# Patient Record
Sex: FEMALE | Race: WHITE
[De-identification: names, ages, dates, MRNs, and addresses within clinical notes are randomized per-mention and may not be internally consistent; named-entity substitution may affect disease eponyms.]

---

## 2017-06-26 ENCOUNTER — HOSPITAL ENCOUNTER (EMERGENCY)
Dept: HOSPITAL 10 - FTE | Age: 72
Discharge: HOME | End: 2017-06-26
Payer: MEDICARE

## 2017-06-26 VITALS
WEIGHT: 206.13 LBS | HEIGHT: 66 IN | HEIGHT: 66 IN | BODY MASS INDEX: 33.13 KG/M2 | WEIGHT: 206.13 LBS | BODY MASS INDEX: 33.13 KG/M2

## 2017-06-26 DIAGNOSIS — I10: ICD-10-CM

## 2017-06-26 DIAGNOSIS — R10.31: Primary | ICD-10-CM

## 2017-06-26 DIAGNOSIS — Z87.891: ICD-10-CM

## 2017-06-26 DIAGNOSIS — Z79.82: ICD-10-CM

## 2017-06-26 LAB
ADD SCAN DIFF: NO
ADD UMIC: NO
ALBUMIN SERPL-MCNC: 4.7 G/DL (ref 3.3–4.9)
ALBUMIN/GLOB SERPL: 1.62 {RATIO}
ALP SERPL-CCNC: 75 IU/L (ref 42–121)
ALT SERPL-CCNC: 41 IU/L (ref 13–69)
ANION GAP SERPL CALC-SCNC: 13 MMOL/L (ref 8–16)
AST SERPL-CCNC: 30 IU/L (ref 15–46)
BASOPHILS # BLD AUTO: 0 10^3/UL (ref 0–0.1)
BASOPHILS NFR BLD: 0.6 % (ref 0–2)
BILIRUB DIRECT SERPL-MCNC: 0 MG/DL (ref 0–0.2)
BILIRUB SERPL-MCNC: 0 MG/DL (ref 0.2–1.3)
BUN SERPL-MCNC: 15 MG/DL (ref 7–20)
CALCIUM SERPL-MCNC: 9.4 MG/DL (ref 8.4–10.2)
CHLORIDE SERPL-SCNC: 100 MMOL/L (ref 97–110)
CO2 SERPL-SCNC: 31 MMOL/L (ref 21–31)
COLOR UR: YELLOW
CREAT SERPL-MCNC: 0.99 MG/DL (ref 0.44–1)
EOSINOPHIL # BLD: 0.2 10^3/UL (ref 0–0.5)
EOSINOPHIL NFR BLD: 2.2 % (ref 0–7)
ERYTHROCYTE [DISTWIDTH] IN BLOOD BY AUTOMATED COUNT: 14.4 % (ref 11.5–14.5)
GLOBULIN SER-MCNC: 2.9 G/DL (ref 1.3–3.2)
GLUCOSE SERPL-MCNC: 90 MG/DL (ref 70–220)
GLUCOSE UR STRIP-MCNC: NEGATIVE MG/DL
HCT VFR BLD CALC: 41.9 % (ref 37–47)
HGB BLD-MCNC: 12.8 G/DL (ref 12–16)
KETONES UR STRIP.AUTO-MCNC: NEGATIVE MG/DL
LYMPHOCYTES # BLD AUTO: 1.9 10^3/UL (ref 0.8–2.9)
LYMPHOCYTES NFR BLD AUTO: 27.4 % (ref 15–51)
MCH RBC QN AUTO: 28 PG (ref 29–33)
MCHC RBC AUTO-ENTMCNC: 30.5 G/DL (ref 32–37)
MCV RBC AUTO: 91.7 FL (ref 82–101)
MONOCYTES # BLD: 0.6 10^3/UL (ref 0.3–0.9)
MONOCYTES NFR BLD: 9 % (ref 0–11)
NEUTROPHILS # BLD: 4.1 10^3/UL (ref 1.6–7.5)
NEUTROPHILS NFR BLD AUTO: 60.7 % (ref 39–77)
NITRITE UR QL STRIP.AUTO: NEGATIVE MG/DL
NRBC # BLD MANUAL: 0 10^3/UL (ref 0–0)
NRBC BLD QL: 0 /100WBC (ref 0–0)
PLATELET # BLD: 297 10^3/UL (ref 140–415)
PMV BLD AUTO: 9.3 FL (ref 7.4–10.4)
POTASSIUM SERPL-SCNC: 4.6 MMOL/L (ref 3.5–5.1)
PROT SERPL-MCNC: 7.6 G/DL (ref 6.1–8.1)
RBC # BLD AUTO: 4.57 10^6/UL (ref 4.2–5.4)
RBC # UR AUTO: NEGATIVE MG/DL
SODIUM SERPL-SCNC: 139 MMOL/L (ref 135–144)
SQUAMOUS #/AREA URNS HPF: (no result) /HPF
UR ASCORBIC ACID: NEGATIVE MG/DL
UR BILIRUBIN (DIP): NEGATIVE MG/DL
UR CLARITY: (no result)
UR PH (DIP): 6 (ref 5–9)
UR RBC: 2 /HPF (ref 0–5)
UR SPECIFIC GRAVITY (DIP): 1.02 (ref 1–1.03)
UR TOTAL PROTEIN (DIP): NEGATIVE MG/DL
UROBILINOGEN UR STRIP-ACNC: NEGATIVE MG/DL
WBC # BLD AUTO: 6.8 10^3/UL (ref 4.8–10.8)
WBC # UR STRIP: NEGATIVE LEU/UL

## 2017-06-26 PROCEDURE — 96375 TX/PRO/DX INJ NEW DRUG ADDON: CPT

## 2017-06-26 PROCEDURE — 96374 THER/PROPH/DIAG INJ IV PUSH: CPT

## 2017-06-26 PROCEDURE — 80053 COMPREHEN METABOLIC PANEL: CPT

## 2017-06-26 PROCEDURE — 83690 ASSAY OF LIPASE: CPT

## 2017-06-26 PROCEDURE — 81003 URINALYSIS AUTO W/O SCOPE: CPT

## 2017-06-26 PROCEDURE — 85025 COMPLETE CBC W/AUTO DIFF WBC: CPT

## 2017-06-26 PROCEDURE — 74176 CT ABD & PELVIS W/O CONTRAST: CPT

## 2017-06-26 PROCEDURE — 36415 COLL VENOUS BLD VENIPUNCTURE: CPT

## 2017-06-26 PROCEDURE — 81001 URINALYSIS AUTO W/SCOPE: CPT

## 2017-06-26 NOTE — ERD
ER Documentation


Chief Complaint


Date/Time


DATE: 6/26/17 


TIME: 15:53


Chief Complaint


Complains of constipation since Thursday





HPI


This is a 71-year-old female complains of constipation for the past 4 days.  

The patient states she has been having some right lower quadrant pain daily for 

2 months.  She states the pain is sharp and crampy at times and she feels like 

she might have a kidney stone.  The patient has had her appendix removed 

already.  Patient states the pain is not worse with eating or urinating.  No 

hematuria no vomiting diarrhea.  Denies any fever.  Has no anorexia or weight 

loss.





ROS


All systems reviewed and are negative except as per history of present illness.





Medications


Home Meds


Active Scripts


Tramadol HCl (Tramadol HCl) 50 Mg Tablet, 50 MG PO Q8H Y for PAIN, #20 TAB


   Prov:BERTRAM BOWSER DO         6/26/17


Polyethylene Glycol* (Miralax*) 17 Gm Powd.pack, 17 GM PO DAILY, #7


   Prov:BERTRAM BOWSER DO         6/26/17


Reported Medications


Lorazepam* (Lorazepam*) 1 Mg Tablet, 1 MG PO BID Y for ANXIETY, #30 TAB


   9/30/16


Buprenorphine Hcl (BUPRENORPHINE HCL) 8 Mg Tab.subl, 8 MG SL TID Y for ANXIETY, 

TAB.SL


   9/30/16


Trazodone Hcl* (Trazodone Hcl*) 300 Mg Tablet, 300 MG PO QHS, #30 TAB


   9/30/16


Aspirin* (Aspirin*) 325 Mg Tablet, 81 MG PO DAILY, TAB


   9/30/16


Lisinopril* (Lisinopril*) 5 Mg Tablet, 5 MG PO DAILY, #30 TAB


   9/30/16


Venlafaxine Hcl* (Venlafaxine Hcl ER*) 225 Mg Tab.er.24, 375 MG PO DAILY Y for 

ANXIETY, TAB.SA


   9/30/16


Oxybutynin Chloride* (Oxybutynin Chloride*) 5 Mg/5 Ml Syrup, 5 MG PO QHS Y for 

ANXIETY, ML


   9/30/16





Allergies


Allergies:  


Coded Allergies:  


     No Known Allergy (Unverified , 9/30/16)





PMhx/Soc


History of Surgery:  Yes (decompression laminectomy L4-5, decompression L4-5 

nerve root)


Anesthesia Reaction:  No


Hx Neurological Disorder:  No


Hx Respiratory Disorders:  Yes


Hx Cardiac Disorders:  No


Hx Psychiatric Problems:  Yes (ON ANTI-DEPRESSANT)


Hx Miscellaneous Medical Probl:  Yes (radiculopathy, fibromyalgia, HTN, 

hyperlipidemia)


Hx Alcohol Use:  No


Hx Substance Use:  No


Hx Tobacco Use:  Yes


Smoking Status:  Never smoker





FmHx


Family History:  No coronary disease





Physical Exam


Vitals





Vital Signs








  Date Time  Temp Pulse Resp B/P Pulse Ox O2 Delivery O2 Flow Rate FiO2


 


6/26/17 12:48 99.0 76 20 134/63 93   








Physical Exam


Const:      Well-developed, well-nourished


 Head:        Atraumatic, normocephalic


 Eyes:       Normal Conjunctiva, PERRLA, EOMI, normal sclera, no nystagmus


 ENT:         Normal External Ears, Nose and Mouth, moist mucus membranes.


 Neck:        Full range of motion.  No meningismus, no lymphadenopathy.


 Resp:         Clear to auscultation bilaterally, no wheezing, rhonchi, rales


 Cardio:       Regular rate and rhythm, no murmurs, S1 S2 present


 Abd:         Soft, mild to moderate right lower quadrant tenderness, non 

distended. Normal bowel sounds, no guarding or rebound, no pulsitile abdominal 

masses or bruits


 Skin:         No petechiae or rashes, no ecchymosis , no maculopapular rash


 Back:        No midline or flank tenderness


 Ext:          No cyanosis, or edema, FROM x 4, normal inspection, 

neurovascularly intact x 4


 Neur:        Awake and alert, STR 5/5 x 4, sensation intact x 4, no focal 

findings, cerebellum intact


 Psych:        Normal Mood and Affect


Result Diagram:  


6/26/17 1302                                                                   

             6/26/17 1302





Results 24 hrs





 Laboratory Tests








Test


  6/26/17


13:02


 


White Blood Count 6.810^3/ul 


 


Red Blood Count 4.5710^6/ul 


 


Hemoglobin 12.8g/dl 


 


Hematocrit 41.9% 


 


Mean Corpuscular Volume 91.7fl 


 


Mean Corpuscular Hemoglobin 28.0pg 


 


Mean Corpuscular Hemoglobin


Concent 30.5g/dl 


 


 


Red Cell Distribution Width 14.4% 


 


Platelet Count 64131^3/UL 


 


Mean Platelet Volume 9.3fl 


 


Neutrophils % 60.7% 


 


Lymphocytes % 27.4% 


 


Monocytes % 9.0% 


 


Eosinophils % 2.2% 


 


Basophils % 0.6% 


 


Nucleated Red Blood Cells % 0.0/100WBC 


 


Neutrophils # 4.110^3/ul 


 


Lymphocytes # 1.910^3/ul 


 


Monocytes # 0.610^3/ul 


 


Eosinophils # 0.210^3/ul 


 


Basophils # 0.010^3/ul 


 


Nucleated Red Blood Cells # 0.010^3/ul 


 


Urine Color YELLOW 


 


Urine Clarity


  SLIGHTLY


CLOUDY


 


Urine pH 6.0 


 


Urine Specific Gravity 1.019 


 


Urine Ketones NEGATIVEmg/dL 


 


Urine Nitrite NEGATIVEmg/dL 


 


Urine Bilirubin NEGATIVEmg/dL 


 


Urine Urobilinogen NEGATIVEmg/dL 


 


Urine Leukocyte Esterase NEGATIVELeu/ul 


 


Urine Microscopic RBC 2/HPF 


 


Urine Microscopic WBC 2/HPF 


 


Urine Squamous Epithelial


Cells FEW/HPF 


 


 


Urine Hemoglobin NEGATIVEmg/dL 


 


Urine Glucose NEGATIVEmg/dL 


 


Urine Total Protein NEGATIVEmg/dl 


 


Sodium Level 139mmol/L 


 


Potassium Level 4.6mmol/L 


 


Chloride Level 100mmol/L 


 


Carbon Dioxide Level 31mmol/L 


 


Anion Gap 13 


 


Blood Urea Nitrogen 15mg/dl 


 


Creatinine 0.99mg/dl 


 


Glucose Level 90mg/dl 


 


Calcium Level 9.4mg/dl 


 


Total Bilirubin 0.0mg/dl 


 


Direct Bilirubin 0.00mg/dl 


 


Indirect Bilirubin 0.0mg/dl 


 


Aspartate Amino Transf


(AST/SGOT) 30IU/L 


 


 


Alanine Aminotransferase


(ALT/SGPT) 41IU/L 


 


 


Alkaline Phosphatase 75IU/L 


 


Total Protein 7.6g/dl 


 


Albumin 4.7g/dl 


 


Globulin 2.90g/dl 


 


Albumin/Globulin Ratio 1.62 


 


Lipase 38U/L 








 Current Medications








 Medications


  (Trade)  Dose


 Ordered  Sig/Mary


 Route


 PRN Reason  Start Time


 Stop Time Status Last Admin


Dose Admin


 


 Hydromorphone HCl


 1 mg  1 mg  ONCE  STAT


 IV


   6/26/17 13:38


 6/26/17 13:40 DC 6/26/17 13:47


 


 


 Ondansetron HCl/


 Dextrose


  (Zofran Inj/D5W)  54 ml @ 


 200 mls/hr  ONCE  STAT


 IV


   6/26/17 13:38


 6/26/17 13:54 DC  


 


 


 Ondansetron HCl


  (Zofran Inj)  4 mg  STK-MED ONCE


 .ROUTE


   6/26/17 13:46


 6/26/17 13:47 DC  


 


 


 Ondansetron HCl


  (Zofran Inj)  4 mg  ONCE  STAT


 IV


   6/26/17 13:50


 6/26/17 13:57 DC 6/26/17 13:55


 











Procedures/MDM


PROCEDURE:   CT Abdomen and Pelvis without contrast. 


 


CLINICAL INDICATION:   Right flank pain. 


 


TECHNIQUE:   Multiple contiguous axial CT images of the abdomen and pelvis were 

obtained without the administration of intravenous contrast.  Coronal and 

sagittal reconstructions were also performed. CTDIvol (mGy):  22.19;  Total 

Exam DLP (mGy-cm):  1271.92.


 


One or more of the following dose reduction techniques were utilized:


 


-  Automated exposure control.


-  Adjustment of the mA and/or kV according to patient size.


-  Use of iterative reconstruction technique.


 


COMPARISON:   None. 


 


FINDINGS:


 


Limited imaging of the lower thorax is unremarkable.


 


The liver and spleen are homogeneous in density. The liver is diffusely low in 

attenuation compatible with fatty infiltration. The liver measures 

approximately 15 HU in density. The gallbladder, pancreas and adrenal glands 

are unremarkable.


 


The kidneys are symmetric in size. There are no nephroureteral stones.  There 

is no hydronephrosis or abnormal perinephric inflammation.  There is an 11 mm 

homogeneous partially exophytic hyperdense structure of the interpolar region 

of the left kidney which is favorable for a hyperdense cyst.


 


The abdominal aorta is normal in caliber.  Atherosclerotic calcification is 

present. There is no periaortic / retroperitoneal lymphadenopathy.


 


The stomach and small and large intestines are unremarkable.  The appendix is 

not visualized.  There are no focal inflammatory changes of the mesentery.  

There is no mesenteric lymphadenopathy.  There is no ascites. There is a tiny 

fat containing umbilical hernia.


 


The bladder is collapsed.  The uterus is absent.  The adnexa are unremarkable. 

There is no free pelvic fluid.  There is no pelvic sidewall or inguinal 

lymphadenopathy.


 


Degenerative changes of the lumbar spine are present.  Surgical changes 

compatible with L4 laminectomy are identified.  Body wall soft tissues are 

unremarkable.


 


 


IMPRESSION:


 


No evidence of abdominopelvic mass, lymphadenopathy or acute inflammatory 

pathology.


 


No evidence of nephroureterolithiasis, urinary tract obstruction or urinary 

tract inflammation.


 


Fatty infiltration of the liver.


 


Tiny hyperdense structure of the left kidney, favorable for a hyperdense cyst.  

Recommend short interval follow up with dedicated renal ultrasound to confirm 

cystic nature.


 


  


RPTAT:  HLST


_____________________________________________ 


.Liliana Munoz MD, MD           Date    Time 


Electronically viewed and signed by .Liliana Munoz MD, MD on 06/26/2017 15:13 


 


D:  06/26/2017 15:13  T:  06/26/2017 15:13


.T/





CC: BERTRAM BOWSER DO




















No pathology seen on CT scan of any significance requiring hospitalization or 

surgical intervention at this time.  Will treat for constipation and see if it 

alleviates her symptoms.  The patient was advised to get a colonoscopy as well 

as soon as possible.





Departure


Diagnosis:  


 Primary Impression:  


 Right lower quadrant pain


 Additional Impression:  


 Constipation


 Constipation type:  unspecified constipation type  Qualified Code:  K59.00 - 

Constipation, unspecified constipation type


Condition:  Stable


Patient Instructions:  Constipation (Adult), Abdominal Pain, Unkown Cause, (Male

)











BERTRAM BOWSER DO Jun 26, 2017 15:56

## 2017-06-26 NOTE — RADRPT
PROCEDURE:   CT Abdomen and Pelvis without contrast. 

 

CLINICAL INDICATION:   Right flank pain. 

 

TECHNIQUE:   Multiple contiguous axial CT images of the abdomen and pelvis were obtained without the
 administration of intravenous contrast.  Coronal and sagittal reconstructions were also performed. 
CTDIvol (mGy):  22.19;  Total Exam DLP (mGy-cm):  1271.92.

 

One or more of the following dose reduction techniques were utilized:

 

-  Automated exposure control.

-  Adjustment of the mA and/or kV according to patient size.

-  Use of iterative reconstruction technique.

 

COMPARISON:   None. 

 

FINDINGS:

 

Limited imaging of the lower thorax is unremarkable.

 

The liver and spleen are homogeneous in density. The liver is diffusely low in attenuation compatibl
e with fatty infiltration. The liver measures approximately 15 HU in density. The gallbladder, pancr
eas and adrenal glands are unremarkable.

 

The kidneys are symmetric in size. There are no nephroureteral stones.  There is no hydronephrosis o
r abnormal perinephric inflammation.  There is an 11 mm homogeneous partially exophytic hyperdense s
tructure of the interpolar region of the left kidney which is favorable for a hyperdense cyst.

 

The abdominal aorta is normal in caliber.  Atherosclerotic calcification is present. There is no per
iaortic / retroperitoneal lymphadenopathy.

 

The stomach and small and large intestines are unremarkable.  The appendix is not visualized.  There
 are no focal inflammatory changes of the mesentery.  There is no mesenteric lymphadenopathy.  There
 is no ascites. There is a tiny fat containing umbilical hernia.

 

The bladder is collapsed.  The uterus is absent.  The adnexa are unremarkable. There is no free pelv
ic fluid.  There is no pelvic sidewall or inguinal lymphadenopathy.

 

Degenerative changes of the lumbar spine are present.  Surgical changes compatible with L4 laminecto
my are identified.  Body wall soft tissues are unremarkable.

 

 

IMPRESSION:

 

No evidence of abdominopelvic mass, lymphadenopathy or acute inflammatory pathology.

 

No evidence of nephroureterolithiasis, urinary tract obstruction or urinary tract inflammation.

 

Fatty infiltration of the liver.

 

Tiny hyperdense structure of the left kidney, favorable for a hyperdense cyst.  Recommend short inte
rval follow up with dedicated renal ultrasound to confirm cystic nature.

 

  

RPTAT:  HLST

_____________________________________________ 

.Liliana Munoz MD, MD           Date    Time 

Electronically viewed and signed by .Liliana Munoz MD, MD on 06/26/2017 15:13 

 

D:  06/26/2017 15:13  T:  06/26/2017 15:13

.T/

## 2018-05-07 ENCOUNTER — HOSPITAL ENCOUNTER (EMERGENCY)
Dept: HOSPITAL 54 - ER | Age: 73
LOS: 1 days | Discharge: HOME | End: 2018-05-08
Payer: MEDICARE

## 2018-05-07 VITALS — SYSTOLIC BLOOD PRESSURE: 134 MMHG | DIASTOLIC BLOOD PRESSURE: 78 MMHG

## 2018-05-07 VITALS — BODY MASS INDEX: 36.7 KG/M2 | HEIGHT: 64 IN | WEIGHT: 215 LBS

## 2018-05-07 DIAGNOSIS — Z79.82: ICD-10-CM
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
## 2018-05-07 DIAGNOSIS — Z90.89: ICD-10-CM

## 2018-05-07 DIAGNOSIS — F32.9: ICD-10-CM
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
## 2018-05-07 DIAGNOSIS — F41.9: ICD-10-CM
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
## 2018-05-07 DIAGNOSIS — I10: ICD-10-CM

## 2018-05-07 DIAGNOSIS — Z90.710: ICD-10-CM

## 2018-05-07 DIAGNOSIS — J44.9: Primary | ICD-10-CM

## 2018-05-07 DIAGNOSIS — F17.290: ICD-10-CM

## 2018-05-07 DIAGNOSIS — F03.90: ICD-10-CM

## 2018-05-07 LAB
ALBUMIN SERPL BCP-MCNC: 2.5 G/DL (ref 3.4–5)
ALP SERPL-CCNC: 69 U/L (ref 46–116)
ALT SERPL W P-5'-P-CCNC: 25 U/L (ref 12–78)
APTT PPP: 24 SEC (ref 23–34)
AST SERPL W P-5'-P-CCNC: 20 U/L (ref 15–37)
BASOPHILS # BLD AUTO: 0 /CMM (ref 0–0.2)
BASOPHILS NFR BLD AUTO: 0.6 % (ref 0–2)
BILIRUB DIRECT SERPL-MCNC: 0 MG/DL (ref 0–0.2)
BILIRUB SERPL-MCNC: 0.1 MG/DL (ref 0.2–1)
BUN SERPL-MCNC: 23 MG/DL (ref 7–18)
CALCIUM SERPL-MCNC: 8.6 MG/DL (ref 8.5–10.1)
CHLORIDE SERPL-SCNC: 105 MMOL/L (ref 98–107)
CO2 SERPL-SCNC: 30 MMOL/L (ref 21–32)
CREAT SERPL-MCNC: 1.3 MG/DL (ref 0.6–1.3)
EOSINOPHIL NFR BLD AUTO: 4.1 % (ref 0–6)
GLUCOSE SERPL-MCNC: 135 MG/DL (ref 74–106)
HCT VFR BLD AUTO: 35 % (ref 33–45)
HGB BLD-MCNC: 11.4 G/DL (ref 11.5–14.8)
INR PPP: 0.9 (ref 0.87–1.13)
LYMPHOCYTES NFR BLD AUTO: 2.5 /CMM (ref 0.8–4.8)
LYMPHOCYTES NFR BLD AUTO: 33.9 % (ref 20–44)
MCHC RBC AUTO-ENTMCNC: 33 G/DL (ref 31–36)
MCV RBC AUTO: 86 FL (ref 82–100)
MONOCYTES NFR BLD AUTO: 0.7 /CMM (ref 0.1–1.3)
MONOCYTES NFR BLD AUTO: 9.6 % (ref 2–12)
NEUTROPHILS # BLD AUTO: 3.8 /CMM (ref 1.8–8.9)
NEUTROPHILS NFR BLD AUTO: 51.8 % (ref 43–81)
NT-PROBNP SERPL-MCNC: 399 PG/ML (ref 0–125)
PLATELET # BLD AUTO: 266 /CMM (ref 150–450)
POTASSIUM SERPL-SCNC: 4 MMOL/L (ref 3.5–5.1)
PROT SERPL-MCNC: 6.6 G/DL (ref 6.4–8.2)
RBC # BLD AUTO: 4.09 MIL/UL (ref 4–5.2)
RDW COEFFICIENT OF VARIATION: 15.3 (ref 11.5–15)
SODIUM SERPL-SCNC: 140 MMOL/L (ref 136–145)
TROPONIN I SERPL-MCNC: < 0.017 NG/ML (ref 0–0.06)
WBC NRBC COR # BLD AUTO: 7.2 K/UL (ref 4.3–11)

## 2018-05-07 PROCEDURE — A4606 OXYGEN PROBE USED W OXIMETER: HCPCS

## 2018-05-07 PROCEDURE — Z7610: HCPCS

## 2018-05-07 NOTE — NUR
IV removed. Catheter intact and site benign. Pressure and 4x4 applied to site. 
No bleeding noted.  Patient discharged to home in stable condition. Written and 
verbal after care instructions given. Patient verbalizes understanding of 
instruction. ambulatory with a steady gait. pt with daughter.

## 2018-05-07 NOTE — NUR
"TRIPPED AND FELL WHILE IN THE GARDEN AND EVERYTHING HURTS"; DENIES HEAD 
INJURY. NAD NOTED, VSS, RESP EVEN AND UNLABORED, PT WAS PUT ON MONITOR, WAITING 
FOR MD CARSON.

## 2018-05-08 ENCOUNTER — HOSPITAL ENCOUNTER (INPATIENT)
Dept: HOSPITAL 54 - ER | Age: 73
LOS: 2 days | Discharge: HOME | DRG: 191 | End: 2018-05-10
Attending: NURSE PRACTITIONER | Admitting: NURSE PRACTITIONER
Payer: MEDICARE

## 2018-05-08 VITALS — SYSTOLIC BLOOD PRESSURE: 139 MMHG | DIASTOLIC BLOOD PRESSURE: 65 MMHG

## 2018-05-08 VITALS — BODY MASS INDEX: 28.34 KG/M2 | HEIGHT: 64 IN | WEIGHT: 166 LBS

## 2018-05-08 VITALS — SYSTOLIC BLOOD PRESSURE: 148 MMHG | DIASTOLIC BLOOD PRESSURE: 61 MMHG

## 2018-05-08 VITALS — DIASTOLIC BLOOD PRESSURE: 60 MMHG | SYSTOLIC BLOOD PRESSURE: 148 MMHG

## 2018-05-08 DIAGNOSIS — F32.9: ICD-10-CM

## 2018-05-08 DIAGNOSIS — I50.32: ICD-10-CM

## 2018-05-08 DIAGNOSIS — F17.210: ICD-10-CM

## 2018-05-08 DIAGNOSIS — M79.7: ICD-10-CM

## 2018-05-08 DIAGNOSIS — I34.0: ICD-10-CM

## 2018-05-08 DIAGNOSIS — E88.09: ICD-10-CM

## 2018-05-08 DIAGNOSIS — F03.90: ICD-10-CM

## 2018-05-08 DIAGNOSIS — E11.9: ICD-10-CM

## 2018-05-08 DIAGNOSIS — N28.1: ICD-10-CM

## 2018-05-08 DIAGNOSIS — M94.0: ICD-10-CM

## 2018-05-08 DIAGNOSIS — E78.5: ICD-10-CM

## 2018-05-08 DIAGNOSIS — E66.01: ICD-10-CM

## 2018-05-08 DIAGNOSIS — J44.1: Primary | ICD-10-CM

## 2018-05-08 DIAGNOSIS — K59.00: ICD-10-CM

## 2018-05-08 DIAGNOSIS — I11.0: ICD-10-CM

## 2018-05-08 DIAGNOSIS — Z71.6: ICD-10-CM

## 2018-05-08 DIAGNOSIS — F41.9: ICD-10-CM

## 2018-05-08 DIAGNOSIS — E78.1: ICD-10-CM

## 2018-05-08 DIAGNOSIS — F41.1: ICD-10-CM

## 2018-05-08 DIAGNOSIS — G47.00: ICD-10-CM

## 2018-05-08 LAB
ALBUMIN SERPL BCP-MCNC: 2.6 G/DL (ref 3.4–5)
ALP SERPL-CCNC: 70 U/L (ref 46–116)
ALT SERPL W P-5'-P-CCNC: 32 U/L (ref 12–78)
APTT PPP: 22 SEC (ref 23–34)
AST SERPL W P-5'-P-CCNC: 25 U/L (ref 15–37)
BASOPHILS # BLD AUTO: 0.1 /CMM (ref 0–0.2)
BASOPHILS NFR BLD AUTO: 0.8 % (ref 0–2)
BILIRUB DIRECT SERPL-MCNC: 0 MG/DL (ref 0–0.2)
BILIRUB SERPL-MCNC: 0.2 MG/DL (ref 0.2–1)
BUN SERPL-MCNC: 27 MG/DL (ref 7–18)
CALCIUM SERPL-MCNC: 8.9 MG/DL (ref 8.5–10.1)
CHLORIDE SERPL-SCNC: 103 MMOL/L (ref 98–107)
CO2 SERPL-SCNC: 28 MMOL/L (ref 21–32)
CREAT SERPL-MCNC: 1.3 MG/DL (ref 0.6–1.3)
EOSINOPHIL NFR BLD AUTO: 0.1 % (ref 0–6)
GLUCOSE SERPL-MCNC: 157 MG/DL (ref 74–106)
HCT VFR BLD AUTO: 39 % (ref 33–45)
HGB BLD-MCNC: 12.8 G/DL (ref 11.5–14.8)
INR PPP: 0.85 (ref 0.85–1.15)
LIPASE SERPL-CCNC: 148 U/L (ref 73–393)
LYMPHOCYTES NFR BLD AUTO: 1.2 /CMM (ref 0.8–4.8)
LYMPHOCYTES NFR BLD AUTO: 12 % (ref 20–44)
MCHC RBC AUTO-ENTMCNC: 33 G/DL (ref 31–36)
MCV RBC AUTO: 85 FL (ref 82–100)
MONOCYTES NFR BLD AUTO: 0.1 /CMM (ref 0.1–1.3)
MONOCYTES NFR BLD AUTO: 1.5 % (ref 2–12)
NEUTROPHILS # BLD AUTO: 8.4 /CMM (ref 1.8–8.9)
NEUTROPHILS NFR BLD AUTO: 85.6 % (ref 43–81)
NT-PROBNP SERPL-MCNC: 472 PG/ML (ref 0–125)
PLATELET # BLD AUTO: 319 /CMM (ref 150–450)
POTASSIUM SERPL-SCNC: 4.7 MMOL/L (ref 3.5–5.1)
PROT SERPL-MCNC: 7.5 G/DL (ref 6.4–8.2)
RBC # BLD AUTO: 4.52 MIL/UL (ref 4–5.2)
RDW COEFFICIENT OF VARIATION: 14.9 (ref 11.5–15)
SODIUM SERPL-SCNC: 138 MMOL/L (ref 136–145)
TROPONIN I SERPL-MCNC: < 0.017 NG/ML (ref 0–0.06)
WBC NRBC COR # BLD AUTO: 9.8 K/UL (ref 4.3–11)

## 2018-05-08 PROCEDURE — A4606 OXYGEN PROBE USED W OXIMETER: HCPCS

## 2018-05-08 PROCEDURE — A6253 ABSORPT DRG > 48 SQ IN W/O B: HCPCS

## 2018-05-08 PROCEDURE — Z7610: HCPCS

## 2018-05-08 PROCEDURE — A9502 TC99M TETROFOSMIN: HCPCS

## 2018-05-08 RX ADMIN — PANTOPRAZOLE SODIUM SCH MG: 40 TABLET, DELAYED RELEASE ORAL at 13:50

## 2018-05-08 RX ADMIN — TRAZODONE HYDROCHLORIDE SCH MG: 50 TABLET ORAL at 21:16

## 2018-05-08 RX ADMIN — ENOXAPARIN SODIUM SCH MG: 40 INJECTION SUBCUTANEOUS at 13:54

## 2018-05-08 RX ADMIN — GABAPENTIN SCH MG: 300 CAPSULE ORAL at 21:16

## 2018-05-09 VITALS — SYSTOLIC BLOOD PRESSURE: 151 MMHG | DIASTOLIC BLOOD PRESSURE: 69 MMHG

## 2018-05-09 VITALS — DIASTOLIC BLOOD PRESSURE: 77 MMHG | SYSTOLIC BLOOD PRESSURE: 164 MMHG

## 2018-05-09 VITALS — DIASTOLIC BLOOD PRESSURE: 84 MMHG | SYSTOLIC BLOOD PRESSURE: 164 MMHG

## 2018-05-09 VITALS — SYSTOLIC BLOOD PRESSURE: 171 MMHG | DIASTOLIC BLOOD PRESSURE: 90 MMHG

## 2018-05-09 VITALS — SYSTOLIC BLOOD PRESSURE: 126 MMHG | DIASTOLIC BLOOD PRESSURE: 53 MMHG

## 2018-05-09 VITALS — SYSTOLIC BLOOD PRESSURE: 164 MMHG | DIASTOLIC BLOOD PRESSURE: 84 MMHG

## 2018-05-09 VITALS — SYSTOLIC BLOOD PRESSURE: 143 MMHG | DIASTOLIC BLOOD PRESSURE: 92 MMHG

## 2018-05-09 LAB
APPEARANCE UR: CLEAR
BASOPHILS # BLD AUTO: 0 /CMM (ref 0–0.2)
BASOPHILS NFR BLD AUTO: 0.3 % (ref 0–2)
BILIRUB UR QL STRIP: NEGATIVE
BUN SERPL-MCNC: 28 MG/DL (ref 7–18)
CALCIUM SERPL-MCNC: 8.4 MG/DL (ref 8.5–10.1)
CHLORIDE SERPL-SCNC: 106 MMOL/L (ref 98–107)
CHOLEST SERPL-MCNC: 236 MG/DL (ref ?–200)
CO2 SERPL-SCNC: 32 MMOL/L (ref 21–32)
CREAT SERPL-MCNC: 1.2 MG/DL (ref 0.6–1.3)
EOSINOPHIL NFR BLD AUTO: 1.1 % (ref 0–6)
GLUCOSE SERPL-MCNC: 121 MG/DL (ref 74–106)
GLUCOSE UR STRIP-MCNC: NEGATIVE MG/DL
HCT VFR BLD AUTO: 35 % (ref 33–45)
HDLC SERPL-MCNC: 69 MG/DL (ref 40–60)
HGB BLD-MCNC: 11.1 G/DL (ref 11.5–14.8)
HGB UR QL STRIP: (no result) ERY/UL
KETONES UR STRIP-MCNC: NEGATIVE MG/DL
LDLC SERPL DIRECT ASSAY-MCNC: 137 MG/DL (ref 0–99)
LEUKOCYTE ESTERASE UR QL STRIP: NEGATIVE
LYMPHOCYTES NFR BLD AUTO: 2.8 /CMM (ref 0.8–4.8)
LYMPHOCYTES NFR BLD AUTO: 24.6 % (ref 20–44)
MAGNESIUM SERPL-MCNC: 2.2 MG/DL (ref 1.8–2.4)
MCHC RBC AUTO-ENTMCNC: 32 G/DL (ref 31–36)
MCV RBC AUTO: 87 FL (ref 82–100)
MONOCYTES NFR BLD AUTO: 0.9 /CMM (ref 0.1–1.3)
MONOCYTES NFR BLD AUTO: 8.3 % (ref 2–12)
NEUTROPHILS # BLD AUTO: 7.4 /CMM (ref 1.8–8.9)
NEUTROPHILS NFR BLD AUTO: 65.7 % (ref 43–81)
NITRITE UR QL STRIP: NEGATIVE
NT-PROBNP SERPL-MCNC: 405 PG/ML (ref 0–125)
PH UR STRIP: 6.5 [PH] (ref 5–8)
PHOSPHATE SERPL-MCNC: 3.8 MG/DL (ref 2.5–4.9)
PLATELET # BLD AUTO: 270 /CMM (ref 150–450)
POTASSIUM SERPL-SCNC: 4.2 MMOL/L (ref 3.5–5.1)
PROT UR QL STRIP: (no result) MG/DL
RBC # BLD AUTO: 3.97 MIL/UL (ref 4–5.2)
RBC #/AREA URNS HPF: (no result) /HPF (ref 0–2)
RDW COEFFICIENT OF VARIATION: 15.9 (ref 11.5–15)
SODIUM SERPL-SCNC: 142 MMOL/L (ref 136–145)
TRIGL SERPL-MCNC: 235 MG/DL (ref 30–150)
UROBILINOGEN UR STRIP-MCNC: 0.2 EU/DL
WBC #/AREA URNS HPF: (no result) /HPF (ref 0–3)
WBC NRBC COR # BLD AUTO: 11.3 K/UL (ref 4.3–11)

## 2018-05-09 RX ADMIN — VENLAFAXINE HYDROCHLORIDE SCH MG: 150 CAPSULE, EXTENDED RELEASE ORAL at 09:18

## 2018-05-09 RX ADMIN — ENOXAPARIN SODIUM SCH MG: 40 INJECTION SUBCUTANEOUS at 09:21

## 2018-05-09 RX ADMIN — GABAPENTIN SCH MG: 300 CAPSULE ORAL at 21:06

## 2018-05-09 RX ADMIN — MAGNESIUM HYDROXIDE PRN ML: 400 SUSPENSION ORAL at 16:36

## 2018-05-09 RX ADMIN — Medication SCH MG: at 09:17

## 2018-05-09 RX ADMIN — LORAZEPAM PRN MG: 2 INJECTION INTRAMUSCULAR; INTRAVENOUS at 18:05

## 2018-05-09 RX ADMIN — GABAPENTIN SCH MG: 100 CAPSULE ORAL at 09:16

## 2018-05-09 RX ADMIN — PANTOPRAZOLE SODIUM SCH MG: 40 TABLET, DELAYED RELEASE ORAL at 09:17

## 2018-05-09 RX ADMIN — TRAZODONE HYDROCHLORIDE SCH MG: 50 TABLET ORAL at 21:06

## 2018-05-09 RX ADMIN — GABAPENTIN SCH MG: 100 CAPSULE ORAL at 12:22

## 2018-05-09 RX ADMIN — LISINOPRIL SCH MG: 5 TABLET ORAL at 09:17

## 2018-05-10 VITALS — DIASTOLIC BLOOD PRESSURE: 51 MMHG | SYSTOLIC BLOOD PRESSURE: 139 MMHG

## 2018-05-10 VITALS — SYSTOLIC BLOOD PRESSURE: 145 MMHG | DIASTOLIC BLOOD PRESSURE: 70 MMHG

## 2018-05-10 VITALS — DIASTOLIC BLOOD PRESSURE: 70 MMHG | SYSTOLIC BLOOD PRESSURE: 158 MMHG

## 2018-05-10 VITALS — DIASTOLIC BLOOD PRESSURE: 80 MMHG | SYSTOLIC BLOOD PRESSURE: 151 MMHG

## 2018-05-10 RX ADMIN — GABAPENTIN SCH MG: 100 CAPSULE ORAL at 12:29

## 2018-05-10 RX ADMIN — LORAZEPAM PRN MG: 2 INJECTION INTRAMUSCULAR; INTRAVENOUS at 17:09

## 2018-05-10 RX ADMIN — PANTOPRAZOLE SODIUM SCH MG: 40 TABLET, DELAYED RELEASE ORAL at 08:33

## 2018-05-10 RX ADMIN — ENOXAPARIN SODIUM SCH MG: 40 INJECTION SUBCUTANEOUS at 08:35

## 2018-05-10 RX ADMIN — MAGNESIUM HYDROXIDE PRN ML: 400 SUSPENSION ORAL at 06:12

## 2018-05-10 RX ADMIN — LISINOPRIL SCH MG: 5 TABLET ORAL at 08:36

## 2018-05-10 RX ADMIN — LORAZEPAM PRN MG: 2 INJECTION INTRAMUSCULAR; INTRAVENOUS at 11:11

## 2018-05-10 RX ADMIN — VENLAFAXINE HYDROCHLORIDE SCH MG: 150 CAPSULE, EXTENDED RELEASE ORAL at 08:33

## 2018-05-10 RX ADMIN — GABAPENTIN SCH MG: 100 CAPSULE ORAL at 08:33

## 2018-05-10 RX ADMIN — Medication SCH MG: at 08:33

## 2018-12-20 ENCOUNTER — HOSPITAL ENCOUNTER (INPATIENT)
Dept: HOSPITAL 54 - ER | Age: 73
LOS: 2 days | Discharge: HOME | DRG: 372 | End: 2018-12-22
Attending: NURSE PRACTITIONER | Admitting: NURSE PRACTITIONER
Payer: MEDICARE

## 2018-12-20 VITALS — DIASTOLIC BLOOD PRESSURE: 37 MMHG | SYSTOLIC BLOOD PRESSURE: 99 MMHG

## 2018-12-20 VITALS — BODY MASS INDEX: 38.93 KG/M2 | HEIGHT: 64 IN | WEIGHT: 228 LBS

## 2018-12-20 DIAGNOSIS — I25.10: ICD-10-CM

## 2018-12-20 DIAGNOSIS — Z87.442: ICD-10-CM

## 2018-12-20 DIAGNOSIS — I10: ICD-10-CM

## 2018-12-20 DIAGNOSIS — E78.5: ICD-10-CM

## 2018-12-20 DIAGNOSIS — K76.0: ICD-10-CM

## 2018-12-20 DIAGNOSIS — R59.0: ICD-10-CM

## 2018-12-20 DIAGNOSIS — A04.9: Primary | ICD-10-CM

## 2018-12-20 DIAGNOSIS — Z90.710: ICD-10-CM

## 2018-12-20 DIAGNOSIS — J44.9: ICD-10-CM

## 2018-12-20 DIAGNOSIS — M19.90: ICD-10-CM

## 2018-12-20 DIAGNOSIS — F03.90: ICD-10-CM

## 2018-12-20 DIAGNOSIS — K59.00: ICD-10-CM

## 2018-12-20 DIAGNOSIS — E66.2: ICD-10-CM

## 2018-12-20 DIAGNOSIS — F17.210: ICD-10-CM

## 2018-12-20 DIAGNOSIS — M79.7: ICD-10-CM

## 2018-12-20 DIAGNOSIS — F41.9: ICD-10-CM

## 2018-12-20 DIAGNOSIS — Z90.49: ICD-10-CM

## 2018-12-20 DIAGNOSIS — F11.20: ICD-10-CM

## 2018-12-20 DIAGNOSIS — E11.51: ICD-10-CM

## 2018-12-20 DIAGNOSIS — E87.3: ICD-10-CM

## 2018-12-20 DIAGNOSIS — F32.9: ICD-10-CM

## 2018-12-20 DIAGNOSIS — Z79.84: ICD-10-CM

## 2018-12-20 LAB
ALBUMIN SERPL BCP-MCNC: 3.4 G/DL (ref 3.4–5)
ALP SERPL-CCNC: 48 U/L (ref 46–116)
ALT SERPL W P-5'-P-CCNC: 41 U/L (ref 12–78)
AST SERPL W P-5'-P-CCNC: 24 U/L (ref 15–37)
BASOPHILS # BLD AUTO: 0.1 /CMM (ref 0–0.2)
BASOPHILS NFR BLD AUTO: 0.5 % (ref 0–2)
BILIRUB DIRECT SERPL-MCNC: 0.1 MG/DL (ref 0–0.2)
BILIRUB SERPL-MCNC: 0.3 MG/DL (ref 0.2–1)
BUN SERPL-MCNC: 15 MG/DL (ref 7–18)
CALCIUM SERPL-MCNC: 8.8 MG/DL (ref 8.5–10.1)
CHLORIDE SERPL-SCNC: 100 MMOL/L (ref 98–107)
CO2 SERPL-SCNC: 34 MMOL/L (ref 21–32)
CREAT SERPL-MCNC: 1 MG/DL (ref 0.6–1.3)
EOSINOPHIL NFR BLD AUTO: 1.1 % (ref 0–6)
GLUCOSE SERPL-MCNC: 113 MG/DL (ref 74–106)
HCT VFR BLD AUTO: 39 % (ref 33–45)
HGB BLD-MCNC: 12.4 G/DL (ref 11.5–14.8)
LIPASE SERPL-CCNC: 84 U/L (ref 73–393)
LYMPHOCYTES NFR BLD AUTO: 19.2 % (ref 20–44)
LYMPHOCYTES NFR BLD AUTO: 2.3 /CMM (ref 0.8–4.8)
MCHC RBC AUTO-ENTMCNC: 32 G/DL (ref 31–36)
MCV RBC AUTO: 96 FL (ref 82–100)
MONOCYTES NFR BLD AUTO: 1 /CMM (ref 0.1–1.3)
MONOCYTES NFR BLD AUTO: 8.7 % (ref 2–12)
NEUTROPHILS # BLD AUTO: 8.5 /CMM (ref 1.8–8.9)
NEUTROPHILS NFR BLD AUTO: 70.5 % (ref 43–81)
PH UR STRIP: 6.5 [PH] (ref 5–8)
PLATELET # BLD AUTO: 251 /CMM (ref 150–450)
POTASSIUM SERPL-SCNC: 4 MMOL/L (ref 3.5–5.1)
PROT SERPL-MCNC: 6.9 G/DL (ref 6.4–8.2)
RBC # BLD AUTO: 4.03 MIL/UL (ref 4–5.2)
SODIUM SERPL-SCNC: 137 MMOL/L (ref 136–145)
UROBILINOGEN UR STRIP-MCNC: 0.2 EU/DL
WBC NRBC COR # BLD AUTO: 12.1 K/UL (ref 4.3–11)

## 2018-12-20 PROCEDURE — G0378 HOSPITAL OBSERVATION PER HR: HCPCS

## 2018-12-20 PROCEDURE — Z7610: HCPCS

## 2018-12-20 PROCEDURE — A4606 OXYGEN PROBE USED W OXIMETER: HCPCS

## 2018-12-20 PROCEDURE — A6402 STERILE GAUZE <= 16 SQ IN: HCPCS

## 2018-12-20 RX ADMIN — Medication ONE MG: at 15:24

## 2018-12-20 RX ADMIN — HYDROMORPHONE HYDROCHLORIDE PRN MG: 1 INJECTION, SOLUTION INTRAMUSCULAR; INTRAVENOUS; SUBCUTANEOUS at 17:08

## 2018-12-20 RX ADMIN — HYDROMORPHONE HYDROCHLORIDE PRN MG: 1 INJECTION, SOLUTION INTRAMUSCULAR; INTRAVENOUS; SUBCUTANEOUS at 22:28

## 2018-12-20 RX ADMIN — LEVOFLOXACIN SCH MG: 500 TABLET, FILM COATED ORAL at 21:19

## 2018-12-20 RX ADMIN — ZOLPIDEM TARTRATE SCH MG: 5 TABLET, FILM COATED ORAL at 22:27

## 2018-12-20 RX ADMIN — Medication ONE MG: at 15:12

## 2018-12-20 RX ADMIN — METRONIDAZOLE SCH MG: 250 TABLET ORAL at 23:39

## 2018-12-20 RX ADMIN — ENOXAPARIN SODIUM SCH MG: 40 INJECTION SUBCUTANEOUS at 21:21

## 2018-12-20 RX ADMIN — GABAPENTIN SCH MG: 300 CAPSULE ORAL at 21:20

## 2018-12-20 RX ADMIN — SODIUM CHLORIDE PRN MLS/HR: 9 INJECTION, SOLUTION INTRAVENOUS at 20:25

## 2018-12-20 NOTE — NUR
BIB SELF, W C/O ABDOMINAL PAIN SINCE YESTERDAY EVENING, TO ER BED 9, HOOKED TO 
MONITOR, AWAITING MD CARSON

## 2018-12-20 NOTE — NUR
MS RN ADMISSION



73 years old female admitted to med surg. brought in via wheel chair from ER. Patient is A/O 
x4, ambulates independently. Skin intact, reported pain in her right lower abdomen, patient 
was given pain medication prior transferred to the unit. Orientation to room, unit, staff. 
Maintained safety, will cont to monitor.

## 2018-12-20 NOTE — NUR
AWAKE AND ALERT ORIENTATED X4 VERBALIZING HER NEEDS. TALKATIVE REGARDING HER HEALTH  CALL 
LIGHT REVIEVED WITH PATIENT

## 2018-12-20 NOTE — NUR
RECIEVED ALERT AND ORIENTATED.  MANY QUESTIONS AND DEMANDS.  SPEECH CLEAR. REVIEVED TONIGHTS 
PROGRAM.

## 2018-12-20 NOTE — NUR
MS RN CLOSING NOTES



Patient is awake, up sitting in the bed, having dinner with good appetite. No complaint of 
abdominal pain, awaiting medication to be verified by pharmacy. Maintained safety, endorsed 
to oncoming RN.

## 2018-12-21 VITALS — SYSTOLIC BLOOD PRESSURE: 102 MMHG | DIASTOLIC BLOOD PRESSURE: 52 MMHG

## 2018-12-21 VITALS — SYSTOLIC BLOOD PRESSURE: 110 MMHG | DIASTOLIC BLOOD PRESSURE: 51 MMHG

## 2018-12-21 VITALS — DIASTOLIC BLOOD PRESSURE: 63 MMHG | SYSTOLIC BLOOD PRESSURE: 108 MMHG

## 2018-12-21 VITALS — DIASTOLIC BLOOD PRESSURE: 63 MMHG | SYSTOLIC BLOOD PRESSURE: 129 MMHG

## 2018-12-21 LAB
ALBUMIN SERPL BCP-MCNC: 2.5 G/DL (ref 3.4–5)
ALP SERPL-CCNC: 43 U/L (ref 46–116)
ALT SERPL W P-5'-P-CCNC: 30 U/L (ref 12–78)
AST SERPL W P-5'-P-CCNC: 21 U/L (ref 15–37)
BASOPHILS # BLD AUTO: 0 /CMM (ref 0–0.2)
BASOPHILS NFR BLD AUTO: 0.4 % (ref 0–2)
BILIRUB SERPL-MCNC: 0.2 MG/DL (ref 0.2–1)
BUN SERPL-MCNC: 15 MG/DL (ref 7–18)
CALCIUM SERPL-MCNC: 8.3 MG/DL (ref 8.5–10.1)
CHLORIDE SERPL-SCNC: 106 MMOL/L (ref 98–107)
CHOLEST SERPL-MCNC: 157 MG/DL (ref ?–200)
CO2 SERPL-SCNC: 28 MMOL/L (ref 21–32)
CREAT SERPL-MCNC: 1.1 MG/DL (ref 0.6–1.3)
EOSINOPHIL NFR BLD AUTO: 2.3 % (ref 0–6)
GLUCOSE SERPL-MCNC: 139 MG/DL (ref 74–106)
HCT VFR BLD AUTO: 33 % (ref 33–45)
HDLC SERPL-MCNC: 51 MG/DL (ref 40–60)
HGB BLD-MCNC: 10.6 G/DL (ref 11.5–14.8)
LDLC SERPL DIRECT ASSAY-MCNC: 87 MG/DL (ref 0–99)
LYMPHOCYTES NFR BLD AUTO: 2.2 /CMM (ref 0.8–4.8)
LYMPHOCYTES NFR BLD AUTO: 31.1 % (ref 20–44)
MAGNESIUM SERPL-MCNC: 2.1 MG/DL (ref 1.8–2.4)
MCHC RBC AUTO-ENTMCNC: 32 G/DL (ref 31–36)
MCV RBC AUTO: 96 FL (ref 82–100)
MONOCYTES NFR BLD AUTO: 0.8 /CMM (ref 0.1–1.3)
MONOCYTES NFR BLD AUTO: 11.8 % (ref 2–12)
NEUTROPHILS # BLD AUTO: 3.9 /CMM (ref 1.8–8.9)
NEUTROPHILS NFR BLD AUTO: 54.4 % (ref 43–81)
PHOSPHATE SERPL-MCNC: 3.3 MG/DL (ref 2.5–4.9)
PLATELET # BLD AUTO: 178 /CMM (ref 150–450)
POTASSIUM SERPL-SCNC: 3.9 MMOL/L (ref 3.5–5.1)
PROT SERPL-MCNC: 5.6 G/DL (ref 6.4–8.2)
RBC # BLD AUTO: 3.4 MIL/UL (ref 4–5.2)
SODIUM SERPL-SCNC: 142 MMOL/L (ref 136–145)
TRIGL SERPL-MCNC: 183 MG/DL (ref 30–150)
TSH SERPL DL<=0.005 MIU/L-ACNC: 2.67 UIU/ML (ref 0.36–3.74)
WBC NRBC COR # BLD AUTO: 7.1 K/UL (ref 4.3–11)

## 2018-12-21 RX ADMIN — ZOLPIDEM TARTRATE SCH MG: 5 TABLET, FILM COATED ORAL at 22:21

## 2018-12-21 RX ADMIN — GABAPENTIN SCH MG: 300 CAPSULE ORAL at 22:21

## 2018-12-21 RX ADMIN — ENOXAPARIN SODIUM SCH MG: 40 INJECTION SUBCUTANEOUS at 20:23

## 2018-12-21 RX ADMIN — HYDROMORPHONE HYDROCHLORIDE PRN MG: 1 INJECTION, SOLUTION INTRAMUSCULAR; INTRAVENOUS; SUBCUTANEOUS at 00:02

## 2018-12-21 RX ADMIN — GABAPENTIN SCH MG: 300 CAPSULE ORAL at 09:24

## 2018-12-21 RX ADMIN — LORAZEPAM PRN MG: 1 TABLET ORAL at 22:49

## 2018-12-21 RX ADMIN — MAGNESIUM OXIDE TAB 400 MG (241.3 MG ELEMENTAL MG) SCH MG: 400 (241.3 MG) TAB at 17:03

## 2018-12-21 RX ADMIN — METRONIDAZOLE SCH MG: 250 TABLET ORAL at 18:09

## 2018-12-21 RX ADMIN — DULOXETINE HYDROCHLORIDE SCH MG: 30 CAPSULE, DELAYED RELEASE ORAL at 09:24

## 2018-12-21 RX ADMIN — LEVOFLOXACIN SCH MG: 500 TABLET, FILM COATED ORAL at 20:22

## 2018-12-21 RX ADMIN — GABAPENTIN SCH MG: 100 CAPSULE ORAL at 12:49

## 2018-12-21 RX ADMIN — EZETIMIBE SCH MG: 10 TABLET ORAL at 09:25

## 2018-12-21 RX ADMIN — MAGNESIUM OXIDE TAB 400 MG (241.3 MG ELEMENTAL MG) SCH MG: 400 (241.3 MG) TAB at 09:24

## 2018-12-21 RX ADMIN — HYDROMORPHONE HYDROCHLORIDE PRN MG: 1 INJECTION, SOLUTION INTRAMUSCULAR; INTRAVENOUS; SUBCUTANEOUS at 15:11

## 2018-12-21 RX ADMIN — POLYETHYLENE GLYCOL 3350, SODIUM SULFATE ANHYDROUS, SODIUM BICARBONATE, SODIUM CHLORIDE, POTASSIUM CHLORIDE ONE ML: 236; 22.74; 6.74; 5.86; 2.97 POWDER, FOR SOLUTION ORAL at 17:00

## 2018-12-21 RX ADMIN — SODIUM CHLORIDE PRN MLS/HR: 9 INJECTION, SOLUTION INTRAVENOUS at 15:19

## 2018-12-21 RX ADMIN — MORPHINE SULFATE SCH MG: 15 TABLET, EXTENDED RELEASE ORAL at 07:30

## 2018-12-21 RX ADMIN — POLYETHYLENE GLYCOL 3350, SODIUM SULFATE ANHYDROUS, SODIUM BICARBONATE, SODIUM CHLORIDE, POTASSIUM CHLORIDE ONE ML: 236; 22.74; 6.74; 5.86; 2.97 POWDER, FOR SOLUTION ORAL at 20:25

## 2018-12-21 RX ADMIN — METRONIDAZOLE SCH MG: 250 TABLET ORAL at 12:10

## 2018-12-21 RX ADMIN — METRONIDAZOLE SCH MG: 250 TABLET ORAL at 05:22

## 2018-12-21 RX ADMIN — HYDROMORPHONE HYDROCHLORIDE PRN MG: 1 INJECTION, SOLUTION INTRAMUSCULAR; INTRAVENOUS; SUBCUTANEOUS at 03:18

## 2018-12-21 NOTE — NUR
RN OPENING NOTES



RECEIVED PT. IN BED A&OX4. BREATHING UNLABORED ON ROOM AIR. NO S/S OF ACUTE DISTRESS. IV 
FLUIDS RUNNING AT 75 ML/HR. BED IS IN LOWEST, AND LOCKED POSITION, 2 SIDE RAILS UP, AND 
INSTRUCTED PT. TO USE CALL LIGHT FOR ASSISTANCE.

## 2018-12-21 NOTE — NUR
RN CLOSING NOTES



PT. IS IN BED A&OX4. BREATHING UNLABORED ON OXYGEN AT 2.5L/MIN VIA NASAL CANNULA. NO S/S OF 
ACUTE DISTRESS. IV FLUIDS RUNNING AT 75 ML/HR. BED IS IN LOWEST, AND LOCKED POSITION, 2 SIDE 
RAILS UP, AND INSTRUCTED PT. TO USE CALL LIGHT FOR ASSISTANCE. WILL ENDORSE REPORT TO NURSE.

## 2018-12-21 NOTE — NUR
PER CNA PT.'S SPO2 IS BETWEEN 83-85 %. REASSESSED PT.'S OXYGEN. PT. C/O THAT SHE HAD BEEN 
FEELING SOB SINCE 6 AM THIS MORNING. PT. WAS PLACED ON OXYGEN AT 3L/MIN VIA NASAL CANNULA. 
PT. IS TOLERATING OXYGEN WELL, AND SPO2 IS AT 92%.

## 2018-12-21 NOTE — NUR
ENDING NOTES: MS. TAYLOR  MEDICATED X2 WITH DILAUDID FOR RIGHT LOWER ABD PAIN, EFFECTIVE. 
AMBULATED TO THE BATHROOM STEADY ON HER LEGS, NO BM VOIDED ONLY.  MG CITRATE AND SENOKOT 
GIVEN EARLIER AS ORDERED.   SLEPT BETWEEN CARE NO N/V COMLAINTS.

## 2018-12-21 NOTE — NUR
MS RN NOTE:



RECEIVED PT SITTING AT EDGE OF BED ALERT AND ORIENTED X3. ABLE TO MAKE NEEDS KNOWN. NO 
APPARENT DISTRESS NOTED. DENIES PAIN AND DISCOMFORT AT THIS TIME. ON ROOM AIR, SATURATING 
WELL. IV ON RIGHT HAND #22 INTACT AND PATENT, IVF INFUSING WELL. PT IS AMBULATORY. CALL 
LIGHT PLACED WITHIN REACH. KEPT CLEAN, DRY AND COMFORTABLE. SAFETY AND FALL PRECAUTIONS 
OBSERVED AND MAINTAINED. WILL CONTINUE TO MONITOR PT.

## 2018-12-21 NOTE — NUR
MS RN NOTE:



PATIENT REFUSED TO FINISH THE GOLYTELY, STATED "I DONT WANT TO FINISH IT ANYMORE". EXPLAINED 
RISKS AND BENEFITS PT STILL REFUSED.

## 2018-12-22 VITALS — SYSTOLIC BLOOD PRESSURE: 131 MMHG | DIASTOLIC BLOOD PRESSURE: 58 MMHG

## 2018-12-22 VITALS — DIASTOLIC BLOOD PRESSURE: 74 MMHG | SYSTOLIC BLOOD PRESSURE: 148 MMHG

## 2018-12-22 LAB
BASOPHILS # BLD AUTO: 0 /CMM (ref 0–0.2)
BASOPHILS NFR BLD AUTO: 0.5 % (ref 0–2)
BUN SERPL-MCNC: 11 MG/DL (ref 7–18)
CALCIUM SERPL-MCNC: 8.9 MG/DL (ref 8.5–10.1)
CHLORIDE SERPL-SCNC: 105 MMOL/L (ref 98–107)
CO2 SERPL-SCNC: 28 MMOL/L (ref 21–32)
CREAT SERPL-MCNC: 1 MG/DL (ref 0.6–1.3)
EOSINOPHIL NFR BLD AUTO: 1.3 % (ref 0–6)
GLUCOSE SERPL-MCNC: 208 MG/DL (ref 74–106)
HCT VFR BLD AUTO: 36 % (ref 33–45)
HGB BLD-MCNC: 11.5 G/DL (ref 11.5–14.8)
LYMPHOCYTES NFR BLD AUTO: 1.3 /CMM (ref 0.8–4.8)
LYMPHOCYTES NFR BLD AUTO: 17.6 % (ref 20–44)
MAGNESIUM SERPL-MCNC: 1.9 MG/DL (ref 1.8–2.4)
MCHC RBC AUTO-ENTMCNC: 32 G/DL (ref 31–36)
MCV RBC AUTO: 95 FL (ref 82–100)
MONOCYTES NFR BLD AUTO: 0.5 /CMM (ref 0.1–1.3)
MONOCYTES NFR BLD AUTO: 6.7 % (ref 2–12)
NEUTROPHILS # BLD AUTO: 5.5 /CMM (ref 1.8–8.9)
NEUTROPHILS NFR BLD AUTO: 73.9 % (ref 43–81)
PHOSPHATE SERPL-MCNC: 2.9 MG/DL (ref 2.5–4.9)
PLATELET # BLD AUTO: 228 /CMM (ref 150–450)
POTASSIUM SERPL-SCNC: 4.1 MMOL/L (ref 3.5–5.1)
RBC # BLD AUTO: 3.8 MIL/UL (ref 4–5.2)
SODIUM SERPL-SCNC: 142 MMOL/L (ref 136–145)
WBC NRBC COR # BLD AUTO: 7.5 K/UL (ref 4.3–11)

## 2018-12-22 RX ADMIN — EZETIMIBE SCH MG: 10 TABLET ORAL at 08:15

## 2018-12-22 RX ADMIN — MAGNESIUM OXIDE TAB 400 MG (241.3 MG ELEMENTAL MG) SCH MG: 400 (241.3 MG) TAB at 08:15

## 2018-12-22 RX ADMIN — GABAPENTIN SCH MG: 100 CAPSULE ORAL at 13:01

## 2018-12-22 RX ADMIN — METRONIDAZOLE SCH MG: 250 TABLET ORAL at 05:24

## 2018-12-22 RX ADMIN — LORAZEPAM PRN MG: 1 TABLET ORAL at 04:53

## 2018-12-22 RX ADMIN — MORPHINE SULFATE SCH MG: 15 TABLET, EXTENDED RELEASE ORAL at 10:23

## 2018-12-22 RX ADMIN — LORAZEPAM PRN MG: 1 TABLET ORAL at 10:54

## 2018-12-22 RX ADMIN — METRONIDAZOLE SCH MG: 250 TABLET ORAL at 13:02

## 2018-12-22 RX ADMIN — METRONIDAZOLE SCH MG: 250 TABLET ORAL at 00:20

## 2018-12-22 RX ADMIN — HYDROMORPHONE HYDROCHLORIDE PRN MG: 1 INJECTION, SOLUTION INTRAMUSCULAR; INTRAVENOUS; SUBCUTANEOUS at 13:31

## 2018-12-22 RX ADMIN — DULOXETINE HYDROCHLORIDE SCH MG: 30 CAPSULE, DELAYED RELEASE ORAL at 08:15

## 2018-12-22 RX ADMIN — HYDROMORPHONE HYDROCHLORIDE PRN MG: 1 INJECTION, SOLUTION INTRAMUSCULAR; INTRAVENOUS; SUBCUTANEOUS at 06:40

## 2018-12-22 RX ADMIN — GABAPENTIN SCH MG: 300 CAPSULE ORAL at 08:19

## 2018-12-22 NOTE — NUR
MS RN OPENING NOTES

RECEIVED PT ON BED.ALERT/ORIENTED X4.WITH ROOM AIR,TOLERATING WELL.NO SOB AND ACUTE DISTRESS 
NOTED.CAM AMBULATE WELL WITH OUT ASSISTANCE.IV LINE IS ON RIGHT HAND G22,SITE IS CLEAN,DRY 
AND INTACT.SAFETY IS MAINTAINED AT ALL TIMES.BED IS IN LOW POSITION AND LOCKED.CALL LIGHT IS 
WITHIN REACH.WILL CONTINUE TO MONITOR THE PT CLOSELY.

## 2018-12-22 NOTE — NUR
MS RN DISCHARGE NOTES

PT IS DISCHARGED TO HOME WITH THE DAUGHTER VIA PRIVATE CAR.ALERT/ORIENTED X4.ON ROOM AIR 
WITH VITAL SIGNS ARE WNL.NO SOB AND ACUTE DISTRESS NOTED.ALL THE DISCHARGE MEDICATIONS AND 
PROCESS DISCUSSED TO THE PT AND DAUGHTER.THE MEDICATION PRESCRIPTION IS GIVEN TO THE 
PT.INVENTORY LIST SIGNED BY THE DAUGHTER.SKIN ASSESSMENT IS DONE AND PICTURE HAS TAKEN.IV 
LINE FROM RIGHT FA IS REMOVED.DRESSING IS APPLIED.NO BLEEDING NOTED.PT AMBULATED TO HOME 
WITH THE DAUGHTER.NO COMPLICATIONS NOTED.

## 2019-02-20 ENCOUNTER — HOSPITAL ENCOUNTER (OUTPATIENT)
Dept: HOSPITAL 91 - LAB | Age: 74
Discharge: HOME | End: 2019-02-20
Payer: MEDICARE

## 2019-02-20 ENCOUNTER — HOSPITAL ENCOUNTER (OUTPATIENT)
Dept: HOSPITAL 10 - LAB | Age: 74
Discharge: HOME | End: 2019-02-20
Attending: INTERNAL MEDICINE
Payer: MEDICARE

## 2019-02-20 DIAGNOSIS — K74.60: ICD-10-CM

## 2019-02-20 DIAGNOSIS — Z01.818: Primary | ICD-10-CM

## 2019-02-20 DIAGNOSIS — D01.5: ICD-10-CM

## 2019-02-20 DIAGNOSIS — K75.81: ICD-10-CM

## 2019-02-20 PROCEDURE — 71046 X-RAY EXAM CHEST 2 VIEWS: CPT

## 2019-02-26 ENCOUNTER — HOSPITAL ENCOUNTER (EMERGENCY)
Dept: HOSPITAL 54 - ER | Age: 74
Discharge: HOME | End: 2019-02-26
Payer: MEDICARE

## 2019-02-26 VITALS — DIASTOLIC BLOOD PRESSURE: 66 MMHG | SYSTOLIC BLOOD PRESSURE: 112 MMHG

## 2019-02-26 VITALS — BODY MASS INDEX: 35.82 KG/M2 | WEIGHT: 215 LBS | HEIGHT: 65 IN

## 2019-02-26 DIAGNOSIS — Z90.89: ICD-10-CM

## 2019-02-26 DIAGNOSIS — E11.9: ICD-10-CM

## 2019-02-26 DIAGNOSIS — Z90.710: ICD-10-CM

## 2019-02-26 DIAGNOSIS — M79.661: Primary | ICD-10-CM

## 2019-02-26 DIAGNOSIS — Z79.82: ICD-10-CM

## 2019-02-26 DIAGNOSIS — F32.9: ICD-10-CM

## 2019-02-26 DIAGNOSIS — Z79.899: ICD-10-CM

## 2019-02-26 DIAGNOSIS — I10: ICD-10-CM

## 2019-02-26 DIAGNOSIS — F17.200: ICD-10-CM

## 2019-02-26 DIAGNOSIS — F41.9: ICD-10-CM

## 2019-02-26 DIAGNOSIS — M79.7: ICD-10-CM

## 2019-02-26 DIAGNOSIS — Z79.84: ICD-10-CM

## 2019-02-26 DIAGNOSIS — M19.90: ICD-10-CM

## 2019-02-26 DIAGNOSIS — Z98.890: ICD-10-CM

## 2019-02-26 DIAGNOSIS — F03.90: ICD-10-CM

## 2019-02-26 DIAGNOSIS — I73.89: ICD-10-CM

## 2019-02-26 PROCEDURE — 93971 EXTREMITY STUDY: CPT

## 2019-02-26 PROCEDURE — 99284 EMERGENCY DEPT VISIT MOD MDM: CPT

## 2019-02-26 RX ADMIN — OXYCODONE HYDROCHLORIDE AND ACETAMINOPHEN ONE UDTAB: 5; 325 TABLET ORAL at 17:05

## 2019-02-26 NOTE — NUR
BIB DAUGHTER 73 YEAR OLD FEMALE C/O RLE PAIN SINCE 1330. WORST WHEN AMBULATING. 
ALERT AND ORIENTED X4, BREATHING EVEN AND UNLABORED WITH NO DISTRESS NOTED. 
SKIN INTACT AND WARM TO TOUCH. AWAITING TO BE SEEN BY MD

## 2019-03-29 ENCOUNTER — HOSPITAL ENCOUNTER (EMERGENCY)
Dept: HOSPITAL 54 - ER | Age: 74
Discharge: HOME | End: 2019-03-29
Payer: MEDICARE

## 2019-03-29 VITALS — WEIGHT: 224 LBS | HEIGHT: 64 IN | BODY MASS INDEX: 38.24 KG/M2

## 2019-03-29 VITALS — DIASTOLIC BLOOD PRESSURE: 58 MMHG | SYSTOLIC BLOOD PRESSURE: 122 MMHG

## 2019-03-29 DIAGNOSIS — G89.29: ICD-10-CM

## 2019-03-29 DIAGNOSIS — F32.9: ICD-10-CM

## 2019-03-29 DIAGNOSIS — Z79.82: ICD-10-CM

## 2019-03-29 DIAGNOSIS — E78.00: ICD-10-CM

## 2019-03-29 DIAGNOSIS — Z90.710: ICD-10-CM

## 2019-03-29 DIAGNOSIS — N17.9: ICD-10-CM

## 2019-03-29 DIAGNOSIS — I10: ICD-10-CM

## 2019-03-29 DIAGNOSIS — K62.5: Primary | ICD-10-CM

## 2019-03-29 DIAGNOSIS — E11.9: ICD-10-CM

## 2019-03-29 DIAGNOSIS — F03.90: ICD-10-CM

## 2019-03-29 DIAGNOSIS — F41.9: ICD-10-CM

## 2019-03-29 DIAGNOSIS — Z90.89: ICD-10-CM

## 2019-03-29 DIAGNOSIS — F17.200: ICD-10-CM

## 2019-03-29 DIAGNOSIS — M79.7: ICD-10-CM

## 2019-03-29 LAB
ALBUMIN SERPL BCP-MCNC: 3.9 G/DL (ref 3.4–5)
ALP SERPL-CCNC: 54 U/L (ref 46–116)
ALT SERPL W P-5'-P-CCNC: 27 U/L (ref 12–78)
AST SERPL W P-5'-P-CCNC: 19 U/L (ref 15–37)
BASOPHILS # BLD AUTO: 0.1 /CMM (ref 0–0.2)
BASOPHILS NFR BLD AUTO: 0.8 % (ref 0–2)
BILIRUB DIRECT SERPL-MCNC: 0.1 MG/DL (ref 0–0.2)
BILIRUB SERPL-MCNC: 0.5 MG/DL (ref 0.2–1)
BUN SERPL-MCNC: 34 MG/DL (ref 7–18)
CALCIUM SERPL-MCNC: 9.3 MG/DL (ref 8.5–10.1)
CHLORIDE SERPL-SCNC: 102 MMOL/L (ref 98–107)
CO2 SERPL-SCNC: 34 MMOL/L (ref 21–32)
CREAT SERPL-MCNC: 1.7 MG/DL (ref 0.6–1.3)
EOSINOPHIL NFR BLD AUTO: 4.8 % (ref 0–6)
GLUCOSE SERPL-MCNC: 113 MG/DL (ref 74–106)
HCT VFR BLD AUTO: 35 % (ref 33–45)
HGB BLD-MCNC: 11.6 G/DL (ref 11.5–14.8)
LYMPHOCYTES NFR BLD AUTO: 2.4 /CMM (ref 0.8–4.8)
LYMPHOCYTES NFR BLD AUTO: 36.2 % (ref 20–44)
MCHC RBC AUTO-ENTMCNC: 33 G/DL (ref 31–36)
MCV RBC AUTO: 93 FL (ref 82–100)
MONOCYTES NFR BLD AUTO: 0.6 /CMM (ref 0.1–1.3)
MONOCYTES NFR BLD AUTO: 9.7 % (ref 2–12)
NEUTROPHILS # BLD AUTO: 3.2 /CMM (ref 1.8–8.9)
NEUTROPHILS NFR BLD AUTO: 48.5 % (ref 43–81)
PH UR STRIP: 5.5 [PH] (ref 5–8)
PLATELET # BLD AUTO: 243 /CMM (ref 150–450)
POTASSIUM SERPL-SCNC: 5.2 MMOL/L (ref 3.5–5.1)
PROT SERPL-MCNC: 6.7 G/DL (ref 6.4–8.2)
RBC # BLD AUTO: 3.81 MIL/UL (ref 4–5.2)
SODIUM SERPL-SCNC: 140 MMOL/L (ref 136–145)
UROBILINOGEN UR STRIP-MCNC: 0.2 EU/DL
WBC NRBC COR # BLD AUTO: 6.7 K/UL (ref 4.3–11)

## 2019-03-29 PROCEDURE — 80076 HEPATIC FUNCTION PANEL: CPT

## 2019-03-29 PROCEDURE — 80048 BASIC METABOLIC PNL TOTAL CA: CPT

## 2019-03-29 PROCEDURE — 86850 RBC ANTIBODY SCREEN: CPT

## 2019-03-29 PROCEDURE — 85025 COMPLETE CBC W/AUTO DIFF WBC: CPT

## 2019-03-29 PROCEDURE — 99284 EMERGENCY DEPT VISIT MOD MDM: CPT

## 2019-03-29 PROCEDURE — 93005 ELECTROCARDIOGRAM TRACING: CPT

## 2019-03-29 PROCEDURE — 85730 THROMBOPLASTIN TIME PARTIAL: CPT

## 2019-03-29 PROCEDURE — 36415 COLL VENOUS BLD VENIPUNCTURE: CPT

## 2019-03-29 PROCEDURE — 81001 URINALYSIS AUTO W/SCOPE: CPT

## 2019-03-29 NOTE — NUR
-------------------------------------------------------------------------------

          *** Note undone in [facility] - 03/29/19 at 1246 by ANUM ***           

-------------------------------------------------------------------------------

URINE COLLECTED AND SENT TO LAB.

## 2019-03-29 NOTE — NUR
PT C/O "When I wipe this morning, I see blood but I don't know whether it is 
coming from my vagina or rectum"  PT IS AAOX2, NOT IN RESPIRATORY DISTRESS, V/S 
STABLE, KEPT RESTED AND COMFORTABLE, WILL CONTINUE TO MONITOR.

## 2019-04-06 ENCOUNTER — HOSPITAL ENCOUNTER (EMERGENCY)
Dept: HOSPITAL 54 - ER | Age: 74
Discharge: HOME | End: 2019-04-06
Payer: MEDICARE

## 2019-04-06 VITALS — HEIGHT: 65 IN | BODY MASS INDEX: 36.65 KG/M2 | WEIGHT: 220 LBS

## 2019-04-06 VITALS — SYSTOLIC BLOOD PRESSURE: 137 MMHG | DIASTOLIC BLOOD PRESSURE: 73 MMHG

## 2019-04-06 DIAGNOSIS — M19.90: ICD-10-CM

## 2019-04-06 DIAGNOSIS — Z79.84: ICD-10-CM

## 2019-04-06 DIAGNOSIS — R00.2: ICD-10-CM

## 2019-04-06 DIAGNOSIS — F41.9: ICD-10-CM

## 2019-04-06 DIAGNOSIS — I10: ICD-10-CM

## 2019-04-06 DIAGNOSIS — Z79.82: ICD-10-CM

## 2019-04-06 DIAGNOSIS — Z98.890: ICD-10-CM

## 2019-04-06 DIAGNOSIS — M79.7: ICD-10-CM

## 2019-04-06 DIAGNOSIS — E11.40: ICD-10-CM

## 2019-04-06 DIAGNOSIS — F32.9: ICD-10-CM

## 2019-04-06 DIAGNOSIS — Z90.710: ICD-10-CM

## 2019-04-06 DIAGNOSIS — Z79.899: ICD-10-CM

## 2019-04-06 DIAGNOSIS — F03.90: ICD-10-CM

## 2019-04-06 DIAGNOSIS — R19.7: Primary | ICD-10-CM

## 2019-04-06 DIAGNOSIS — Z90.89: ICD-10-CM

## 2019-04-06 DIAGNOSIS — F17.200: ICD-10-CM

## 2019-04-06 LAB
ALBUMIN SERPL BCP-MCNC: 3.9 G/DL (ref 3.4–5)
ALP SERPL-CCNC: 59 U/L (ref 46–116)
ALT SERPL W P-5'-P-CCNC: 30 U/L (ref 12–78)
AST SERPL W P-5'-P-CCNC: 21 U/L (ref 15–37)
BASOPHILS # BLD AUTO: 0 /CMM (ref 0–0.2)
BASOPHILS NFR BLD AUTO: 0.5 % (ref 0–2)
BILIRUB DIRECT SERPL-MCNC: 0.1 MG/DL (ref 0–0.2)
BILIRUB SERPL-MCNC: 0.3 MG/DL (ref 0.2–1)
BUN SERPL-MCNC: 21 MG/DL (ref 7–18)
CALCIUM SERPL-MCNC: 8.9 MG/DL (ref 8.5–10.1)
CHLORIDE SERPL-SCNC: 104 MMOL/L (ref 98–107)
CO2 SERPL-SCNC: 32 MMOL/L (ref 21–32)
CREAT SERPL-MCNC: 1.3 MG/DL (ref 0.6–1.3)
EOSINOPHIL NFR BLD AUTO: 2.1 % (ref 0–6)
GLUCOSE SERPL-MCNC: 111 MG/DL (ref 74–106)
HCT VFR BLD AUTO: 38 % (ref 33–45)
HGB BLD-MCNC: 12.4 G/DL (ref 11.5–14.8)
LIPASE SERPL-CCNC: 126 U/L (ref 73–393)
LYMPHOCYTES NFR BLD AUTO: 2.1 /CMM (ref 0.8–4.8)
LYMPHOCYTES NFR BLD AUTO: 30.1 % (ref 20–44)
MCHC RBC AUTO-ENTMCNC: 33 G/DL (ref 31–36)
MCV RBC AUTO: 94 FL (ref 82–100)
MONOCYTES NFR BLD AUTO: 0.6 /CMM (ref 0.1–1.3)
MONOCYTES NFR BLD AUTO: 8.8 % (ref 2–12)
NEUTROPHILS # BLD AUTO: 4 /CMM (ref 1.8–8.9)
NEUTROPHILS NFR BLD AUTO: 58.5 % (ref 43–81)
PH UR STRIP: 7.5 [PH] (ref 5–8)
PLATELET # BLD AUTO: 293 /CMM (ref 150–450)
POTASSIUM SERPL-SCNC: 4.9 MMOL/L (ref 3.5–5.1)
PROT SERPL-MCNC: 7.4 G/DL (ref 6.4–8.2)
PROT UR QL STRIP: 100 MG/DL
RBC # BLD AUTO: 4.06 MIL/UL (ref 4–5.2)
RBC #/AREA URNS HPF: (no result) /HPF (ref 0–2)
SODIUM SERPL-SCNC: 140 MMOL/L (ref 136–145)
UROBILINOGEN UR STRIP-MCNC: 0.2 EU/DL
WBC #/AREA URNS HPF: (no result) /HPF (ref 0–3)
WBC NRBC COR # BLD AUTO: 6.9 K/UL (ref 4.3–11)

## 2019-04-06 PROCEDURE — 85025 COMPLETE CBC W/AUTO DIFF WBC: CPT

## 2019-04-06 PROCEDURE — 99284 EMERGENCY DEPT VISIT MOD MDM: CPT

## 2019-04-06 PROCEDURE — 80076 HEPATIC FUNCTION PANEL: CPT

## 2019-04-06 PROCEDURE — 80048 BASIC METABOLIC PNL TOTAL CA: CPT

## 2019-04-06 PROCEDURE — 85730 THROMBOPLASTIN TIME PARTIAL: CPT

## 2019-04-06 PROCEDURE — 87040 BLOOD CULTURE FOR BACTERIA: CPT

## 2019-04-06 PROCEDURE — 84484 ASSAY OF TROPONIN QUANT: CPT

## 2019-04-06 PROCEDURE — 71045 X-RAY EXAM CHEST 1 VIEW: CPT

## 2019-04-06 PROCEDURE — 81001 URINALYSIS AUTO W/SCOPE: CPT

## 2019-04-06 PROCEDURE — 83690 ASSAY OF LIPASE: CPT

## 2019-04-06 PROCEDURE — 93005 ELECTROCARDIOGRAM TRACING: CPT

## 2019-04-06 PROCEDURE — 83605 ASSAY OF LACTIC ACID: CPT

## 2019-04-06 PROCEDURE — 74176 CT ABD & PELVIS W/O CONTRAST: CPT

## 2019-04-06 PROCEDURE — 36415 COLL VENOUS BLD VENIPUNCTURE: CPT

## 2019-04-06 PROCEDURE — 96360 HYDRATION IV INFUSION INIT: CPT

## 2019-07-30 ENCOUNTER — HOSPITAL ENCOUNTER (OUTPATIENT)
Dept: HOSPITAL 10 - RAD | Age: 74
Discharge: HOME | End: 2019-07-30
Attending: INTERNAL MEDICINE
Payer: MEDICARE

## 2019-07-30 ENCOUNTER — HOSPITAL ENCOUNTER (OUTPATIENT)
Dept: HOSPITAL 91 - RAD | Age: 74
Discharge: HOME | End: 2019-07-30
Payer: MEDICARE

## 2019-07-30 DIAGNOSIS — Z01.818: Primary | ICD-10-CM

## 2019-07-30 PROCEDURE — 71046 X-RAY EXAM CHEST 2 VIEWS: CPT

## 2019-09-27 ENCOUNTER — HOSPITAL ENCOUNTER (EMERGENCY)
Dept: HOSPITAL 54 - ER | Age: 74
Discharge: HOME | End: 2019-09-27
Payer: MEDICARE

## 2019-09-27 VITALS — WEIGHT: 200 LBS | HEIGHT: 66 IN | BODY MASS INDEX: 32.14 KG/M2

## 2019-09-27 VITALS — SYSTOLIC BLOOD PRESSURE: 108 MMHG | DIASTOLIC BLOOD PRESSURE: 60 MMHG

## 2019-09-27 DIAGNOSIS — F03.90: ICD-10-CM

## 2019-09-27 DIAGNOSIS — R09.02: ICD-10-CM

## 2019-09-27 DIAGNOSIS — F41.9: ICD-10-CM

## 2019-09-27 DIAGNOSIS — E11.22: ICD-10-CM

## 2019-09-27 DIAGNOSIS — E78.00: ICD-10-CM

## 2019-09-27 DIAGNOSIS — E11.40: ICD-10-CM

## 2019-09-27 DIAGNOSIS — Z90.89: ICD-10-CM

## 2019-09-27 DIAGNOSIS — Z90.710: ICD-10-CM

## 2019-09-27 DIAGNOSIS — I10: ICD-10-CM

## 2019-09-27 DIAGNOSIS — F32.9: ICD-10-CM

## 2019-09-27 DIAGNOSIS — E86.0: ICD-10-CM

## 2019-09-27 DIAGNOSIS — Z79.82: ICD-10-CM

## 2019-09-27 DIAGNOSIS — J44.9: ICD-10-CM

## 2019-09-27 DIAGNOSIS — M79.7: ICD-10-CM

## 2019-09-27 DIAGNOSIS — E87.5: ICD-10-CM

## 2019-09-27 DIAGNOSIS — L03.115: ICD-10-CM

## 2019-09-27 DIAGNOSIS — F17.200: ICD-10-CM

## 2019-09-27 DIAGNOSIS — L03.116: Primary | ICD-10-CM

## 2019-09-27 LAB
ALBUMIN SERPL BCP-MCNC: 3.7 G/DL (ref 3.4–5)
ALP SERPL-CCNC: 53 U/L (ref 46–116)
ALT SERPL W P-5'-P-CCNC: 26 U/L (ref 12–78)
AST SERPL W P-5'-P-CCNC: 23 U/L (ref 15–37)
BASOPHILS # BLD AUTO: 0.1 /CMM (ref 0–0.2)
BASOPHILS NFR BLD AUTO: 1 % (ref 0–2)
BILIRUB DIRECT SERPL-MCNC: 0.1 MG/DL (ref 0–0.2)
BILIRUB SERPL-MCNC: 0.2 MG/DL (ref 0.2–1)
BUN SERPL-MCNC: 26 MG/DL (ref 7–18)
CALCIUM SERPL-MCNC: 9.6 MG/DL (ref 8.5–10.1)
CHLORIDE SERPL-SCNC: 102 MMOL/L (ref 98–107)
CO2 SERPL-SCNC: 31 MMOL/L (ref 21–32)
CREAT SERPL-MCNC: 1.3 MG/DL (ref 0.6–1.3)
EOSINOPHIL NFR BLD AUTO: 4 % (ref 0–6)
GLUCOSE SERPL-MCNC: 123 MG/DL (ref 74–106)
HCT VFR BLD AUTO: 35 % (ref 33–45)
HGB BLD-MCNC: 11.3 G/DL (ref 11.5–14.8)
LYMPHOCYTES NFR BLD AUTO: 2.3 /CMM (ref 0.8–4.8)
LYMPHOCYTES NFR BLD AUTO: 33.6 % (ref 20–44)
MCHC RBC AUTO-ENTMCNC: 32 G/DL (ref 31–36)
MCV RBC AUTO: 96 FL (ref 82–100)
MONOCYTES NFR BLD AUTO: 0.8 /CMM (ref 0.1–1.3)
MONOCYTES NFR BLD AUTO: 11.3 % (ref 2–12)
NEUTROPHILS # BLD AUTO: 3.5 /CMM (ref 1.8–8.9)
NEUTROPHILS NFR BLD AUTO: 50.1 % (ref 43–81)
NT-PROBNP SERPL-MCNC: 141 PG/ML (ref 0–125)
PLATELET # BLD AUTO: 297 /CMM (ref 150–450)
POTASSIUM SERPL-SCNC: 4.4 MMOL/L (ref 3.5–5.1)
PROT SERPL-MCNC: 7.6 G/DL (ref 6.4–8.2)
RBC # BLD AUTO: 3.63 MIL/UL (ref 4–5.2)
SODIUM SERPL-SCNC: 141 MMOL/L (ref 136–145)
WBC NRBC COR # BLD AUTO: 7 K/UL (ref 4.3–11)

## 2019-09-27 PROCEDURE — 71045 X-RAY EXAM CHEST 1 VIEW: CPT

## 2019-09-27 PROCEDURE — 83880 ASSAY OF NATRIURETIC PEPTIDE: CPT

## 2019-09-27 PROCEDURE — 99284 EMERGENCY DEPT VISIT MOD MDM: CPT

## 2019-09-27 PROCEDURE — 85730 THROMBOPLASTIN TIME PARTIAL: CPT

## 2019-09-27 PROCEDURE — 93005 ELECTROCARDIOGRAM TRACING: CPT

## 2019-09-27 PROCEDURE — 96365 THER/PROPH/DIAG IV INF INIT: CPT

## 2019-09-27 PROCEDURE — 80076 HEPATIC FUNCTION PANEL: CPT

## 2019-09-27 PROCEDURE — 84484 ASSAY OF TROPONIN QUANT: CPT

## 2019-09-27 PROCEDURE — 85025 COMPLETE CBC W/AUTO DIFF WBC: CPT

## 2019-09-27 PROCEDURE — 93970 EXTREMITY STUDY: CPT

## 2019-09-27 PROCEDURE — 83605 ASSAY OF LACTIC ACID: CPT

## 2019-09-27 PROCEDURE — 87040 BLOOD CULTURE FOR BACTERIA: CPT

## 2019-09-27 PROCEDURE — 80048 BASIC METABOLIC PNL TOTAL CA: CPT

## 2019-09-27 PROCEDURE — 36415 COLL VENOUS BLD VENIPUNCTURE: CPT

## 2019-10-11 ENCOUNTER — HOSPITAL ENCOUNTER (EMERGENCY)
Dept: HOSPITAL 54 - ER | Age: 74
Discharge: HOME | End: 2019-10-11
Payer: MEDICARE

## 2019-10-11 VITALS — DIASTOLIC BLOOD PRESSURE: 70 MMHG | SYSTOLIC BLOOD PRESSURE: 121 MMHG

## 2019-10-11 VITALS — HEIGHT: 65 IN | BODY MASS INDEX: 38.65 KG/M2 | WEIGHT: 232 LBS

## 2019-10-11 DIAGNOSIS — R21: ICD-10-CM

## 2019-10-11 DIAGNOSIS — Z90.89: ICD-10-CM

## 2019-10-11 DIAGNOSIS — I10: ICD-10-CM

## 2019-10-11 DIAGNOSIS — F41.9: ICD-10-CM

## 2019-10-11 DIAGNOSIS — M79.7: ICD-10-CM

## 2019-10-11 DIAGNOSIS — Z90.710: ICD-10-CM

## 2019-10-11 DIAGNOSIS — Z79.82: ICD-10-CM

## 2019-10-11 DIAGNOSIS — F03.90: ICD-10-CM

## 2019-10-11 DIAGNOSIS — M19.90: ICD-10-CM

## 2019-10-11 DIAGNOSIS — F32.9: ICD-10-CM

## 2019-10-11 DIAGNOSIS — Z79.899: ICD-10-CM

## 2019-10-11 DIAGNOSIS — R60.0: ICD-10-CM

## 2019-10-11 DIAGNOSIS — F17.200: ICD-10-CM

## 2019-10-11 DIAGNOSIS — R07.89: Primary | ICD-10-CM

## 2019-10-11 DIAGNOSIS — E11.40: ICD-10-CM

## 2019-10-11 DIAGNOSIS — Z98.890: ICD-10-CM

## 2019-10-11 LAB
ALBUMIN SERPL BCP-MCNC: 3.9 G/DL (ref 3.4–5)
ALP SERPL-CCNC: 58 U/L (ref 46–116)
ALT SERPL W P-5'-P-CCNC: 24 U/L (ref 12–78)
AST SERPL W P-5'-P-CCNC: 21 U/L (ref 15–37)
BASOPHILS # BLD AUTO: 0 /CMM (ref 0–0.2)
BASOPHILS NFR BLD AUTO: 0.6 % (ref 0–2)
BILIRUB DIRECT SERPL-MCNC: 0 MG/DL (ref 0–0.2)
BILIRUB SERPL-MCNC: 0.2 MG/DL (ref 0.2–1)
BUN SERPL-MCNC: 27 MG/DL (ref 7–18)
CALCIUM SERPL-MCNC: 9.7 MG/DL (ref 8.5–10.1)
CHLORIDE SERPL-SCNC: 101 MMOL/L (ref 98–107)
CO2 SERPL-SCNC: 34 MMOL/L (ref 21–32)
CREAT SERPL-MCNC: 1.2 MG/DL (ref 0.6–1.3)
EOSINOPHIL NFR BLD AUTO: 2.6 % (ref 0–6)
GLUCOSE SERPL-MCNC: 110 MG/DL (ref 74–106)
HCT VFR BLD AUTO: 34 % (ref 33–45)
HGB BLD-MCNC: 11.2 G/DL (ref 11.5–14.8)
LYMPHOCYTES NFR BLD AUTO: 2.8 /CMM (ref 0.8–4.8)
LYMPHOCYTES NFR BLD AUTO: 34.8 % (ref 20–44)
MCHC RBC AUTO-ENTMCNC: 33 G/DL (ref 31–36)
MCV RBC AUTO: 95 FL (ref 82–100)
MONOCYTES NFR BLD AUTO: 0.8 /CMM (ref 0.1–1.3)
MONOCYTES NFR BLD AUTO: 10.5 % (ref 2–12)
NEUTROPHILS # BLD AUTO: 4.1 /CMM (ref 1.8–8.9)
NEUTROPHILS NFR BLD AUTO: 51.5 % (ref 43–81)
NT-PROBNP SERPL-MCNC: 237 PG/ML (ref 0–125)
PLATELET # BLD AUTO: 373 /CMM (ref 150–450)
POTASSIUM SERPL-SCNC: 3.9 MMOL/L (ref 3.5–5.1)
PROT SERPL-MCNC: 7.9 G/DL (ref 6.4–8.2)
RBC # BLD AUTO: 3.59 MIL/UL (ref 4–5.2)
SODIUM SERPL-SCNC: 140 MMOL/L (ref 136–145)
WBC NRBC COR # BLD AUTO: 8 K/UL (ref 4.3–11)

## 2019-10-11 PROCEDURE — 84484 ASSAY OF TROPONIN QUANT: CPT

## 2019-10-11 PROCEDURE — 85730 THROMBOPLASTIN TIME PARTIAL: CPT

## 2019-10-11 PROCEDURE — 36415 COLL VENOUS BLD VENIPUNCTURE: CPT

## 2019-10-11 PROCEDURE — 83880 ASSAY OF NATRIURETIC PEPTIDE: CPT

## 2019-10-11 PROCEDURE — 80076 HEPATIC FUNCTION PANEL: CPT

## 2019-10-11 PROCEDURE — 93005 ELECTROCARDIOGRAM TRACING: CPT

## 2019-10-11 PROCEDURE — 99284 EMERGENCY DEPT VISIT MOD MDM: CPT

## 2019-10-11 PROCEDURE — 71275 CT ANGIOGRAPHY CHEST: CPT

## 2019-10-11 PROCEDURE — 80048 BASIC METABOLIC PNL TOTAL CA: CPT

## 2019-10-11 PROCEDURE — 85025 COMPLETE CBC W/AUTO DIFF WBC: CPT

## 2019-12-01 ENCOUNTER — HOSPITAL ENCOUNTER (EMERGENCY)
Dept: HOSPITAL 54 - ER | Age: 74
Discharge: HOME | End: 2019-12-01
Payer: MEDICARE

## 2019-12-01 VITALS — HEIGHT: 65 IN | WEIGHT: 217 LBS | BODY MASS INDEX: 36.15 KG/M2

## 2019-12-01 VITALS — SYSTOLIC BLOOD PRESSURE: 127 MMHG | DIASTOLIC BLOOD PRESSURE: 89 MMHG

## 2019-12-01 DIAGNOSIS — M19.90: ICD-10-CM

## 2019-12-01 DIAGNOSIS — F41.9: ICD-10-CM

## 2019-12-01 DIAGNOSIS — R10.30: ICD-10-CM

## 2019-12-01 DIAGNOSIS — Z90.710: ICD-10-CM

## 2019-12-01 DIAGNOSIS — Z79.899: ICD-10-CM

## 2019-12-01 DIAGNOSIS — F32.9: ICD-10-CM

## 2019-12-01 DIAGNOSIS — I10: ICD-10-CM

## 2019-12-01 DIAGNOSIS — Z90.89: ICD-10-CM

## 2019-12-01 DIAGNOSIS — M79.7: ICD-10-CM

## 2019-12-01 DIAGNOSIS — N28.1: ICD-10-CM

## 2019-12-01 DIAGNOSIS — F17.200: ICD-10-CM

## 2019-12-01 DIAGNOSIS — I48.91: ICD-10-CM

## 2019-12-01 DIAGNOSIS — M79.661: ICD-10-CM

## 2019-12-01 DIAGNOSIS — E11.40: ICD-10-CM

## 2019-12-01 DIAGNOSIS — G89.29: ICD-10-CM

## 2019-12-01 DIAGNOSIS — M79.662: Primary | ICD-10-CM

## 2019-12-01 DIAGNOSIS — Z98.890: ICD-10-CM

## 2019-12-01 DIAGNOSIS — M54.5: ICD-10-CM

## 2019-12-01 DIAGNOSIS — Z79.82: ICD-10-CM

## 2019-12-01 DIAGNOSIS — Z79.84: ICD-10-CM

## 2019-12-01 DIAGNOSIS — F03.90: ICD-10-CM

## 2019-12-01 LAB
ALBUMIN SERPL BCP-MCNC: 3.9 G/DL (ref 3.4–5)
ALP SERPL-CCNC: 62 U/L (ref 46–116)
ALT SERPL W P-5'-P-CCNC: 28 U/L (ref 12–78)
AST SERPL W P-5'-P-CCNC: 17 U/L (ref 15–37)
BASOPHILS # BLD AUTO: 0.1 /CMM (ref 0–0.2)
BASOPHILS NFR BLD AUTO: 0.8 % (ref 0–2)
BILIRUB DIRECT SERPL-MCNC: 0 MG/DL (ref 0–0.2)
BILIRUB SERPL-MCNC: 0.1 MG/DL (ref 0.2–1)
BUN SERPL-MCNC: 33 MG/DL (ref 7–18)
CALCIUM SERPL-MCNC: 9.6 MG/DL (ref 8.5–10.1)
CHLORIDE SERPL-SCNC: 102 MMOL/L (ref 98–107)
CO2 SERPL-SCNC: 30 MMOL/L (ref 21–32)
CREAT SERPL-MCNC: 1.2 MG/DL (ref 0.6–1.3)
EOSINOPHIL NFR BLD AUTO: 2.2 % (ref 0–6)
GLUCOSE SERPL-MCNC: 142 MG/DL (ref 74–106)
HCT VFR BLD AUTO: 42 % (ref 33–45)
HGB BLD-MCNC: 13.2 G/DL (ref 11.5–14.8)
LIPASE SERPL-CCNC: 163 U/L (ref 73–393)
LYMPHOCYTES NFR BLD AUTO: 2.6 /CMM (ref 0.8–4.8)
LYMPHOCYTES NFR BLD AUTO: 27.3 % (ref 20–44)
MCHC RBC AUTO-ENTMCNC: 31 G/DL (ref 31–36)
MCV RBC AUTO: 96 FL (ref 82–100)
MONOCYTES NFR BLD AUTO: 0.8 /CMM (ref 0.1–1.3)
MONOCYTES NFR BLD AUTO: 8.1 % (ref 2–12)
NEUTROPHILS # BLD AUTO: 5.9 /CMM (ref 1.8–8.9)
NEUTROPHILS NFR BLD AUTO: 61.6 % (ref 43–81)
PH UR STRIP: 6.5 [PH] (ref 5–8)
PLATELET # BLD AUTO: 352 /CMM (ref 150–450)
POTASSIUM SERPL-SCNC: 4.4 MMOL/L (ref 3.5–5.1)
PROT SERPL-MCNC: 7.9 G/DL (ref 6.4–8.2)
RBC # BLD AUTO: 4.38 MIL/UL (ref 4–5.2)
RBC #/AREA URNS HPF: (no result) /HPF (ref 0–2)
SODIUM SERPL-SCNC: 141 MMOL/L (ref 136–145)
UROBILINOGEN UR STRIP-MCNC: 0.2 EU/DL
WBC #/AREA URNS HPF: (no result) /HPF (ref 0–3)
WBC NRBC COR # BLD AUTO: 9.5 K/UL (ref 4.3–11)

## 2019-12-01 NOTE — NUR
BIB SELF C/O BILATERAL LOWER LEG PAIN FOR 2 MONTHS MUCH WORSE TODAY, DENIES 
INJURY. +HIP PAIN, TO ER BED 12, HOOKED TO MONITOR, CHANGED TO HOSP GOWN, 
AWAITING MD CARSON.

## 2020-01-01 ENCOUNTER — HOSPITAL ENCOUNTER (INPATIENT)
Dept: HOSPITAL 54 - ER | Age: 75
LOS: 5 days | Discharge: HOME | DRG: 291 | End: 2020-01-06
Attending: FAMILY MEDICINE | Admitting: INTERNAL MEDICINE
Payer: MEDICARE

## 2020-01-01 VITALS — DIASTOLIC BLOOD PRESSURE: 71 MMHG | SYSTOLIC BLOOD PRESSURE: 118 MMHG

## 2020-01-01 VITALS — HEIGHT: 65 IN | BODY MASS INDEX: 38.32 KG/M2 | WEIGHT: 230 LBS

## 2020-01-01 DIAGNOSIS — G47.33: ICD-10-CM

## 2020-01-01 DIAGNOSIS — Z79.01: ICD-10-CM

## 2020-01-01 DIAGNOSIS — E11.65: ICD-10-CM

## 2020-01-01 DIAGNOSIS — I11.0: Primary | ICD-10-CM

## 2020-01-01 DIAGNOSIS — F41.9: ICD-10-CM

## 2020-01-01 DIAGNOSIS — E78.5: ICD-10-CM

## 2020-01-01 DIAGNOSIS — F17.210: ICD-10-CM

## 2020-01-01 DIAGNOSIS — Z91.14: ICD-10-CM

## 2020-01-01 DIAGNOSIS — I50.33: ICD-10-CM

## 2020-01-01 DIAGNOSIS — J96.01: ICD-10-CM

## 2020-01-01 DIAGNOSIS — F03.90: ICD-10-CM

## 2020-01-01 DIAGNOSIS — I48.91: ICD-10-CM

## 2020-01-01 DIAGNOSIS — R07.81: ICD-10-CM

## 2020-01-01 DIAGNOSIS — J44.1: ICD-10-CM

## 2020-01-01 DIAGNOSIS — F32.9: ICD-10-CM

## 2020-01-01 DIAGNOSIS — E11.51: ICD-10-CM

## 2020-01-01 LAB
ALBUMIN SERPL BCP-MCNC: 3 G/DL (ref 3.4–5)
ALP SERPL-CCNC: 67 U/L (ref 46–116)
ALT SERPL W P-5'-P-CCNC: 29 U/L (ref 12–78)
AST SERPL W P-5'-P-CCNC: 16 U/L (ref 15–37)
BASOPHILS # BLD AUTO: 0 /CMM (ref 0–0.2)
BASOPHILS NFR BLD AUTO: 0.5 % (ref 0–2)
BILIRUB DIRECT SERPL-MCNC: 0 MG/DL (ref 0–0.2)
BILIRUB SERPL-MCNC: 0.1 MG/DL (ref 0.2–1)
BUN SERPL-MCNC: 32 MG/DL (ref 7–18)
CALCIUM SERPL-MCNC: 9.1 MG/DL (ref 8.5–10.1)
CHLORIDE SERPL-SCNC: 104 MMOL/L (ref 98–107)
CO2 SERPL-SCNC: 35 MMOL/L (ref 21–32)
CREAT SERPL-MCNC: 1.1 MG/DL (ref 0.6–1.3)
EOSINOPHIL NFR BLD AUTO: 3.5 % (ref 0–6)
GLUCOSE SERPL-MCNC: 129 MG/DL (ref 74–106)
HCT VFR BLD AUTO: 37 % (ref 33–45)
HGB BLD-MCNC: 11.5 G/DL (ref 11.5–14.8)
LYMPHOCYTES NFR BLD AUTO: 1 /CMM (ref 0.8–4.8)
LYMPHOCYTES NFR BLD AUTO: 13.5 % (ref 20–44)
MCHC RBC AUTO-ENTMCNC: 32 G/DL (ref 31–36)
MCV RBC AUTO: 96 FL (ref 82–100)
MONOCYTES NFR BLD AUTO: 0.5 /CMM (ref 0.1–1.3)
MONOCYTES NFR BLD AUTO: 7.1 % (ref 2–12)
NEUTROPHILS # BLD AUTO: 5.6 /CMM (ref 1.8–8.9)
NEUTROPHILS NFR BLD AUTO: 75.4 % (ref 43–81)
NT-PROBNP SERPL-MCNC: 1389 PG/ML (ref 0–125)
PLATELET # BLD AUTO: 281 /CMM (ref 150–450)
POTASSIUM SERPL-SCNC: 4.7 MMOL/L (ref 3.5–5.1)
PROT SERPL-MCNC: 7.1 G/DL (ref 6.4–8.2)
RBC # BLD AUTO: 3.82 MIL/UL (ref 4–5.2)
SODIUM SERPL-SCNC: 142 MMOL/L (ref 136–145)
WBC NRBC COR # BLD AUTO: 7.4 K/UL (ref 4.3–11)

## 2020-01-01 PROCEDURE — G0378 HOSPITAL OBSERVATION PER HR: HCPCS

## 2020-01-01 RX ADMIN — Medication SCH EACH: at 23:47

## 2020-01-01 RX ADMIN — HUMAN INSULIN PRN UNIT: 100 INJECTION, SOLUTION SUBCUTANEOUS at 23:50

## 2020-01-01 NOTE — NUR
PT AAOX4. AMBULATORY. C/O SOB X4 DAYS, SAT 90 ON RA. UPON ASSESMSMENT RR EVEN 
AND UNLABORED. SAT 92 ON ROOM AIR. PLACED ON 2L NC. ON MONITOR AND PULSE OX. MD 
AT BEDSIDE.

## 2020-01-01 NOTE — NUR
RN ADMISSION NOTES



AFTER REPORT RECEIVED FROM DINESH PAUL RN. PATIENT ARRIVED ON THE UNIT AT 2100 VIA GURNEY. 
PATIENT ON TELE MONITORING. PATIENT PLACED ON O2 2 LPM WITH CURRENT READING AT 90% O2 SAT, 
TOLERATING WELL, NO COMPLAINTS. PATIENT WEIGHED VIA BED SCALE. VITAL SIGNS TAKEN. ALL 
QUESTIONS AND CONCERNS ADDRESSED. BELONGINGS CHECKED AND INSPECTED BY MORIS BARRIENTOS. SAFETY 
PRECAUTIONS IMPLEMENTED; CALL LIGHT WITHIN REACH, BED LOW, BED LOCKED, BILATERAL UPPER SIDE 
RAILS UP. WILL CONTINUE TO MONITOR.

## 2020-01-02 VITALS — DIASTOLIC BLOOD PRESSURE: 76 MMHG | SYSTOLIC BLOOD PRESSURE: 116 MMHG

## 2020-01-02 VITALS — DIASTOLIC BLOOD PRESSURE: 74 MMHG | SYSTOLIC BLOOD PRESSURE: 114 MMHG

## 2020-01-02 VITALS — DIASTOLIC BLOOD PRESSURE: 78 MMHG | SYSTOLIC BLOOD PRESSURE: 118 MMHG

## 2020-01-02 VITALS — SYSTOLIC BLOOD PRESSURE: 122 MMHG | DIASTOLIC BLOOD PRESSURE: 82 MMHG

## 2020-01-02 VITALS — DIASTOLIC BLOOD PRESSURE: 80 MMHG | SYSTOLIC BLOOD PRESSURE: 126 MMHG

## 2020-01-02 VITALS — SYSTOLIC BLOOD PRESSURE: 126 MMHG | DIASTOLIC BLOOD PRESSURE: 80 MMHG

## 2020-01-02 VITALS — SYSTOLIC BLOOD PRESSURE: 124 MMHG | DIASTOLIC BLOOD PRESSURE: 74 MMHG

## 2020-01-02 VITALS — DIASTOLIC BLOOD PRESSURE: 80 MMHG | SYSTOLIC BLOOD PRESSURE: 120 MMHG

## 2020-01-02 VITALS — DIASTOLIC BLOOD PRESSURE: 66 MMHG | SYSTOLIC BLOOD PRESSURE: 114 MMHG

## 2020-01-02 VITALS — DIASTOLIC BLOOD PRESSURE: 57 MMHG | SYSTOLIC BLOOD PRESSURE: 93 MMHG

## 2020-01-02 VITALS — SYSTOLIC BLOOD PRESSURE: 120 MMHG | DIASTOLIC BLOOD PRESSURE: 76 MMHG

## 2020-01-02 LAB
ALBUMIN SERPL BCP-MCNC: 2.9 G/DL (ref 3.4–5)
ALP SERPL-CCNC: 58 U/L (ref 46–116)
ALT SERPL W P-5'-P-CCNC: 29 U/L (ref 12–78)
AST SERPL W P-5'-P-CCNC: 12 U/L (ref 15–37)
BASOPHILS # BLD AUTO: 0 /CMM (ref 0–0.2)
BASOPHILS NFR BLD AUTO: 0.2 % (ref 0–2)
BILIRUB SERPL-MCNC: 0.1 MG/DL (ref 0.2–1)
BUN SERPL-MCNC: 28 MG/DL (ref 7–18)
CALCIUM SERPL-MCNC: 9.1 MG/DL (ref 8.5–10.1)
CHLORIDE SERPL-SCNC: 102 MMOL/L (ref 98–107)
CHOLEST SERPL-MCNC: 166 MG/DL (ref ?–200)
CO2 SERPL-SCNC: 29 MMOL/L (ref 21–32)
CREAT SERPL-MCNC: 1 MG/DL (ref 0.6–1.3)
EOSINOPHIL NFR BLD AUTO: 0.1 % (ref 0–6)
GLUCOSE SERPL-MCNC: 137 MG/DL (ref 74–106)
HCT VFR BLD AUTO: 36 % (ref 33–45)
HDLC SERPL-MCNC: 60 MG/DL (ref 40–60)
HGB BLD-MCNC: 11.2 G/DL (ref 11.5–14.8)
LDLC SERPL DIRECT ASSAY-MCNC: 100 MG/DL (ref 0–99)
LYMPHOCYTES NFR BLD AUTO: 0.7 /CMM (ref 0.8–4.8)
LYMPHOCYTES NFR BLD AUTO: 8.2 % (ref 20–44)
MAGNESIUM SERPL-MCNC: 2.2 MG/DL (ref 1.8–2.4)
MCHC RBC AUTO-ENTMCNC: 32 G/DL (ref 31–36)
MCV RBC AUTO: 95 FL (ref 82–100)
MONOCYTES NFR BLD AUTO: 0.2 /CMM (ref 0.1–1.3)
MONOCYTES NFR BLD AUTO: 2.7 % (ref 2–12)
NEUTROPHILS # BLD AUTO: 7.1 /CMM (ref 1.8–8.9)
NEUTROPHILS NFR BLD AUTO: 88.8 % (ref 43–81)
NT-PROBNP SERPL-MCNC: 1873 PG/ML (ref 0–125)
PHOSPHATE SERPL-MCNC: 3.5 MG/DL (ref 2.5–4.9)
PLATELET # BLD AUTO: 272 /CMM (ref 150–450)
POTASSIUM SERPL-SCNC: 4.8 MMOL/L (ref 3.5–5.1)
PROT SERPL-MCNC: 7.2 G/DL (ref 6.4–8.2)
RBC # BLD AUTO: 3.76 MIL/UL (ref 4–5.2)
SODIUM SERPL-SCNC: 141 MMOL/L (ref 136–145)
TRIGL SERPL-MCNC: 114 MG/DL (ref 30–150)
WBC NRBC COR # BLD AUTO: 8 K/UL (ref 4.3–11)

## 2020-01-02 RX ADMIN — Medication SCH MG: at 03:02

## 2020-01-02 RX ADMIN — LORAZEPAM PRN MG: 2 INJECTION INTRAMUSCULAR; INTRAVENOUS at 14:40

## 2020-01-02 RX ADMIN — INSULIN HUMAN PRN UNIT: 100 INJECTION, SOLUTION PARENTERAL at 06:38

## 2020-01-02 RX ADMIN — Medication SCH MG: at 11:30

## 2020-01-02 RX ADMIN — ALBUTEROL SULFATE SCH MG: 2.5 SOLUTION RESPIRATORY (INHALATION) at 11:30

## 2020-01-02 RX ADMIN — HUMAN INSULIN PRN UNIT: 100 INJECTION, SOLUTION SUBCUTANEOUS at 13:40

## 2020-01-02 RX ADMIN — Medication SCH EACH: at 21:38

## 2020-01-02 RX ADMIN — Medication PRN EACH: at 21:27

## 2020-01-02 RX ADMIN — Medication SCH MG: at 19:28

## 2020-01-02 RX ADMIN — INSULIN HUMAN PRN UNIT: 100 INJECTION, SOLUTION PARENTERAL at 06:30

## 2020-01-02 RX ADMIN — ALBUTEROL SULFATE SCH MG: 2.5 SOLUTION RESPIRATORY (INHALATION) at 03:03

## 2020-01-02 RX ADMIN — ALBUTEROL SULFATE SCH MG: 2.5 SOLUTION RESPIRATORY (INHALATION) at 14:57

## 2020-01-02 RX ADMIN — ALBUTEROL SULFATE SCH MG: 2.5 SOLUTION RESPIRATORY (INHALATION) at 23:30

## 2020-01-02 RX ADMIN — Medication SCH EACH: at 17:01

## 2020-01-02 RX ADMIN — APIXABAN SCH MG: 5 TABLET, FILM COATED ORAL at 16:55

## 2020-01-02 RX ADMIN — GABAPENTIN SCH MG: 300 CAPSULE ORAL at 13:09

## 2020-01-02 RX ADMIN — GABAPENTIN SCH MG: 400 CAPSULE ORAL at 21:29

## 2020-01-02 RX ADMIN — GABAPENTIN SCH MG: 300 CAPSULE ORAL at 16:53

## 2020-01-02 RX ADMIN — ALBUTEROL SULFATE SCH MG: 2.5 SOLUTION RESPIRATORY (INHALATION) at 19:28

## 2020-01-02 RX ADMIN — Medication SCH MG: at 23:30

## 2020-01-02 RX ADMIN — Medication SCH MG: at 00:13

## 2020-01-02 RX ADMIN — Medication PRN EACH: at 12:15

## 2020-01-02 RX ADMIN — Medication SCH EACH: at 12:00

## 2020-01-02 RX ADMIN — Medication SCH MG: at 08:23

## 2020-01-02 RX ADMIN — ALBUTEROL SULFATE SCH MG: 2.5 SOLUTION RESPIRATORY (INHALATION) at 00:13

## 2020-01-02 RX ADMIN — Medication SCH EACH: at 07:57

## 2020-01-02 RX ADMIN — ALBUTEROL SULFATE SCH MG: 2.5 SOLUTION RESPIRATORY (INHALATION) at 08:23

## 2020-01-02 RX ADMIN — Medication SCH MG: at 14:58

## 2020-01-02 RX ADMIN — INSULIN HUMAN PRN UNIT: 100 INJECTION, SOLUTION PARENTERAL at 17:01

## 2020-01-02 RX ADMIN — HUMAN INSULIN PRN UNIT: 100 INJECTION, SOLUTION SUBCUTANEOUS at 21:45

## 2020-01-02 NOTE — NUR
RN NOTE



PATIENT AGITATED, WANTS THE ATIVAN NOW. RN TO ADMINISTER . 



RHYTHM REMAINS THE SAME AFIB 90'S

## 2020-01-02 NOTE — NUR
RN OPENING NOTE



RECEIVED PATIENT IN BED RESTING WITH HOB ELEVATED. PATIENT IS WATCHING TV. A&O X3. BREATHING 
IS EVEN AND NON LABORED, NO SOB NOTED. ON O2 3 LITERS VIA NC AND TOLERATED WELL. IN NO 
APPARENT DISTRESS NOTED. BED IS LOWERED AND LOCKED FOR SAFETY. CALL LIGHT IS WITHIN EASY 
REACH. INSTRUCTED PATIENT TO USE CALL LIGHT WHEN IN NEED OF ASSISTANCE. WILL CONTINUE TO 
MONITOR.

## 2020-01-02 NOTE — NUR
RN NOTE



RECEIVED PATIENT ON AMIODARONE 0.5 MG/MIN VIA IV. PER DAY SHIFT RN, AMIODARONE 0.5 MG/MIN IV 
BEGAN AT 1612.

## 2020-01-02 NOTE — NUR
RN ADMIT NOTE



PATIENT TRANSFERED FROM [address] [place] [address] SURGICAL UNIT. ALERT ORIENTEDX4 , NEW ONSET ATRIAL 
FIBRILATION. CARDIOLOGY ORDERED AMIODARONE DRIP. RN TO START BOLUS AND CONTINUE IV, MONITOR 
VITALS AND RHYTHM EVERY HOUR. 



PATIENT AMBULATORY WITH ASIST, SKIN IS INTACT, IV PATENT RIGHT AC AND RN PLACED ANOTHER IV L 
HAND.

RN COUNSELED PATIENT ON REFUSAL OF INSULIN INJECTION PER MD ORDER. PATIENT ORIGIANLLY WAS 
REFUSING ADMINISTRATION " i DONT WANT TO START TAKING INJECTION, ONLY WANT ORAL MEDS". RN 
INFORMED NEED FOR INSULIN DUE TO STEROIDS. 



SIDE RAILS UP CALL LIGHT WITHIN REACH,  ALL NEEDS MET AT THIS TIME. RECOMEDATION TO REST 
WITH OXYGEN AT THIS TIME.

## 2020-01-02 NOTE — NUR
transfer to [facility]



PT TRANSFERRED TO [facility] TO NEW ONSET OF AFIB. REPORT GIVEN TO KAYLI ALLEN. ENDORSED TO  
RN RE: AMIODARONE INFUSION.

## 2020-01-02 NOTE — NUR
RN NOTE



RN CONTACTED PRIMARY MD TO CONTINUE GABAPENTIN KLONOPIN AND ATIVAN ORDERS. MD OK TO 
CONTINUE. 



PATIENT IS AGIATED, ALL VITALS WNL, O2 AT 3 LITERS SATURTION 93% AT REST.

## 2020-01-02 NOTE — NUR
Patient is alert,lives locally with family. States she ambulates without assistive device 
inside household and uses a walker outside mobility. She is independent with adl's and has 
good family support. Pcp is Dr. Aliya Matthews [phone]. She plan to return home once 
discharge.  






-------------------------------------------------------------------------------

Addendum: 01/02/20 at 2141 by PRABHU KELLY RN

-------------------------------------------------------------------------------

Amended: Links added.

## 2020-01-02 NOTE — NUR
TELE RN OPENING NOTES



RECEIVED PT IN BED. AWAKE, ALERT AND ORIENTED X4. NO CARDIAC OR [id number] DISTRESS NOTED. 
BREATH SOUNDS CLEAR. BREATHING EVEN AND UNLABORED. O2 SAT 97% ON ROOM AIR. PT IS AMBULATORY. 
SKIN INTACT. IV SITE INTACT. ON R AC G20. PT REFUSED INSULIN THIS AM. NO COMPLAINTS OF PAIN 
OR DISCOMFORT. ON CARDIAC MONITOR NSR.

## 2020-01-03 VITALS — DIASTOLIC BLOOD PRESSURE: 70 MMHG | SYSTOLIC BLOOD PRESSURE: 139 MMHG

## 2020-01-03 VITALS — DIASTOLIC BLOOD PRESSURE: 71 MMHG | SYSTOLIC BLOOD PRESSURE: 125 MMHG

## 2020-01-03 VITALS — DIASTOLIC BLOOD PRESSURE: 71 MMHG | SYSTOLIC BLOOD PRESSURE: 116 MMHG

## 2020-01-03 VITALS — SYSTOLIC BLOOD PRESSURE: 136 MMHG | DIASTOLIC BLOOD PRESSURE: 71 MMHG

## 2020-01-03 VITALS — DIASTOLIC BLOOD PRESSURE: 88 MMHG | SYSTOLIC BLOOD PRESSURE: 114 MMHG

## 2020-01-03 VITALS — SYSTOLIC BLOOD PRESSURE: 105 MMHG | DIASTOLIC BLOOD PRESSURE: 60 MMHG

## 2020-01-03 LAB
BASOPHILS # BLD AUTO: 0 /CMM (ref 0–0.2)
BASOPHILS NFR BLD AUTO: 0.4 % (ref 0–2)
BUN SERPL-MCNC: 27 MG/DL (ref 7–18)
CALCIUM SERPL-MCNC: 8.9 MG/DL (ref 8.5–10.1)
CHLORIDE SERPL-SCNC: 102 MMOL/L (ref 98–107)
CO2 SERPL-SCNC: 34 MMOL/L (ref 21–32)
CREAT SERPL-MCNC: 1 MG/DL (ref 0.6–1.3)
EOSINOPHIL NFR BLD AUTO: 0.3 % (ref 0–6)
GLUCOSE SERPL-MCNC: 194 MG/DL (ref 74–106)
HCT VFR BLD AUTO: 35 % (ref 33–45)
HGB BLD-MCNC: 11 G/DL (ref 11.5–14.8)
LYMPHOCYTES NFR BLD AUTO: 1.1 /CMM (ref 0.8–4.8)
LYMPHOCYTES NFR BLD AUTO: 9.3 % (ref 20–44)
MAGNESIUM SERPL-MCNC: 2.2 MG/DL (ref 1.8–2.4)
MCHC RBC AUTO-ENTMCNC: 31 G/DL (ref 31–36)
MCV RBC AUTO: 94 FL (ref 82–100)
MONOCYTES NFR BLD AUTO: 0.6 /CMM (ref 0.1–1.3)
MONOCYTES NFR BLD AUTO: 4.7 % (ref 2–12)
NEUTROPHILS # BLD AUTO: 10.6 /CMM (ref 1.8–8.9)
NEUTROPHILS NFR BLD AUTO: 85.3 % (ref 43–81)
PHOSPHATE SERPL-MCNC: 3.4 MG/DL (ref 2.5–4.9)
PLATELET # BLD AUTO: 318 /CMM (ref 150–450)
POTASSIUM SERPL-SCNC: 3.9 MMOL/L (ref 3.5–5.1)
RBC # BLD AUTO: 3.72 MIL/UL (ref 4–5.2)
SODIUM SERPL-SCNC: 142 MMOL/L (ref 136–145)
WBC NRBC COR # BLD AUTO: 12.4 K/UL (ref 4.3–11)

## 2020-01-03 RX ADMIN — GABAPENTIN SCH MG: 300 CAPSULE ORAL at 18:03

## 2020-01-03 RX ADMIN — Medication SCH MG: at 07:50

## 2020-01-03 RX ADMIN — ALBUTEROL SULFATE SCH MG: 2.5 SOLUTION RESPIRATORY (INHALATION) at 07:50

## 2020-01-03 RX ADMIN — ALBUTEROL SULFATE SCH MG: 2.5 SOLUTION RESPIRATORY (INHALATION) at 03:41

## 2020-01-03 RX ADMIN — Medication SCH MG: at 20:27

## 2020-01-03 RX ADMIN — APIXABAN SCH MG: 5 TABLET, FILM COATED ORAL at 08:33

## 2020-01-03 RX ADMIN — Medication SCH EA: at 11:55

## 2020-01-03 RX ADMIN — ALBUTEROL SULFATE SCH MG: 2.5 SOLUTION RESPIRATORY (INHALATION) at 10:35

## 2020-01-03 RX ADMIN — Medication PRN EACH: at 11:42

## 2020-01-03 RX ADMIN — Medication SCH EACH: at 11:57

## 2020-01-03 RX ADMIN — GABAPENTIN SCH MG: 300 CAPSULE ORAL at 08:31

## 2020-01-03 RX ADMIN — Medication SCH MG: at 03:41

## 2020-01-03 RX ADMIN — HUMAN INSULIN PRN UNIT: 100 INJECTION, SOLUTION SUBCUTANEOUS at 22:39

## 2020-01-03 RX ADMIN — DILTIAZEM HYDROCHLORIDE SCH MG: 240 CAPSULE, EXTENDED RELEASE ORAL at 10:28

## 2020-01-03 RX ADMIN — Medication SCH EACH: at 18:03

## 2020-01-03 RX ADMIN — Medication PRN EACH: at 20:50

## 2020-01-03 RX ADMIN — ALBUTEROL SULFATE SCH MG: 2.5 SOLUTION RESPIRATORY (INHALATION) at 20:27

## 2020-01-03 RX ADMIN — Medication SCH MG: at 10:35

## 2020-01-03 RX ADMIN — Medication SCH EACH: at 22:00

## 2020-01-03 RX ADMIN — GABAPENTIN SCH MG: 400 CAPSULE ORAL at 23:32

## 2020-01-03 RX ADMIN — Medication SCH EA: at 18:32

## 2020-01-03 RX ADMIN — Medication SCH EACH: at 07:41

## 2020-01-03 RX ADMIN — ALBUTEROL SULFATE SCH MG: 2.5 SOLUTION RESPIRATORY (INHALATION) at 15:48

## 2020-01-03 RX ADMIN — Medication SCH MG: at 15:48

## 2020-01-03 RX ADMIN — APIXABAN SCH MG: 5 TABLET, FILM COATED ORAL at 18:07

## 2020-01-03 NOTE — NUR
RN OPENING NOTES

 RECEIVED PATIENT RESTING IN BED, A/O X4. NO SOB NOTED. ON O2 AT 3L/MIN NASAL CANNULA. NO 
WHEEZING NOTED. NO COMPLAIN OF PAIN. CALL LIGHT WITHIN REACH. BED ALARM ON. BED IN LOWEST 
AND LOCKED POSITION. WITH MEPILEX DRESSING ON THE RIGHT UPPER OUTER THIGH INTACT, WITH VERY 
SMALL WOUND NOTED. AS PER REPORT, WOUND CARE CONSULTED. ACCORDING TO PATIENT, SHE SCRATCHED 
IT.

## 2020-01-03 NOTE — NUR
RN CLOSING NOTE



PATIENT IS IN BED RESTING WITH HOB ELEVATED, WATCHING TV. A&O X3. BREATHING IS EVEN AND NON 
LABORED. ON O2 3 LPM VIA NC AND TOLERATED WELL. IN NO APPARENT DISTRESS NOTED AT THIS TIME. 
ALL DUE MEDS GIVEN AND TOLERATED WELL. BED IS LOWERED TO LOWEST POSITION AND LOCKED FOR 
SAFETY. CALL LIGHT IS WITHIN EASY REACH. WILL ENDORSE TO AM SHIFT RN FOR PIPPA.

## 2020-01-03 NOTE — NUR
PATIENT SAT IN THE CHAIR EARLIER. WALKED BACK TO THE BED, STEADY GAIT. HOLDING ON TO THE BED 
SIDERAILS. CALL LIGHT WITHIN REACH.

## 2020-01-04 VITALS — SYSTOLIC BLOOD PRESSURE: 144 MMHG | DIASTOLIC BLOOD PRESSURE: 82 MMHG

## 2020-01-04 VITALS — SYSTOLIC BLOOD PRESSURE: 131 MMHG | DIASTOLIC BLOOD PRESSURE: 64 MMHG

## 2020-01-04 VITALS — SYSTOLIC BLOOD PRESSURE: 131 MMHG | DIASTOLIC BLOOD PRESSURE: 78 MMHG

## 2020-01-04 VITALS — SYSTOLIC BLOOD PRESSURE: 132 MMHG | DIASTOLIC BLOOD PRESSURE: 94 MMHG

## 2020-01-04 VITALS — SYSTOLIC BLOOD PRESSURE: 137 MMHG | DIASTOLIC BLOOD PRESSURE: 73 MMHG

## 2020-01-04 VITALS — DIASTOLIC BLOOD PRESSURE: 85 MMHG | SYSTOLIC BLOOD PRESSURE: 122 MMHG

## 2020-01-04 LAB
BASOPHILS # BLD AUTO: 0 /CMM (ref 0–0.2)
BASOPHILS NFR BLD AUTO: 0.2 % (ref 0–2)
BUN SERPL-MCNC: 30 MG/DL (ref 7–18)
CALCIUM SERPL-MCNC: 9 MG/DL (ref 8.5–10.1)
CHLORIDE SERPL-SCNC: 102 MMOL/L (ref 98–107)
CO2 SERPL-SCNC: 32 MMOL/L (ref 21–32)
CREAT SERPL-MCNC: 1 MG/DL (ref 0.6–1.3)
EOSINOPHIL NFR BLD AUTO: 0 % (ref 0–6)
GLUCOSE SERPL-MCNC: 149 MG/DL (ref 74–106)
HCT VFR BLD AUTO: 34 % (ref 33–45)
HGB BLD-MCNC: 10.8 G/DL (ref 11.5–14.8)
LYMPHOCYTES NFR BLD AUTO: 1.2 /CMM (ref 0.8–4.8)
LYMPHOCYTES NFR BLD AUTO: 10.2 % (ref 20–44)
MAGNESIUM SERPL-MCNC: 2.3 MG/DL (ref 1.8–2.4)
MCHC RBC AUTO-ENTMCNC: 32 G/DL (ref 31–36)
MCV RBC AUTO: 95 FL (ref 82–100)
MONOCYTES NFR BLD AUTO: 0.5 /CMM (ref 0.1–1.3)
MONOCYTES NFR BLD AUTO: 4.2 % (ref 2–12)
NEUTROPHILS # BLD AUTO: 10.4 /CMM (ref 1.8–8.9)
NEUTROPHILS NFR BLD AUTO: 85.4 % (ref 43–81)
PHOSPHATE SERPL-MCNC: 3.6 MG/DL (ref 2.5–4.9)
PLATELET # BLD AUTO: 313 /CMM (ref 150–450)
POTASSIUM SERPL-SCNC: 4.9 MMOL/L (ref 3.5–5.1)
RBC # BLD AUTO: 3.59 MIL/UL (ref 4–5.2)
SODIUM SERPL-SCNC: 140 MMOL/L (ref 136–145)
WBC NRBC COR # BLD AUTO: 12.2 K/UL (ref 4.3–11)

## 2020-01-04 PROCEDURE — 05H933Z INSERTION OF INFUSION DEVICE INTO RIGHT BRACHIAL VEIN, PERCUTANEOUS APPROACH: ICD-10-PCS | Performed by: FAMILY MEDICINE

## 2020-01-04 RX ADMIN — LORAZEPAM PRN MG: 2 INJECTION INTRAMUSCULAR; INTRAVENOUS at 13:45

## 2020-01-04 RX ADMIN — CARVEDILOL SCH MG: 6.25 TABLET, FILM COATED ORAL at 20:47

## 2020-01-04 RX ADMIN — ALBUTEROL SULFATE SCH MG: 2.5 SOLUTION RESPIRATORY (INHALATION) at 20:05

## 2020-01-04 RX ADMIN — Medication PRN MG: at 12:13

## 2020-01-04 RX ADMIN — Medication PRN MG: at 11:53

## 2020-01-04 RX ADMIN — INSULIN HUMAN PRN UNIT: 100 INJECTION, SOLUTION PARENTERAL at 12:48

## 2020-01-04 RX ADMIN — Medication SCH MG: at 07:56

## 2020-01-04 RX ADMIN — APIXABAN SCH MG: 5 TABLET, FILM COATED ORAL at 08:34

## 2020-01-04 RX ADMIN — Medication SCH MG: at 20:05

## 2020-01-04 RX ADMIN — ALBUTEROL SULFATE SCH MG: 2.5 SOLUTION RESPIRATORY (INHALATION) at 07:56

## 2020-01-04 RX ADMIN — ACETAMINOPHEN SCH MG: 500 TABLET ORAL at 20:48

## 2020-01-04 RX ADMIN — Medication SCH MG: at 15:49

## 2020-01-04 RX ADMIN — Medication SCH EA: at 18:22

## 2020-01-04 RX ADMIN — DILTIAZEM HYDROCHLORIDE SCH MG: 240 CAPSULE, EXTENDED RELEASE ORAL at 08:35

## 2020-01-04 RX ADMIN — Medication PRN MG: at 11:43

## 2020-01-04 RX ADMIN — Medication SCH TAB: at 11:00

## 2020-01-04 RX ADMIN — CARVEDILOL SCH MG: 6.25 TABLET, FILM COATED ORAL at 10:40

## 2020-01-04 RX ADMIN — MAGNESIUM OXIDE TAB 400 MG (241.3 MG ELEMENTAL MG) SCH MG: 400 (241.3 MG) TAB at 18:05

## 2020-01-04 RX ADMIN — CALCIUM SCH MG: 500 TABLET ORAL at 18:18

## 2020-01-04 RX ADMIN — FUROSEMIDE SCH MG: 40 TABLET ORAL at 10:33

## 2020-01-04 RX ADMIN — Medication PRN MG: at 12:28

## 2020-01-04 RX ADMIN — EZETIMIBE SCH MG: 10 TABLET ORAL at 18:06

## 2020-01-04 RX ADMIN — ACETAMINOPHEN SCH MG: 500 TABLET ORAL at 12:52

## 2020-01-04 RX ADMIN — Medication SCH TAB: at 18:17

## 2020-01-04 RX ADMIN — Medication PRN MG: at 12:23

## 2020-01-04 RX ADMIN — Medication SCH MG: at 00:14

## 2020-01-04 RX ADMIN — Medication SCH EA: at 00:00

## 2020-01-04 RX ADMIN — Medication PRN MG: at 11:38

## 2020-01-04 RX ADMIN — APIXABAN SCH MG: 5 TABLET, FILM COATED ORAL at 18:11

## 2020-01-04 RX ADMIN — Medication PRN MG: at 12:03

## 2020-01-04 RX ADMIN — ALBUTEROL SULFATE SCH MG: 2.5 SOLUTION RESPIRATORY (INHALATION) at 15:49

## 2020-01-04 RX ADMIN — INSULIN HUMAN PRN UNIT: 100 INJECTION, SOLUTION PARENTERAL at 18:13

## 2020-01-04 RX ADMIN — LORAZEPAM PRN MG: 2 INJECTION INTRAMUSCULAR; INTRAVENOUS at 07:23

## 2020-01-04 RX ADMIN — Medication SCH EACH: at 21:58

## 2020-01-04 RX ADMIN — Medication SCH EACH: at 18:14

## 2020-01-04 RX ADMIN — Medication SCH EA: at 13:01

## 2020-01-04 RX ADMIN — Medication PRN MG: at 11:48

## 2020-01-04 RX ADMIN — Medication SCH EACH: at 07:42

## 2020-01-04 RX ADMIN — Medication PRN MG: at 12:18

## 2020-01-04 RX ADMIN — GABAPENTIN SCH MG: 300 CAPSULE ORAL at 18:05

## 2020-01-04 RX ADMIN — Medication SCH MG: at 22:56

## 2020-01-04 RX ADMIN — HUMAN INSULIN PRN UNIT: 100 INJECTION, SOLUTION SUBCUTANEOUS at 22:00

## 2020-01-04 RX ADMIN — ALBUTEROL SULFATE SCH MG: 2.5 SOLUTION RESPIRATORY (INHALATION) at 11:30

## 2020-01-04 RX ADMIN — Medication PRN MG: at 11:58

## 2020-01-04 RX ADMIN — INSULIN HUMAN PRN UNIT: 100 INJECTION, SOLUTION PARENTERAL at 08:43

## 2020-01-04 RX ADMIN — GABAPENTIN SCH MG: 300 CAPSULE ORAL at 08:34

## 2020-01-04 RX ADMIN — TRAZODONE HYDROCHLORIDE SCH MG: 50 TABLET ORAL at 22:36

## 2020-01-04 RX ADMIN — Medication PRN EACH: at 03:13

## 2020-01-04 RX ADMIN — Medication SCH EA: at 10:40

## 2020-01-04 RX ADMIN — Medication SCH EA: at 06:00

## 2020-01-04 RX ADMIN — Medication SCH MG: at 11:30

## 2020-01-04 RX ADMIN — ALBUTEROL SULFATE SCH MG: 2.5 SOLUTION RESPIRATORY (INHALATION) at 22:57

## 2020-01-04 RX ADMIN — Medication SCH MG: at 03:30

## 2020-01-04 RX ADMIN — ALBUTEROL SULFATE SCH MG: 2.5 SOLUTION RESPIRATORY (INHALATION) at 03:30

## 2020-01-04 RX ADMIN — Medication PRN MG: at 12:08

## 2020-01-04 RX ADMIN — POLYETHYLENE GLYCOL 3350 SCH GM: 17 POWDER, FOR SOLUTION ORAL at 10:37

## 2020-01-04 RX ADMIN — Medication SCH EACH: at 12:44

## 2020-01-04 RX ADMIN — GABAPENTIN SCH MG: 400 CAPSULE ORAL at 21:58

## 2020-01-04 RX ADMIN — ALBUTEROL SULFATE SCH MG: 2.5 SOLUTION RESPIRATORY (INHALATION) at 00:14

## 2020-01-04 NOTE — NUR
RN NOTE

AROUND 0720 PT WAS CRYING, CALLING HER , CO ABOUT ANXIETY AND CHEST PAIN, ASKING FOR 
SUBLINGUAL NITROGLYCERIN. VS STABLE, AFIB 80 BEATS/MIN ON TELEMETRY, DENIES SOB. ATIVAN 
OFFERED, AND DR LUÍS REYES, PER HIM DR OMALLEY WILL FOLLOW UP WITH PT. ATIVAN 0.5 MG IV 
GIVEN WILL CONTINUE TO MONITOR.

## 2020-01-04 NOTE — NUR
RN CLOSING NOTES: PATIENT IS RESTING IN BED AT THIS TIME. NO SOB NOTED. NO ACUTE EVENTS 
OVERNIGHT. NO CHEST PAIN NOTED. AMBULATORY. VITALS STABLE. AFEBRILE. CALL LIGHT WITHIN 
REACH. BED IN LOWEST AND LOCKED POSITION.

## 2020-01-04 NOTE — NUR
PT HAD LEFT ac 18 IV INSERTED JUST BEFORE CTA, pt was not able to go through CTA DUE TO IV 
SITE INFILTRATION DURING CONTRAST ADMINISTRATION. PT WILL HAVE MIDLINE INSERTED.

## 2020-01-04 NOTE — NUR
RN NOTE

PT WANTED TO HAVE CTA IN 30 MIN, BUT PER RADIOLOGIST THEY WERE NOT HAVING TIME TO WAIT, SO 
PT AGREED TO DO THE TEST TOMORROW. PT WANTS TO GET ATIVAN DOSE BEFORE CTA, WILL ENDORSE TO 
NIGHT/NEXT SHIFT.

## 2020-01-04 NOTE — NUR
RN NOTE

PER CNA REPORT PT MIDLINE DRESSING [facility] PEELING OFF AND MIDLINE WAS ABOUT TO DISLODGE, 
DRESSING CHANGED , BLOOD RETURN PRESENT, PORT FLUSHES WELL, WILL ENDORSE TO NIGHT SHIFT TO 
MONITOR AND PT INSTRUCTED NOT TO SHOWER TODAY.

## 2020-01-04 NOTE — NUR
ICU/RN: PT IN CT FOR CTA. LEFT AC 18G IV IN PLACE, IV ASSESSED, FLUSHED, NO S/S OF 
INFILTRATION NOTED. BLOOD RETURN NOTED. MAX DOSE OF METOPROLOL ADMINISTERED PER PROTOCOL. PT 
A.FIB ON TELE. PER  OK TO PROCEED EVEN THOUGHT AFTER FULL DOSE HR WAS NOT IN 
50S-60S. DURING EXAM IV WAS TESTED WITH NS FLUSH, DURING CONTRAST INFUSION PIV BLEW AND 
CONTRAST INFILTRATED LEFT ARM. EXAM WAS IMMEDIATELY STOPPED, PIV REMOVED AND HOT PACK 
APPLIED.  CALLED AND NOTIFIED. PRIMARY MD GAVE ORDERS FOR MIDLINE. WILL REATTEMPT 
EXAM IN AM PER PT REQUEST.

## 2020-01-04 NOTE — NUR
PATIENT REFUSED TO TAKE HER OWN MED RIGHT NOW. PATIENT SAID SHE WILL TAKE IT DURING 
BREAKFAST TODAY, WILL ENDORSE TO THE NEXT SHIFT RN. PATIENT IS SITTING IN THE CHAIR AT THIS 
TIME.

## 2020-01-04 NOTE — NUR
RN NOTE

RECEIVED PT ALERT AND ORIENTED IN BED IN SEMI BAIN'S POSITION. PT DENIES PAIN OR 
DISCOMFORT. ON 2L OF O2 VIA NC AND WITHOUT SHORTNESS OF BREATH. TELE MONITOR ON. WILL 
MONITOR.

## 2020-01-05 VITALS — SYSTOLIC BLOOD PRESSURE: 116 MMHG | DIASTOLIC BLOOD PRESSURE: 72 MMHG

## 2020-01-05 VITALS — DIASTOLIC BLOOD PRESSURE: 73 MMHG | SYSTOLIC BLOOD PRESSURE: 143 MMHG

## 2020-01-05 VITALS — DIASTOLIC BLOOD PRESSURE: 82 MMHG | SYSTOLIC BLOOD PRESSURE: 143 MMHG

## 2020-01-05 VITALS — SYSTOLIC BLOOD PRESSURE: 123 MMHG | DIASTOLIC BLOOD PRESSURE: 67 MMHG

## 2020-01-05 VITALS — SYSTOLIC BLOOD PRESSURE: 132 MMHG | DIASTOLIC BLOOD PRESSURE: 73 MMHG

## 2020-01-05 VITALS — SYSTOLIC BLOOD PRESSURE: 129 MMHG | DIASTOLIC BLOOD PRESSURE: 71 MMHG

## 2020-01-05 LAB
BASOPHILS # BLD AUTO: 0 /CMM (ref 0–0.2)
BASOPHILS NFR BLD AUTO: 0.1 % (ref 0–2)
BUN SERPL-MCNC: 29 MG/DL (ref 7–18)
CALCIUM SERPL-MCNC: 9 MG/DL (ref 8.5–10.1)
CHLORIDE SERPL-SCNC: 104 MMOL/L (ref 98–107)
CO2 SERPL-SCNC: 28 MMOL/L (ref 21–32)
CREAT SERPL-MCNC: 1.2 MG/DL (ref 0.6–1.3)
EOSINOPHIL NFR BLD AUTO: 0 % (ref 0–6)
GLUCOSE SERPL-MCNC: 191 MG/DL (ref 74–106)
HCT VFR BLD AUTO: 34 % (ref 33–45)
HGB BLD-MCNC: 10.8 G/DL (ref 11.5–14.8)
LYMPHOCYTES NFR BLD AUTO: 1.3 /CMM (ref 0.8–4.8)
LYMPHOCYTES NFR BLD AUTO: 10.7 % (ref 20–44)
LYMPHOCYTES NFR BLD MANUAL: 19 % (ref 16–48)
MCHC RBC AUTO-ENTMCNC: 32 G/DL (ref 31–36)
MCV RBC AUTO: 95 FL (ref 82–100)
MONOCYTES NFR BLD AUTO: 0.6 /CMM (ref 0.1–1.3)
MONOCYTES NFR BLD AUTO: 5.3 % (ref 2–12)
MONOCYTES NFR BLD MANUAL: 4 % (ref 0–11)
NEUTROPHILS # BLD AUTO: 10.2 /CMM (ref 1.8–8.9)
NEUTROPHILS NFR BLD AUTO: 83.9 % (ref 43–81)
NEUTS BAND NFR BLD MANUAL: 4 % (ref 0–5)
NEUTS SEG NFR BLD MANUAL: 73 % (ref 42–76)
PLATELET # BLD AUTO: 348 /CMM (ref 150–450)
POTASSIUM SERPL-SCNC: 4.5 MMOL/L (ref 3.5–5.1)
RBC # BLD AUTO: 3.55 MIL/UL (ref 4–5.2)
SODIUM SERPL-SCNC: 142 MMOL/L (ref 136–145)
WBC NRBC COR # BLD AUTO: 12.1 K/UL (ref 4.3–11)

## 2020-01-05 RX ADMIN — ALBUTEROL SULFATE SCH MG: 2.5 SOLUTION RESPIRATORY (INHALATION) at 11:09

## 2020-01-05 RX ADMIN — Medication SCH EA: at 13:17

## 2020-01-05 RX ADMIN — ALBUTEROL SULFATE SCH MG: 2.5 SOLUTION RESPIRATORY (INHALATION) at 20:08

## 2020-01-05 RX ADMIN — APIXABAN SCH MG: 5 TABLET, FILM COATED ORAL at 17:31

## 2020-01-05 RX ADMIN — ALBUTEROL SULFATE SCH MG: 2.5 SOLUTION RESPIRATORY (INHALATION) at 03:07

## 2020-01-05 RX ADMIN — MAGNESIUM OXIDE TAB 400 MG (241.3 MG ELEMENTAL MG) SCH MG: 400 (241.3 MG) TAB at 17:30

## 2020-01-05 RX ADMIN — INSULIN HUMAN PRN UNIT: 100 INJECTION, SOLUTION PARENTERAL at 22:29

## 2020-01-05 RX ADMIN — INSULIN HUMAN PRN UNIT: 100 INJECTION, SOLUTION PARENTERAL at 07:53

## 2020-01-05 RX ADMIN — Medication SCH TAB: at 09:00

## 2020-01-05 RX ADMIN — Medication SCH MG: at 20:08

## 2020-01-05 RX ADMIN — ALBUTEROL SULFATE SCH MG: 2.5 SOLUTION RESPIRATORY (INHALATION) at 15:49

## 2020-01-05 RX ADMIN — Medication SCH EACH: at 12:40

## 2020-01-05 RX ADMIN — APIXABAN SCH MG: 5 TABLET, FILM COATED ORAL at 10:59

## 2020-01-05 RX ADMIN — Medication SCH EA: at 10:53

## 2020-01-05 RX ADMIN — ACETAMINOPHEN SCH MG: 500 TABLET ORAL at 13:00

## 2020-01-05 RX ADMIN — Medication SCH TAB: at 10:52

## 2020-01-05 RX ADMIN — GABAPENTIN SCH MG: 300 CAPSULE ORAL at 10:51

## 2020-01-05 RX ADMIN — Medication SCH EA: at 19:29

## 2020-01-05 RX ADMIN — Medication PRN MG: at 10:11

## 2020-01-05 RX ADMIN — Medication SCH EA: at 00:00

## 2020-01-05 RX ADMIN — TRAZODONE HYDROCHLORIDE SCH MG: 50 TABLET ORAL at 22:13

## 2020-01-05 RX ADMIN — PANTOPRAZOLE SODIUM SCH MG: 40 TABLET, DELAYED RELEASE ORAL at 08:34

## 2020-01-05 RX ADMIN — Medication SCH MG: at 11:09

## 2020-01-05 RX ADMIN — Medication SCH EA: at 08:33

## 2020-01-05 RX ADMIN — MAGNESIUM OXIDE TAB 400 MG (241.3 MG ELEMENTAL MG) SCH MG: 400 (241.3 MG) TAB at 10:54

## 2020-01-05 RX ADMIN — GABAPENTIN SCH MG: 400 CAPSULE ORAL at 22:13

## 2020-01-05 RX ADMIN — CARVEDILOL SCH MG: 6.25 TABLET, FILM COATED ORAL at 10:56

## 2020-01-05 RX ADMIN — ACETAMINOPHEN SCH MG: 500 TABLET ORAL at 20:58

## 2020-01-05 RX ADMIN — Medication SCH MG: at 11:01

## 2020-01-05 RX ADMIN — CARVEDILOL SCH MG: 6.25 TABLET, FILM COATED ORAL at 20:31

## 2020-01-05 RX ADMIN — Medication SCH MG: at 15:49

## 2020-01-05 RX ADMIN — CALCIUM SCH MG: 500 TABLET ORAL at 09:00

## 2020-01-05 RX ADMIN — GABAPENTIN SCH MG: 300 CAPSULE ORAL at 17:30

## 2020-01-05 RX ADMIN — FUROSEMIDE SCH MG: 40 TABLET ORAL at 11:40

## 2020-01-05 RX ADMIN — Medication SCH MG: at 03:07

## 2020-01-05 RX ADMIN — ALBUTEROL SULFATE SCH MG: 2.5 SOLUTION RESPIRATORY (INHALATION) at 07:29

## 2020-01-05 RX ADMIN — Medication SCH EACH: at 07:32

## 2020-01-05 RX ADMIN — INSULIN HUMAN PRN UNIT: 100 INJECTION, SOLUTION PARENTERAL at 12:59

## 2020-01-05 RX ADMIN — POLYETHYLENE GLYCOL 3350 SCH GM: 17 POWDER, FOR SOLUTION ORAL at 09:00

## 2020-01-05 RX ADMIN — VENLAFAXINE HYDROCHLORIDE SCH MG: 150 CAPSULE, EXTENDED RELEASE ORAL at 11:01

## 2020-01-05 RX ADMIN — Medication SCH MG: at 23:26

## 2020-01-05 RX ADMIN — INSULIN HUMAN PRN UNIT: 100 INJECTION, SOLUTION PARENTERAL at 17:44

## 2020-01-05 RX ADMIN — ALBUTEROL SULFATE SCH MG: 2.5 SOLUTION RESPIRATORY (INHALATION) at 23:26

## 2020-01-05 RX ADMIN — EZETIMIBE SCH MG: 10 TABLET ORAL at 17:30

## 2020-01-05 RX ADMIN — ACETAMINOPHEN SCH MG: 500 TABLET ORAL at 11:40

## 2020-01-05 RX ADMIN — Medication SCH EACH: at 22:12

## 2020-01-05 RX ADMIN — CALCIUM SCH MG: 500 TABLET ORAL at 10:52

## 2020-01-05 RX ADMIN — Medication SCH EACH: at 17:41

## 2020-01-05 RX ADMIN — DILTIAZEM HYDROCHLORIDE SCH MG: 240 CAPSULE, EXTENDED RELEASE ORAL at 11:40

## 2020-01-05 RX ADMIN — Medication SCH TAB: at 10:51

## 2020-01-05 RX ADMIN — Medication SCH MG: at 07:29

## 2020-01-05 NOTE — NUR
ms/rn notes

patient reported tylenol not working for her pain that she had home medication earlier which 
is effective.

## 2020-01-05 NOTE — NUR
TELE RN NOTES

CALLED DR STALEY FOR FUROSEMIDE CLARIFICATION AND PER DR STALEY CONTINUE FUROSEMIDE PO 
40 MG DAILY.

## 2020-01-05 NOTE — NUR
MS RN NOTES

ACCORDING TO PATIENT`S DAUGHTER PATIENT TAKES FUROSEMIDE PO 80MG ON MONDAYS, WEDNESDAYS AND 
FRIDAYS.AND FUROSEMIDE 40 MG IS TAKEN ON TUESDAYS, THURSDAYS SATURDAYS AND SUNDAYS.

## 2020-01-05 NOTE — NUR
MS RN NOTES

PATIENT STATED THAT SHE DOES NOT WANT TO TAKE THE VITAMINS. SUPPLEMENTS DISCARDED IN WASTE 
BIN.

## 2020-01-05 NOTE — NUR
TELE RN NOTES

PATIENT IN BED A/OX4 WITH AFIB 89. NO SOB OR DISTRESS NOTED AT THIS TIME. CALL LIGHT WITHIN 
REACH. BED AT THE LOWEST POSITION LOCKED. WILL CONTINUE TO MONITOR.

## 2020-01-05 NOTE — NUR
RN CLOSING NOTE

PT IN BED SLEEPING COMFORTABLY WITH HEAD OF BED SLIGHTLY ELEVATED. CURRENTLY ON 3L OF O2 VIA 
NC. NO SOB NOTED. RESPIRATIONS EVEN AND UNLABORED. NO INDICATIONS OF PAIN OR DISCOMFORT. ON 
TELE MONITOR. SAFETY MEASURES IN PLACE. CALL LIGHT WITHIN REACH. ENDORSED TO MORNING SHIFT.

## 2020-01-05 NOTE — NUR
MS/RN OPENING NOTES

RECEIVED PATIENT SITTING IN THE BRIDGETTE, AWAKE, ALERT X3, ABLE TO VERBALIZE NEED, MEDICATION 
GIVEN HOME MED BY AM RN AFTER DINNER REQUESTED BY PATIENT, VITAL SIGNS CHECK BY CNA,. 
PATIENT BELONGINGS WITHIN REACH, BED LOCKED CALL LIGHTS WITHIN REACH, INSTRUCTED TO CALL FOR 
ASSISTANCE, OBSERVE SOME REDNESS AND WOUND SCAB, AS PATIENT SCRATCHES LEGS. WILL MONITOR 
HYPO/HYPER GLYCEMIAM RIGHT UPPER MIDLINE IVY 18 PATIENT, ON USE OF OXYGEN AS NEEDEDM TO 
FOLLOW UP ANY HOME MEDICATION OF CONCERN PER DAUGHTER IN AM .

## 2020-01-05 NOTE — NUR
MS RN NOTES

PATIENT IS SITTING IN BED WITHOUT NASAL CANNULA THE SATURATION IS 91%. NON COMPLIANT WITH 
DIET. NO SOB OR DISCOMFORT NOTED. ALL NEEDS ATTENDANT , MEDICATIONS GIVEN. BED AT THE LOWEST 
POSITION LOCKED. CALL LIGHT WITHIN REACH . ENDORSED TO NIGHT SHIFT NURSE FOR PIPPA.

## 2020-01-05 NOTE — NUR
RN NOTE

PT REFUSED TO TAKE NALOXONE AT THIS TIME. EXPLAINED RISKS AND ADVANTAGES BUT PT CONTINUES TO 
REFUSE.

## 2020-01-05 NOTE — NUR
ICU/RN: PT BROUGHT INTO CT FOR REPEAT CTA. LEFT UPPER ARM MIDLINE IN PLACE, GOOD BLOOD 
RETURN NOTED. POST ONE DOSE OF METOPROLOL PT WAS HYPOTENSIVE, /57, HR 82, A.FIB ON 
TELE. UNABLE TO PROCEED WITH FURTHER DOSES OF METOPROLOL AND NITRO. WILL PROCEED WITH EXAM.

## 2020-01-05 NOTE — NUR
RN NOTE

OFFERED PT NALOXONE TO BE GIVEN AT 0600 AS ORDERED. PT REFUSED AND STATES THAT SHE WANTS TO 
TAKE IT AFTER SHE EATS BREAKFAST. WILL ENDORSE TO MORNING SHIFT.

## 2020-01-05 NOTE — NUR
ICU/RN: POST PROCEDURE VITALS, BP 98/65, HR 71, O2 95%, RR 18. ON 2LITERS NASAL CANULA. 
ADMINISTERED 5MG METOPROLOL AND NO NITROGLYCERINE. EXAM WAS COMPLETED AND MD NOTIFIED. WILL 
ENDORSE REPORT TO PRIMARY RN.

## 2020-01-06 VITALS — SYSTOLIC BLOOD PRESSURE: 130 MMHG | DIASTOLIC BLOOD PRESSURE: 59 MMHG

## 2020-01-06 VITALS — SYSTOLIC BLOOD PRESSURE: 108 MMHG | DIASTOLIC BLOOD PRESSURE: 57 MMHG

## 2020-01-06 VITALS — DIASTOLIC BLOOD PRESSURE: 59 MMHG | SYSTOLIC BLOOD PRESSURE: 98 MMHG

## 2020-01-06 LAB
BASOPHILS # BLD AUTO: 0 /CMM (ref 0–0.2)
BASOPHILS NFR BLD AUTO: 0.2 % (ref 0–2)
BUN SERPL-MCNC: 38 MG/DL (ref 7–18)
CALCIUM SERPL-MCNC: 9 MG/DL (ref 8.5–10.1)
CHLORIDE SERPL-SCNC: 101 MMOL/L (ref 98–107)
CO2 SERPL-SCNC: 30 MMOL/L (ref 21–32)
CREAT SERPL-MCNC: 1.3 MG/DL (ref 0.6–1.3)
EOSINOPHIL NFR BLD AUTO: 0 % (ref 0–6)
GLUCOSE SERPL-MCNC: 153 MG/DL (ref 74–106)
HCT VFR BLD AUTO: 35 % (ref 33–45)
HGB BLD-MCNC: 11.1 G/DL (ref 11.5–14.8)
LYMPHOCYTES NFR BLD AUTO: 1.6 /CMM (ref 0.8–4.8)
LYMPHOCYTES NFR BLD AUTO: 10.9 % (ref 20–44)
LYMPHOCYTES NFR BLD MANUAL: 23 % (ref 16–48)
MCHC RBC AUTO-ENTMCNC: 32 G/DL (ref 31–36)
MCV RBC AUTO: 94 FL (ref 82–100)
MONOCYTES NFR BLD AUTO: 0.8 /CMM (ref 0.1–1.3)
MONOCYTES NFR BLD AUTO: 5.5 % (ref 2–12)
MONOCYTES NFR BLD MANUAL: 5 % (ref 0–11)
NEUTROPHILS # BLD AUTO: 12.1 /CMM (ref 1.8–8.9)
NEUTROPHILS NFR BLD AUTO: 83.4 % (ref 43–81)
NEUTS BAND NFR BLD MANUAL: 1 % (ref 0–5)
NEUTS SEG NFR BLD MANUAL: 71 % (ref 42–76)
PLATELET # BLD AUTO: 347 /CMM (ref 150–450)
POTASSIUM SERPL-SCNC: 4.9 MMOL/L (ref 3.5–5.1)
RBC # BLD AUTO: 3.7 MIL/UL (ref 4–5.2)
SODIUM SERPL-SCNC: 140 MMOL/L (ref 136–145)
WBC NRBC COR # BLD AUTO: 14.6 K/UL (ref 4.3–11)

## 2020-01-06 RX ADMIN — Medication SCH MG: at 09:00

## 2020-01-06 RX ADMIN — ALBUTEROL SULFATE SCH MG: 2.5 SOLUTION RESPIRATORY (INHALATION) at 11:50

## 2020-01-06 RX ADMIN — CALCIUM SCH MG: 500 TABLET ORAL at 10:09

## 2020-01-06 RX ADMIN — Medication SCH MG: at 07:48

## 2020-01-06 RX ADMIN — VENLAFAXINE HYDROCHLORIDE SCH MG: 150 CAPSULE, EXTENDED RELEASE ORAL at 10:10

## 2020-01-06 RX ADMIN — Medication SCH MG: at 11:50

## 2020-01-06 RX ADMIN — Medication SCH EA: at 13:29

## 2020-01-06 RX ADMIN — Medication SCH EA: at 09:00

## 2020-01-06 RX ADMIN — ALBUTEROL SULFATE SCH MG: 2.5 SOLUTION RESPIRATORY (INHALATION) at 03:39

## 2020-01-06 RX ADMIN — ALBUTEROL SULFATE SCH MG: 2.5 SOLUTION RESPIRATORY (INHALATION) at 15:30

## 2020-01-06 RX ADMIN — DILTIAZEM HYDROCHLORIDE SCH MG: 240 CAPSULE, EXTENDED RELEASE ORAL at 09:00

## 2020-01-06 RX ADMIN — Medication SCH EACH: at 10:26

## 2020-01-06 RX ADMIN — MAGNESIUM OXIDE TAB 400 MG (241.3 MG ELEMENTAL MG) SCH MG: 400 (241.3 MG) TAB at 09:00

## 2020-01-06 RX ADMIN — Medication SCH MG: at 15:30

## 2020-01-06 RX ADMIN — HUMAN INSULIN PRN UNIT: 100 INJECTION, SOLUTION SUBCUTANEOUS at 10:24

## 2020-01-06 RX ADMIN — Medication SCH TAB: at 09:00

## 2020-01-06 RX ADMIN — PANTOPRAZOLE SODIUM SCH MG: 40 TABLET, DELAYED RELEASE ORAL at 10:08

## 2020-01-06 RX ADMIN — Medication SCH MG: at 03:39

## 2020-01-06 RX ADMIN — Medication SCH EACH: at 13:26

## 2020-01-06 RX ADMIN — CARVEDILOL SCH MG: 6.25 TABLET, FILM COATED ORAL at 10:38

## 2020-01-06 RX ADMIN — Medication SCH EA: at 06:00

## 2020-01-06 RX ADMIN — ACETAMINOPHEN SCH MG: 500 TABLET ORAL at 10:08

## 2020-01-06 RX ADMIN — ALBUTEROL SULFATE SCH MG: 2.5 SOLUTION RESPIRATORY (INHALATION) at 07:48

## 2020-01-06 RX ADMIN — Medication SCH EA: at 00:00

## 2020-01-06 RX ADMIN — HUMAN INSULIN PRN UNIT: 100 INJECTION, SOLUTION SUBCUTANEOUS at 13:36

## 2020-01-06 RX ADMIN — GABAPENTIN SCH MG: 300 CAPSULE ORAL at 10:09

## 2020-01-06 RX ADMIN — ACETAMINOPHEN SCH MG: 500 TABLET ORAL at 13:00

## 2020-01-06 RX ADMIN — POLYETHYLENE GLYCOL 3350 SCH GM: 17 POWDER, FOR SOLUTION ORAL at 09:00

## 2020-01-06 RX ADMIN — APIXABAN SCH MG: 5 TABLET, FILM COATED ORAL at 10:39

## 2020-01-06 NOTE — NUR
106-1

MS/RN NOTES PATIENT ASLEEP.REFUSE TO BE AWAKEN AT THIS TIME, NOT GIVEN HOME MEDICATION FOR 
PAIN AS PATIENT WITH NO GRIMACE AND NO GUARDING, COMFORTABLE. NO SKIN ISSUES BUT ONLY OLD 
SCAB AND REDNESS AS PATIENT EASILY SCRATCHES LEGS FREQUENTLY.

## 2020-01-06 NOTE — NUR
RN TELE NOTES 





PATIENT A/O X4 . PATIENT IS ON ROOM AIR. NO SOB. EVEN AND UNLABORED BREATHING . NO PAIN . NO 
ACUTE RESPIRATORY DISTRESS. ALL DISCHARGE PAPERS SIGNED. MEDICATIONS GIVEN. ALL BELONGEINGS 
GIVEN AND MEDICATION FROM PHARMACY GIVEN TO DAUGHTER. PATIENT WHEEL CHAIRED TO THE CAR AND 
ARRIVED SAFETY.

## 2020-01-06 NOTE — NUR
RN MS NOTES 1 - OPENING. 





PATIENT  IS A/O X4 PATIENT IS ASLEEP BUT EASILY WOKEN WITH NAME AND TOUCH . PATIENT IS IN 2 
L OF OXYGEN . PATIENT IS SATURATING WELL. NO SON. NO PAIN. EVEN AND UNLABORED BREATHING . 
PATIENT HAS BLE REDNESS AND SACBS . PATIENT HAS SHREYA MIDLNE 18 - R AC 20# . INTACT AND 
PATENT. C/V/I DRESSING.  BED LOCKED AND LOWEST POSITION CALL LIGHT WITH IN REACH . ALL 
SAFETY MEASURE IMPLEMENTED PER HOSPITAL POLICY.

## 2020-01-06 NOTE — NUR
106-1

MS/RN CLOSING NOTES

PATIENT ABLE TO SLEEP DURING THE NIGHTS, SLEPT WELL,RESPIRATIONS EVEN AND UNLABORED, ABLE TO 
VERBALIZE NDEEDS, ON OXYGEN AT 2LITER FOR COMFORT MEASURES, EDUCATED ON DIET AND DISEASE 
PROCESS, MOTIVATED TO LEARN BUT ENCOURAGE TO ASK FOR ASSISTANCE FOR SAFETY. BED LOCKED, CALL 
LIGHTS WITHIN REACH. VITAL SIGNS CHECK, MONTIORED FOR HYPO/HYPERGLYCEMIA. SKIN INTACT WITH 
SOME SCANS AND REDNESS ON LEGS.NO OPEN WOUND. WILL ENDORSE TO AM RN FOR PIPPA. BED LOCKED, 
CALL LIGHTS WITHIN REACH

## 2020-11-02 ENCOUNTER — HOSPITAL ENCOUNTER (EMERGENCY)
Dept: HOSPITAL 54 - ER | Age: 75
Discharge: HOME | End: 2020-11-02
Payer: MEDICARE

## 2020-11-02 VITALS — WEIGHT: 230 LBS | HEIGHT: 65 IN | BODY MASS INDEX: 38.32 KG/M2

## 2020-11-02 VITALS — DIASTOLIC BLOOD PRESSURE: 85 MMHG | SYSTOLIC BLOOD PRESSURE: 123 MMHG

## 2020-11-02 DIAGNOSIS — F17.200: ICD-10-CM

## 2020-11-02 DIAGNOSIS — F32.9: ICD-10-CM

## 2020-11-02 DIAGNOSIS — Z79.899: ICD-10-CM

## 2020-11-02 DIAGNOSIS — Z98.890: ICD-10-CM

## 2020-11-02 DIAGNOSIS — M79.7: ICD-10-CM

## 2020-11-02 DIAGNOSIS — E78.5: ICD-10-CM

## 2020-11-02 DIAGNOSIS — J44.9: ICD-10-CM

## 2020-11-02 DIAGNOSIS — M25.551: ICD-10-CM

## 2020-11-02 DIAGNOSIS — I10: ICD-10-CM

## 2020-11-02 DIAGNOSIS — F41.9: ICD-10-CM

## 2020-11-02 DIAGNOSIS — E11.40: ICD-10-CM

## 2020-11-02 DIAGNOSIS — M19.90: ICD-10-CM

## 2020-11-02 DIAGNOSIS — Z90.89: ICD-10-CM

## 2020-11-02 DIAGNOSIS — F03.90: ICD-10-CM

## 2020-11-02 DIAGNOSIS — M25.552: Primary | ICD-10-CM

## 2021-06-27 ENCOUNTER — HOSPITAL ENCOUNTER (INPATIENT)
Dept: HOSPITAL 54 - ER | Age: 76
LOS: 3 days | Discharge: HOME | DRG: 73 | End: 2021-06-30
Attending: INTERNAL MEDICINE | Admitting: NURSE PRACTITIONER
Payer: MEDICARE

## 2021-06-27 VITALS — BODY MASS INDEX: 37.7 KG/M2 | HEIGHT: 65 IN | WEIGHT: 226.25 LBS

## 2021-06-27 VITALS — DIASTOLIC BLOOD PRESSURE: 69 MMHG | SYSTOLIC BLOOD PRESSURE: 119 MMHG

## 2021-06-27 VITALS — SYSTOLIC BLOOD PRESSURE: 136 MMHG | DIASTOLIC BLOOD PRESSURE: 52 MMHG

## 2021-06-27 DIAGNOSIS — Z79.899: ICD-10-CM

## 2021-06-27 DIAGNOSIS — N18.9: ICD-10-CM

## 2021-06-27 DIAGNOSIS — G93.41: ICD-10-CM

## 2021-06-27 DIAGNOSIS — I25.10: ICD-10-CM

## 2021-06-27 DIAGNOSIS — J44.9: ICD-10-CM

## 2021-06-27 DIAGNOSIS — R29.6: ICD-10-CM

## 2021-06-27 DIAGNOSIS — M10.9: ICD-10-CM

## 2021-06-27 DIAGNOSIS — Z90.49: ICD-10-CM
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
## 2021-06-27 DIAGNOSIS — T50.7X5A: ICD-10-CM

## 2021-06-27 DIAGNOSIS — G47.00: ICD-10-CM

## 2021-06-27 DIAGNOSIS — D68.59: ICD-10-CM

## 2021-06-27 DIAGNOSIS — Z98.1: ICD-10-CM

## 2021-06-27 DIAGNOSIS — F41.9: ICD-10-CM

## 2021-06-27 DIAGNOSIS — M79.7: ICD-10-CM

## 2021-06-27 DIAGNOSIS — N39.0: ICD-10-CM

## 2021-06-27 DIAGNOSIS — M62.82: ICD-10-CM

## 2021-06-27 DIAGNOSIS — Z79.01: ICD-10-CM

## 2021-06-27 DIAGNOSIS — Z20.822: ICD-10-CM

## 2021-06-27 DIAGNOSIS — K83.8: ICD-10-CM

## 2021-06-27 DIAGNOSIS — F03.90: ICD-10-CM

## 2021-06-27 DIAGNOSIS — G90.8: Primary | ICD-10-CM

## 2021-06-27 DIAGNOSIS — E87.6: ICD-10-CM

## 2021-06-27 DIAGNOSIS — F19.90: ICD-10-CM

## 2021-06-27 DIAGNOSIS — A04.72: ICD-10-CM

## 2021-06-27 DIAGNOSIS — E88.09: ICD-10-CM

## 2021-06-27 DIAGNOSIS — T42.6X5A: ICD-10-CM

## 2021-06-27 DIAGNOSIS — B96.1: ICD-10-CM

## 2021-06-27 DIAGNOSIS — E61.1: ICD-10-CM

## 2021-06-27 DIAGNOSIS — J98.11: ICD-10-CM

## 2021-06-27 DIAGNOSIS — M75.101: ICD-10-CM

## 2021-06-27 DIAGNOSIS — E11.22: ICD-10-CM

## 2021-06-27 DIAGNOSIS — E11.40: ICD-10-CM

## 2021-06-27 DIAGNOSIS — Z90.710: ICD-10-CM

## 2021-06-27 DIAGNOSIS — I50.32: ICD-10-CM

## 2021-06-27 DIAGNOSIS — G47.30: ICD-10-CM

## 2021-06-27 DIAGNOSIS — E78.00: ICD-10-CM

## 2021-06-27 DIAGNOSIS — T42.4X5A: ICD-10-CM

## 2021-06-27 DIAGNOSIS — E66.01: ICD-10-CM

## 2021-06-27 DIAGNOSIS — I48.91: ICD-10-CM

## 2021-06-27 DIAGNOSIS — F32.9: ICD-10-CM

## 2021-06-27 DIAGNOSIS — I13.0: ICD-10-CM

## 2021-06-27 DIAGNOSIS — Z72.0: ICD-10-CM

## 2021-06-27 DIAGNOSIS — Z87.19: ICD-10-CM

## 2021-06-27 DIAGNOSIS — E11.51: ICD-10-CM
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
## 2021-06-27 DIAGNOSIS — F11.90: ICD-10-CM

## 2021-06-27 DIAGNOSIS — M19.90: ICD-10-CM

## 2021-06-27 DIAGNOSIS — E78.5: ICD-10-CM

## 2021-06-27 LAB
ALBUMIN SERPL BCP-MCNC: 2.4 G/DL (ref 3.4–5)
ALP SERPL-CCNC: 104 U/L (ref 46–116)
ALT SERPL W P-5'-P-CCNC: 28 U/L (ref 12–78)
AST SERPL W P-5'-P-CCNC: 63 U/L (ref 15–37)
BASOPHILS # BLD AUTO: 0.2 K/UL (ref 0–0.2)
BASOPHILS NFR BLD AUTO: 1.3 % (ref 0–2)
BILIRUB DIRECT SERPL-MCNC: 0.1 MG/DL (ref 0–0.2)
BILIRUB SERPL-MCNC: 0.3 MG/DL (ref 0.2–1)
BILIRUB UR QL STRIP: NEGATIVE
BUN SERPL-MCNC: 14 MG/DL (ref 7–18)
CALCIUM SERPL-MCNC: 9.1 MG/DL (ref 8.5–10.1)
CHLORIDE SERPL-SCNC: 103 MMOL/L (ref 98–107)
CK SERPL-CCNC: 1417 U/L (ref 26–192)
CO2 SERPL-SCNC: 34 MMOL/L (ref 21–32)
COLOR UR: YELLOW
CREAT SERPL-MCNC: 1.2 MG/DL (ref 0.6–1.3)
DEPRECATED SQUAMOUS URNS QL MICRO: (no result) /HPF
EOSINOPHIL NFR BLD AUTO: 0.2 % (ref 0–6)
GLUCOSE SERPL-MCNC: 120 MG/DL (ref 74–106)
GLUCOSE UR STRIP-MCNC: (no result) MG/DL
HCT VFR BLD AUTO: 30 % (ref 33–45)
HGB BLD-MCNC: 9.2 G/DL (ref 11.5–14.8)
LEUKOCYTE ESTERASE UR QL STRIP: (no result)
LYMPHOCYTES NFR BLD AUTO: 1.5 K/UL (ref 0.8–4.8)
LYMPHOCYTES NFR BLD AUTO: 12.1 % (ref 20–44)
MCHC RBC AUTO-ENTMCNC: 31 G/DL (ref 31–36)
MCV RBC AUTO: 95 FL (ref 82–100)
MONOCYTES NFR BLD AUTO: 0.8 K/UL (ref 0.1–1.3)
MONOCYTES NFR BLD AUTO: 6.4 % (ref 2–12)
NEUTROPHILS # BLD AUTO: 10.2 K/UL (ref 1.8–8.9)
NEUTROPHILS NFR BLD AUTO: 80 % (ref 43–81)
NITRITE UR QL STRIP: POSITIVE
PH UR STRIP: 6 [PH] (ref 5–8)
PLATELET # BLD AUTO: 358 K/UL (ref 150–450)
POTASSIUM SERPL-SCNC: 3.6 MMOL/L (ref 3.5–5.1)
PROT SERPL-MCNC: 6.4 G/DL (ref 6.4–8.2)
PROT UR QL STRIP: 100 MG/DL
RBC # BLD AUTO: 3.12 MIL/UL (ref 4–5.2)
RBC #/AREA URNS HPF: (no result) /HPF (ref 0–2)
SODIUM SERPL-SCNC: 143 MMOL/L (ref 136–145)
UROBILINOGEN UR STRIP-MCNC: 0.2 EU/DL
WBC #/AREA URNS HPF: (no result) /HPF
WBC #/AREA URNS HPF: (no result) /HPF (ref 0–3)
WBC NRBC COR # BLD AUTO: 12.8 K/UL (ref 4.3–11)

## 2021-06-27 PROCEDURE — 05HB33Z INSERTION OF INFUSION DEVICE INTO RIGHT BASILIC VEIN, PERCUTANEOUS APPROACH: ICD-10-PCS | Performed by: NURSE PRACTITIONER

## 2021-06-27 PROCEDURE — G0378 HOSPITAL OBSERVATION PER HR: HCPCS

## 2021-06-27 PROCEDURE — C9803 HOPD COVID-19 SPEC COLLECT: HCPCS

## 2021-06-27 PROCEDURE — C9113 INJ PANTOPRAZOLE SODIUM, VIA: HCPCS

## 2021-06-27 RX ADMIN — Medication SCH EACH: at 22:20

## 2021-06-27 RX ADMIN — ACETAMINOPHEN PRN MG: 325 TABLET ORAL at 19:58

## 2021-06-27 RX ADMIN — ATORVASTATIN CALCIUM SCH MG: 40 TABLET, FILM COATED ORAL at 21:35

## 2021-06-27 RX ADMIN — TRAZODONE HYDROCHLORIDE SCH MG: 50 TABLET ORAL at 21:34

## 2021-06-27 RX ADMIN — EZETIMIBE SCH MG: 10 TABLET ORAL at 21:35

## 2021-06-27 RX ADMIN — Medication SCH EACH: at 17:30

## 2021-06-27 NOTE — NUR
PATIENT ASSISTED TO THE COMMODE. PATIENT ABLE TO STAND AND TRANSFER HERSELF 
FROM BED TO THE COMMODE WITH MINIMUM ASSIST.

## 2021-06-27 NOTE — NUR
RN CLOSING NOTE



PT AWAKE IN BED RESTING. A/O X2 AND ENGLISH SPEAKING. NO COMPLAINT OF PAIN OR NAUSEA 
PRESENT. CURRENTLY ON 2L NC O2 WITH NO SOB OR RESPIRATORY DISTRESS PRESENT. NO CARDIAC 
MONITOR PRESENT. NO EDEMA PRESENT. MULTIPLE BRUISES PRESENT. IV PRESENT ON L AC 20 G AND 
FLUSHES WELL. MIDLINE PRESENT ON AMAYA AND FLUSHES WELL. LABS AND ORDERS REVIEWED. SAFETY 
MEASURES IN PLACE. SIDE RAILS RAISED. BED LOWERED. CALL LIGHT WITHIN REACH. REPORT TO BE 
GIVEN TO NIGHT NURSE FOR PIPPA.
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
## 2021-06-27 NOTE — NUR
RN NOTE



PT IN ROOM 326. NO RESPIRATORY DISTRESS NOTED. V/S TAKEN UPON ADMISSION. WOUND PICTURES 
TAKEN AND PLACED IN CHART. LABS AND ORDERS REVIEWED. WILL CONTINUE TO MONITOR.

## 2021-06-27 NOTE — NUR
MS OPENING NOTES 



PATIENT ALERT/ORIENTED X 2, CONFUSED AT TIMES AND VERY RESTLESS, SITTER PRESENT AT BEDSIDE. 
PATIENT REPORTING MILD PAIN IN BACK, TYLENOL GIVEN AS ORDERED. RIGHT UPPER ARM MIDLINE 
INTACT AND RUNNING BUMEX @ 10 ML/HR, RIGHT AC IV ACCESS PULLED OUT BY PATIENT, NO EXCESS 
BLEEDING NOTED. PATIENT HAS MULTIPLE BRUISES IN BILATERAL ARMS AND BACK. PATIENT STABLE ON 
2L OF OXYGEN VIA NASAL CANNULA, NO S/S OF DISTRESS OR SHORTNESS OF BREATH NOTED. SAFETY 
MEASURES IN PLACE, CALL LIGHT AND BEDSIDE TABLE WITHIN REACH, BED LOCKED IN LOWEST POSITION, 
SIDE RAILS UP X 3, BED ALARM ON. WILL CONTINUE TO MONITOR

## 2021-06-28 VITALS — SYSTOLIC BLOOD PRESSURE: 107 MMHG | DIASTOLIC BLOOD PRESSURE: 73 MMHG

## 2021-06-28 VITALS — SYSTOLIC BLOOD PRESSURE: 119 MMHG | DIASTOLIC BLOOD PRESSURE: 69 MMHG

## 2021-06-28 VITALS — DIASTOLIC BLOOD PRESSURE: 65 MMHG | SYSTOLIC BLOOD PRESSURE: 113 MMHG

## 2021-06-28 LAB
ALBUMIN SERPL BCP-MCNC: 2.3 G/DL (ref 3.4–5)
ALP SERPL-CCNC: 95 U/L (ref 46–116)
ALT SERPL W P-5'-P-CCNC: 29 U/L (ref 12–78)
AST SERPL W P-5'-P-CCNC: 56 U/L (ref 15–37)
BASOPHILS # BLD AUTO: 0 K/UL (ref 0–0.2)
BASOPHILS NFR BLD AUTO: 0.4 % (ref 0–2)
BILIRUB SERPL-MCNC: 0.3 MG/DL (ref 0.2–1)
BUN SERPL-MCNC: 13 MG/DL (ref 7–18)
CALCIUM SERPL-MCNC: 8.4 MG/DL (ref 8.5–10.1)
CHLORIDE SERPL-SCNC: 103 MMOL/L (ref 98–107)
CHOLEST SERPL-MCNC: 111 MG/DL (ref ?–200)
CO2 SERPL-SCNC: 35 MMOL/L (ref 21–32)
CREAT SERPL-MCNC: 1.2 MG/DL (ref 0.6–1.3)
EOSINOPHIL NFR BLD AUTO: 2.1 % (ref 0–6)
GLUCOSE SERPL-MCNC: 118 MG/DL (ref 74–106)
HCT VFR BLD AUTO: 27 % (ref 33–45)
HDLC SERPL-MCNC: 45 MG/DL (ref 40–60)
HGB BLD-MCNC: 8.8 G/DL (ref 11.5–14.8)
IRON SERPL-MCNC: 17 UG/DL (ref 50–175)
LDLC SERPL DIRECT ASSAY-MCNC: 47 MG/DL (ref 0–99)
LYMPHOCYTES NFR BLD AUTO: 1.5 K/UL (ref 0.8–4.8)
LYMPHOCYTES NFR BLD AUTO: 15.4 % (ref 20–44)
MAGNESIUM SERPL-MCNC: 2.1 MG/DL (ref 1.8–2.4)
MCHC RBC AUTO-ENTMCNC: 32 G/DL (ref 31–36)
MCV RBC AUTO: 94 FL (ref 82–100)
MONOCYTES NFR BLD AUTO: 0.7 K/UL (ref 0.1–1.3)
MONOCYTES NFR BLD AUTO: 7.3 % (ref 2–12)
NEUTROPHILS # BLD AUTO: 7.1 K/UL (ref 1.8–8.9)
NEUTROPHILS NFR BLD AUTO: 74.8 % (ref 43–81)
PHOSPHATE SERPL-MCNC: 3.9 MG/DL (ref 2.5–4.9)
PLATELET # BLD AUTO: 357 K/UL (ref 150–450)
POTASSIUM SERPL-SCNC: 3 MMOL/L (ref 3.5–5.1)
PROT SERPL-MCNC: 6 G/DL (ref 6.4–8.2)
RBC # BLD AUTO: 2.91 MIL/UL (ref 4–5.2)
SODIUM SERPL-SCNC: 143 MMOL/L (ref 136–145)
TIBC SERPL-MCNC: 182 UG/DL (ref 250–450)
TRIGL SERPL-MCNC: 105 MG/DL (ref 30–150)
TSH SERPL DL<=0.005 MIU/L-ACNC: 3.97 UIU/ML (ref 0.36–3.74)
WBC NRBC COR # BLD AUTO: 9.6 K/UL (ref 4.3–11)

## 2021-06-28 RX ADMIN — FERROUS SULFATE TAB 325 MG (65 MG ELEMENTAL FE) SCH MG: 325 (65 FE) TAB at 08:51

## 2021-06-28 RX ADMIN — SODIUM CHLORIDE SCH MG: 9 INJECTION, SOLUTION INTRAVENOUS at 08:51

## 2021-06-28 RX ADMIN — Medication SCH EA: at 14:58

## 2021-06-28 RX ADMIN — Medication SCH EACH: at 17:21

## 2021-06-28 RX ADMIN — Medication SCH MG: at 23:30

## 2021-06-28 RX ADMIN — Medication SCH MG: at 11:52

## 2021-06-28 RX ADMIN — POTASSIUM CHLORIDE SCH MEQ: 1500 TABLET, EXTENDED RELEASE ORAL at 13:54

## 2021-06-28 RX ADMIN — ALLOPURINOL SCH MG: 100 TABLET ORAL at 08:52

## 2021-06-28 RX ADMIN — POTASSIUM CHLORIDE SCH MEQ: 1500 TABLET, EXTENDED RELEASE ORAL at 12:21

## 2021-06-28 RX ADMIN — GABAPENTIN SCH MG: 300 CAPSULE ORAL at 08:52

## 2021-06-28 RX ADMIN — APIXABAN SCH MG: 5 TABLET, FILM COATED ORAL at 17:23

## 2021-06-28 RX ADMIN — DEXTROSE MONOHYDRATE SCH MLS/HR: 50 INJECTION, SOLUTION INTRAVENOUS at 18:21

## 2021-06-28 RX ADMIN — Medication SCH EACH: at 07:35

## 2021-06-28 RX ADMIN — METFORMIN HYDROCHLORIDE SCH MG: 500 TABLET, FILM COATED ORAL at 17:22

## 2021-06-28 RX ADMIN — ALBUTEROL SULFATE SCH MG: 1.25 SOLUTION RESPIRATORY (INHALATION) at 11:52

## 2021-06-28 RX ADMIN — Medication SCH EACH: at 23:13

## 2021-06-28 RX ADMIN — POTASSIUM CHLORIDE SCH MEQ: 1500 TABLET, EXTENDED RELEASE ORAL at 11:07

## 2021-06-28 RX ADMIN — ATORVASTATIN CALCIUM SCH MG: 40 TABLET, FILM COATED ORAL at 23:12

## 2021-06-28 RX ADMIN — MONTELUKAST SODIUM SCH MG: 10 TABLET, FILM COATED ORAL at 08:52

## 2021-06-28 RX ADMIN — ALBUTEROL SULFATE SCH MG: 1.25 SOLUTION RESPIRATORY (INHALATION) at 20:30

## 2021-06-28 RX ADMIN — DEXTROSE MONOHYDRATE SCH MLS/HR: 50 INJECTION, SOLUTION INTRAVENOUS at 16:08

## 2021-06-28 RX ADMIN — VENLAFAXINE HYDROCHLORIDE SCH MG: 150 CAPSULE, EXTENDED RELEASE ORAL at 17:22

## 2021-06-28 RX ADMIN — Medication SCH EACH: at 11:28

## 2021-06-28 RX ADMIN — Medication SCH MG: at 20:29

## 2021-06-28 RX ADMIN — Medication SCH MG: at 15:34

## 2021-06-28 RX ADMIN — MAGNESIUM OXIDE TAB 400 MG (241.3 MG ELEMENTAL MG) SCH MG: 400 (241.3 MG) TAB at 08:52

## 2021-06-28 RX ADMIN — GABAPENTIN SCH MG: 300 CAPSULE ORAL at 12:21

## 2021-06-28 RX ADMIN — APIXABAN SCH MG: 5 TABLET, FILM COATED ORAL at 08:53

## 2021-06-28 RX ADMIN — TRAZODONE HYDROCHLORIDE SCH MG: 50 TABLET ORAL at 23:12

## 2021-06-28 RX ADMIN — VENLAFAXINE HYDROCHLORIDE SCH MG: 150 CAPSULE, EXTENDED RELEASE ORAL at 08:57

## 2021-06-28 RX ADMIN — EZETIMIBE SCH MG: 10 TABLET ORAL at 23:12

## 2021-06-28 RX ADMIN — DILTIAZEM HYDROCHLORIDE SCH MG: 240 CAPSULE, EXTENDED RELEASE ORAL at 08:58

## 2021-06-28 RX ADMIN — ALBUTEROL SULFATE SCH MG: 1.25 SOLUTION RESPIRATORY (INHALATION) at 15:34

## 2021-06-28 RX ADMIN — GABAPENTIN SCH MG: 300 CAPSULE ORAL at 17:22

## 2021-06-28 RX ADMIN — ACETAMINOPHEN PRN MG: 325 TABLET ORAL at 03:26

## 2021-06-28 RX ADMIN — METFORMIN HYDROCHLORIDE SCH MG: 500 TABLET, FILM COATED ORAL at 08:52

## 2021-06-28 RX ADMIN — ALBUTEROL SULFATE SCH MG: 1.25 SOLUTION RESPIRATORY (INHALATION) at 23:30

## 2021-06-28 NOTE — NUR
Patient is awake, A&Ox2. Denies pain or discomfort. No distress noted. Sitter at bedside for 
fall risk/ episodes of confusion. All other safety measures in place as well.

## 2021-06-28 NOTE — NUR
MS RN CLOSING NOTES



PATIENT REMAINS IN BED, ASLEEP, A/O X2. PATIENT ON OXYGEN THERAPY AT 2 LPM FOR COMFORT PER 
PATIENT REQUEST. NO COMPLAINS OF PAIN AT THIS TIME. SHREYA MIDLINE SL PRESENT AND INTACT. ALL 
NEEDS ATTENDED DURING THE DAY. SAFETY PRECAUTIONS IN PLACE; BED IN LOW POSITION AND LOCKED, 
RAILS UP X2, CALL LIGHT WITHIN REACH. WILL ENDORSE TO SARAI SHIFT NURSE.

## 2021-06-28 NOTE — NUR
MS RN CLOSING NOTES 



PATIENT RESTING IN BED, SITTER PRESENT AT BEDSIDE. PATIENT STABLE ON 2L OF OXYGEN VIA NASAL 
CANNULA, NO S/S OF DISTRESS OR SHORTNESS OF BREATH NOTED. MEDICATIONS GIVEN AS ORDERED, 
NEEDS MET THROUGHOUT SHIFT. STOOL SAMPLE FOR WBC AND CULTURES SENT TO LAB. SAFETY MEASURES 
IN PLACE, CALL LIGHT AND BEDSIDE TABLE WITHIN REACH, BED LOCKED IN LOWEST POSITION, SIDE 
RAILS UP X 3, BED ALARM ON. WILL ENDORSE TO DAY SHIFT NURSE FOR CONTINUITY OF CARE
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
## 2021-06-28 NOTE — NUR
MS TIFFANY NOTES



REQUESTED MEDICAL RECORDS FROM [facility] EARLIER TODAY BY FAXING AUTHORIZATION 
FORM TO [facility] MEDICAL RECORDS.

## 2021-06-28 NOTE — NUR
MS RN OPENING NOTES



RECEIVED PATIENT IN BED, AWAKE, A/O X2. PATIENT ON OXYGEN THERAPY AT 2 LPM FOR COMFORT PER 
PATIENT REQUEST. NO COMPLAINS OF PAIN AT THIS TIME. SHREYA MIDLINE SL PRESENT AND INTACT. 
SAFETY PRECAUTIONS IN PLACE; BED IN LOW POSITION AND LOCKED, RAILS UP X2, CALL LIGHT WITHIN 
REACH. WILL CONTINUE TO MONITOR PATIENT.
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
## 2021-06-29 VITALS — DIASTOLIC BLOOD PRESSURE: 53 MMHG | SYSTOLIC BLOOD PRESSURE: 98 MMHG

## 2021-06-29 VITALS — SYSTOLIC BLOOD PRESSURE: 117 MMHG | DIASTOLIC BLOOD PRESSURE: 71 MMHG

## 2021-06-29 LAB
BASE EXCESS BLDA CALC-SCNC: 4.9 MMOL/L
BASOPHILS # BLD AUTO: 0 K/UL (ref 0–0.2)
BASOPHILS NFR BLD AUTO: 0.6 % (ref 0–2)
BUN SERPL-MCNC: 10 MG/DL (ref 7–18)
CALCIUM SERPL-MCNC: 8.8 MG/DL (ref 8.5–10.1)
CHLORIDE SERPL-SCNC: 104 MMOL/L (ref 98–107)
CO2 SERPL-SCNC: 33 MMOL/L (ref 21–32)
CREAT SERPL-MCNC: 1.2 MG/DL (ref 0.6–1.3)
DO-HGB MFR BLDA: 30.8 MMHG
EOSINOPHIL NFR BLD AUTO: 4.2 % (ref 0–6)
FERRITIN SERPL-MCNC: 175 NG/ML (ref 8–388)
GLUCOSE SERPL-MCNC: 108 MG/DL (ref 74–106)
HCT VFR BLD AUTO: 26 % (ref 33–45)
HEMOCCULT STL QL: POSITIVE
HGB BLD-MCNC: 8.3 G/DL (ref 11.5–14.8)
INHALED O2 CONCENTRATION: 21 %
LYMPHOCYTES NFR BLD AUTO: 1.8 K/UL (ref 0.8–4.8)
LYMPHOCYTES NFR BLD AUTO: 24.8 % (ref 20–44)
MAGNESIUM SERPL-MCNC: 2.3 MG/DL (ref 1.8–2.4)
MCHC RBC AUTO-ENTMCNC: 32 G/DL (ref 31–36)
MCV RBC AUTO: 95 FL (ref 82–100)
MONOCYTES NFR BLD AUTO: 0.6 K/UL (ref 0.1–1.3)
MONOCYTES NFR BLD AUTO: 9.1 % (ref 2–12)
NEUTROPHILS # BLD AUTO: 4.4 K/UL (ref 1.8–8.9)
NEUTROPHILS NFR BLD AUTO: 61.3 % (ref 43–81)
PCO2 TEMP ADJ BLDA: 46.2 MMHG (ref 35–45)
PH TEMP ADJ BLDA: 7.43 [PH] (ref 7.35–7.45)
PLATELET # BLD AUTO: 343 K/UL (ref 150–450)
PO2 TEMP ADJ BLDA: 63.6 MMHG (ref 75–100)
POTASSIUM SERPL-SCNC: 3.4 MMOL/L (ref 3.5–5.1)
RBC # BLD AUTO: 2.73 MIL/UL (ref 4–5.2)
SAO2 % BLDA: 92.5 % (ref 92–98.5)
SODIUM SERPL-SCNC: 142 MMOL/L (ref 136–145)
VENTILATION MODE VENT: (no result)
WBC NRBC COR # BLD AUTO: 7.1 K/UL (ref 4.3–11)

## 2021-06-29 RX ADMIN — EZETIMIBE SCH MG: 10 TABLET ORAL at 21:22

## 2021-06-29 RX ADMIN — ALLOPURINOL SCH MG: 100 TABLET ORAL at 09:32

## 2021-06-29 RX ADMIN — METRONIDAZOLE SCH MG: 500 TABLET ORAL at 21:23

## 2021-06-29 RX ADMIN — ALBUTEROL SULFATE SCH MG: 1.25 SOLUTION RESPIRATORY (INHALATION) at 03:30

## 2021-06-29 RX ADMIN — Medication SCH EACH: at 21:54

## 2021-06-29 RX ADMIN — DEXTROSE MONOHYDRATE SCH MLS/HR: 50 INJECTION, SOLUTION INTRAVENOUS at 18:09

## 2021-06-29 RX ADMIN — Medication SCH EACH: at 17:22

## 2021-06-29 RX ADMIN — MONTELUKAST SODIUM SCH MG: 10 TABLET, FILM COATED ORAL at 09:30

## 2021-06-29 RX ADMIN — FERROUS SULFATE TAB 325 MG (65 MG ELEMENTAL FE) SCH MG: 325 (65 FE) TAB at 09:31

## 2021-06-29 RX ADMIN — ATORVASTATIN CALCIUM SCH MG: 40 TABLET, FILM COATED ORAL at 21:22

## 2021-06-29 RX ADMIN — Medication SCH MG: at 07:38

## 2021-06-29 RX ADMIN — ALBUTEROL SULFATE SCH MG: 1.25 SOLUTION RESPIRATORY (INHALATION) at 15:01

## 2021-06-29 RX ADMIN — SODIUM CHLORIDE SCH MG: 9 INJECTION, SOLUTION INTRAVENOUS at 09:32

## 2021-06-29 RX ADMIN — Medication SCH EACH: at 06:48

## 2021-06-29 RX ADMIN — ALBUTEROL SULFATE SCH MG: 1.25 SOLUTION RESPIRATORY (INHALATION) at 11:20

## 2021-06-29 RX ADMIN — VENLAFAXINE HYDROCHLORIDE SCH MG: 150 CAPSULE, EXTENDED RELEASE ORAL at 16:59

## 2021-06-29 RX ADMIN — TRAZODONE HYDROCHLORIDE SCH MG: 50 TABLET ORAL at 21:22

## 2021-06-29 RX ADMIN — METFORMIN HYDROCHLORIDE SCH MG: 500 TABLET, FILM COATED ORAL at 09:00

## 2021-06-29 RX ADMIN — ALBUTEROL SULFATE SCH MG: 1.25 SOLUTION RESPIRATORY (INHALATION) at 07:38

## 2021-06-29 RX ADMIN — MAGNESIUM OXIDE TAB 400 MG (241.3 MG ELEMENTAL MG) SCH MG: 400 (241.3 MG) TAB at 09:30

## 2021-06-29 RX ADMIN — GABAPENTIN SCH MG: 300 CAPSULE ORAL at 12:51

## 2021-06-29 RX ADMIN — Medication SCH MG: at 19:30

## 2021-06-29 RX ADMIN — APIXABAN SCH MG: 5 TABLET, FILM COATED ORAL at 09:39

## 2021-06-29 RX ADMIN — VENLAFAXINE HYDROCHLORIDE SCH MG: 150 CAPSULE, EXTENDED RELEASE ORAL at 09:43

## 2021-06-29 RX ADMIN — Medication SCH MG: at 03:30

## 2021-06-29 RX ADMIN — Medication SCH EA: at 10:06

## 2021-06-29 RX ADMIN — DILTIAZEM HYDROCHLORIDE SCH MG: 240 CAPSULE, EXTENDED RELEASE ORAL at 09:00

## 2021-06-29 RX ADMIN — ALBUTEROL SULFATE SCH MG: 1.25 SOLUTION RESPIRATORY (INHALATION) at 23:30

## 2021-06-29 RX ADMIN — Medication SCH MG: at 23:30

## 2021-06-29 RX ADMIN — GABAPENTIN SCH MG: 300 CAPSULE ORAL at 09:32

## 2021-06-29 RX ADMIN — Medication SCH MG: at 11:21

## 2021-06-29 RX ADMIN — Medication SCH EACH: at 13:19

## 2021-06-29 RX ADMIN — Medication SCH MG: at 15:01

## 2021-06-29 RX ADMIN — METFORMIN HYDROCHLORIDE SCH MG: 500 TABLET, FILM COATED ORAL at 16:55

## 2021-06-29 RX ADMIN — GABAPENTIN SCH MG: 300 CAPSULE ORAL at 16:59

## 2021-06-29 RX ADMIN — ALBUTEROL SULFATE SCH MG: 1.25 SOLUTION RESPIRATORY (INHALATION) at 19:30

## 2021-06-29 RX ADMIN — DEXTROSE MONOHYDRATE SCH MLS/HR: 50 INJECTION, SOLUTION INTRAVENOUS at 16:58

## 2021-06-29 NOTE — NUR
Patient has been NPO since midnight. Educated on upcoming procedure CT of chest. VSS. A&Ox3 
at this time. Confused at night trying to get out of bed. Safety protocol and sitter helped 
keep patient safe.

## 2021-06-29 NOTE — NUR
MS RN CLOSING NOTE



PATIENT SITTING IN A CHAIR. A/O X2. CONFUSED AT TIMES. 02 NC AT 2 LPM. NO SOB NOTED. NO S/S 
OF RESPIRATORY DISTRESS. SITTER AT THE BEDSIDE. IV ACCESS ON SHREYA MIDLINE, INTACT AND PATENT. 
ALL DUE MEDS GIVEN AS ORDERED. NO INSULIN COVERAGE AS PER PROTOCOL. ALL NEEDS HAVE BEEN MET 
AND ATTENDED. SAFETY MEASURES MAINTAINED. BED IN LOWEST POSITION, BRAKES LOCKED. SIDE RAILS 
UP X2. KEPT CALL LIGHT WITHIN REACH. WILL ENDORSE CONTINUITY OF CARE TO ONCOMING SHIFT.

## 2021-06-29 NOTE — NUR
MS RN OPENING NOTE



RECEIVED PATIENT SITTING IN A CHAIR. A/O X2. 02 NC AT 2 LPM. NO SOB NOTED. NO S/S OF 
RESPIRATORY DISTRESS. SITTER AT THE BEDSIDE. IV ACCESS ON SHREYA MIDLINE, INTACT. SAFETY 
MEASURES MAINTAINED. BED IN LOWEST POSITION, BRAKES LOCKED. SIDE RAILS UP X2. CALL LIGHT 
WITHIN REACH. WILL CONTINUE PLAN OF CARE.

## 2021-06-29 NOTE — NUR
RN NOTE



INFORMED DR JOE OH REGARDING THE RESULT OF STOOL CULTURE, (+) C-DIFF. AND HE 
ORDERED FLAGYL 500 MG PO TID. ORDER READBACK AND CARRIED OUT.

## 2021-06-29 NOTE — NUR
RN Opening Notes



Patient was last seen sleeping in bed. Patient's alert and oriented x2. Patient's on 2LPM of 
oxygen via NC with no respiratory distress noted. Patient has a SHREYA midline, which is intact 
and patent. Patient's in no acute distress at this time. Safety measures in place: Bed 
locked, bed alarm on, side rails up x3, and call light within reach. Will continue to 
monitor the patient. 

-------------------------------------------------------------------------------

Addendum: 06/29/21 at 1928 by RK SIMS RN

-------------------------------------------------------------------------------

Patient has a sitter at bedside. Patient's confused at times.

## 2021-06-30 VITALS — DIASTOLIC BLOOD PRESSURE: 59 MMHG | SYSTOLIC BLOOD PRESSURE: 94 MMHG

## 2021-06-30 LAB
ALBUMIN SERPL ELPH-MCNC: 2.4 G/DL (ref 2.9–4.4)
ALBUMIN/GLOB SERPL: 0.8 {RATIO} (ref 0.7–1.7)
ALPHA1 GLOB SERPL ELPH-MCNC: 0.4 G/DL (ref 0–0.4)
ALPHA2 GLOB SERPL ELPH-MCNC: 0.7 G/DL (ref 0.4–1)
B-GLOBULIN SERPL ELPH-MCNC: 1.1 G/DL (ref 0.7–1.3)
BASOPHILS # BLD AUTO: 0.1 K/UL (ref 0–0.2)
BASOPHILS NFR BLD AUTO: 0.6 % (ref 0–2)
BUN SERPL-MCNC: 11 MG/DL (ref 7–18)
CALCIUM SERPL-MCNC: 9 MG/DL (ref 8.5–10.1)
CANCER AG125 SERPL-ACNC: 21.8 U/ML (ref 0–38.1)
CANCER AG15-3 SERPL-ACNC: 8.4 U/ML (ref 0–25)
CHLORIDE SERPL-SCNC: 103 MMOL/L (ref 98–107)
CO2 SERPL-SCNC: 31 MMOL/L (ref 21–32)
CREAT SERPL-MCNC: 1.3 MG/DL (ref 0.6–1.3)
EOSINOPHIL NFR BLD AUTO: 3.5 % (ref 0–6)
GAMMA GLOB SERPL ELPH-MCNC: 1 G/DL (ref 0.4–1.8)
GLOBULIN SER CALC-MCNC: 3.1 G/DL (ref 2.2–3.9)
GLUCOSE SERPL-MCNC: 132 MG/DL (ref 74–106)
HCT VFR BLD AUTO: 30 % (ref 33–45)
HGB BLD-MCNC: 9.3 G/DL (ref 11.5–14.8)
IGA SERPL-MCNC: 236 MG/DL (ref 64–422)
IGM SERPL-MCNC: 68 MG/DL (ref 26–217)
LYMPHOCYTES NFR BLD AUTO: 2.5 K/UL (ref 0.8–4.8)
LYMPHOCYTES NFR BLD AUTO: 29.8 % (ref 20–44)
MAGNESIUM SERPL-MCNC: 2.4 MG/DL (ref 1.8–2.4)
MCHC RBC AUTO-ENTMCNC: 31 G/DL (ref 31–36)
MCV RBC AUTO: 96 FL (ref 82–100)
MONOCYTES NFR BLD AUTO: 0.7 K/UL (ref 0.1–1.3)
MONOCYTES NFR BLD AUTO: 8.4 % (ref 2–12)
NEUTROPHILS # BLD AUTO: 4.8 K/UL (ref 1.8–8.9)
NEUTROPHILS NFR BLD AUTO: 57.7 % (ref 43–81)
PHOSPHATE SERPL-MCNC: 3.5 MG/DL (ref 2.5–4.9)
PLATELET # BLD AUTO: 418 K/UL (ref 150–450)
POTASSIUM SERPL-SCNC: 3.9 MMOL/L (ref 3.5–5.1)
RBC # BLD AUTO: 3.1 MIL/UL (ref 4–5.2)
SODIUM SERPL-SCNC: 141 MMOL/L (ref 136–145)
WBC NRBC COR # BLD AUTO: 8.2 K/UL (ref 4.3–11)

## 2021-06-30 RX ADMIN — GABAPENTIN SCH MG: 300 CAPSULE ORAL at 17:46

## 2021-06-30 RX ADMIN — Medication SCH MG: at 15:30

## 2021-06-30 RX ADMIN — ALBUTEROL SULFATE SCH MG: 1.25 SOLUTION RESPIRATORY (INHALATION) at 11:02

## 2021-06-30 RX ADMIN — ALBUTEROL SULFATE SCH MG: 1.25 SOLUTION RESPIRATORY (INHALATION) at 07:35

## 2021-06-30 RX ADMIN — GABAPENTIN SCH MG: 300 CAPSULE ORAL at 08:55

## 2021-06-30 RX ADMIN — ACETAMINOPHEN PRN MG: 325 TABLET ORAL at 07:35

## 2021-06-30 RX ADMIN — FERROUS SULFATE TAB 325 MG (65 MG ELEMENTAL FE) SCH MG: 325 (65 FE) TAB at 08:54

## 2021-06-30 RX ADMIN — METRONIDAZOLE SCH MG: 500 TABLET ORAL at 04:05

## 2021-06-30 RX ADMIN — MONTELUKAST SODIUM SCH MG: 10 TABLET, FILM COATED ORAL at 08:54

## 2021-06-30 RX ADMIN — VENLAFAXINE HYDROCHLORIDE SCH MG: 150 CAPSULE, EXTENDED RELEASE ORAL at 17:48

## 2021-06-30 RX ADMIN — Medication SCH MG: at 07:35

## 2021-06-30 RX ADMIN — METFORMIN HYDROCHLORIDE SCH MG: 500 TABLET, FILM COATED ORAL at 17:46

## 2021-06-30 RX ADMIN — DEXTROSE MONOHYDRATE SCH MLS/HR: 50 INJECTION, SOLUTION INTRAVENOUS at 17:00

## 2021-06-30 RX ADMIN — METFORMIN HYDROCHLORIDE SCH MG: 500 TABLET, FILM COATED ORAL at 08:55

## 2021-06-30 RX ADMIN — Medication SCH EACH: at 18:14

## 2021-06-30 RX ADMIN — ALLOPURINOL SCH MG: 100 TABLET ORAL at 08:54

## 2021-06-30 RX ADMIN — VENLAFAXINE HYDROCHLORIDE SCH MG: 150 CAPSULE, EXTENDED RELEASE ORAL at 08:55

## 2021-06-30 RX ADMIN — Medication SCH EACH: at 06:33

## 2021-06-30 RX ADMIN — Medication SCH EACH: at 12:14

## 2021-06-30 RX ADMIN — MAGNESIUM OXIDE TAB 400 MG (241.3 MG ELEMENTAL MG) SCH MG: 400 (241.3 MG) TAB at 08:54

## 2021-06-30 RX ADMIN — GABAPENTIN SCH MG: 300 CAPSULE ORAL at 13:36

## 2021-06-30 RX ADMIN — ALBUTEROL SULFATE SCH MG: 1.25 SOLUTION RESPIRATORY (INHALATION) at 03:30

## 2021-06-30 RX ADMIN — Medication SCH MG: at 03:30

## 2021-06-30 RX ADMIN — ALBUTEROL SULFATE SCH MG: 1.25 SOLUTION RESPIRATORY (INHALATION) at 15:30

## 2021-06-30 RX ADMIN — DEXTROSE MONOHYDRATE SCH MLS/HR: 50 INJECTION, SOLUTION INTRAVENOUS at 18:00

## 2021-06-30 RX ADMIN — DILTIAZEM HYDROCHLORIDE SCH MG: 240 CAPSULE, EXTENDED RELEASE ORAL at 10:00

## 2021-06-30 RX ADMIN — Medication SCH MG: at 11:02

## 2021-06-30 RX ADMIN — METRONIDAZOLE SCH MG: 500 TABLET ORAL at 13:36

## 2021-06-30 RX ADMIN — Medication SCH EA: at 09:30

## 2021-06-30 NOTE — NUR
MS RN NOTE



PATIENT SEEN BY DR. OH WITH ORDER TO DISCHARGE PATIENT. PATIENT VERBALIZED 
UNDERSTANDIGN OF MD ORDERS. HEALTH TEACHING DONE REGARDING DISCHARGE ORDERS AND MEDICATION 
AS ORDERED. VERBALIZED UNDERSTANDIGN AND APPRECIATION. WILL CONTINUE TO MONITOR PATIENT,

## 2021-06-30 NOTE — NUR
MS RN OPENING NOTE



RECEIVED PATIENT ON BED. ALERT AND ORIENTED X2. WITH NO SIGNS OF DISTESS. PATIENT ON 02 NC 
WHEN ON BED BUT USUALLY REMOVES IT BUT TOLERATED WELL. NO SOB NOTED. SITTER AT THE BEDSIDE. 
IV ACCESS ON SHREYA MIDLINE, INTACT. SAFETY MEASURES MAINTAINED. BED IN LOWEST POSITION, BRAKES 
LOCKED. SIDE RAILS UP X2. CALL LIGHT WITHIN REACH. WILL CONTINUE TO MONITOR PATIENT.

## 2021-06-30 NOTE — NUR
RN Closing Notes



Patient was last seen awake resting in bed. Patient's alert and oriented x2 and can be 
confused at times. Patient's on 2LPM of oxygen via nasal cannula with no respiratory 
distress noted. Patient has a SHREYA midline, which is intact and patent. Patient's in no acute 
distress at this time. Safety measures in place: Sitter in the room, bed locked, bed alarm 
on, side rails up x3, and call light within reach. Will endorse care to the day shift nurse.

## 2021-06-30 NOTE — NUR
MS RN NOTE



PATIENT ENDORSED TO NURSE ISABEL AT [facility] [phone]. PATIENT WILL BE SENT WITH 
MEDICATIONS SUBLINGUALLY AS REQUESTED BY PATIENT'S DAUGHTER DR. NIEVES [phone]. 
COMFORT MEASURES PROVIDED. HEALTH TEACHING DONE REGARDIGN DISCHARGE AND VERBALIZED 
UNDERSTANDING. IV ACCESS ON THE RIGHT ARM REMOVED PER PATINET'S REQUEST. DISHCARGE 
INSTRUCTION AND INVENTORY LIST SIGNED AND ATTACHED TO CHART. BODY CHECK DONE AND DOCUMENTED 
ACCORDINGLY. WILL CONTINUE TO MONITOR PATIENT.

## 2021-06-30 NOTE — NUR
MS RN NOTE



PATIENT PICKED UP BY EMT FOR TRANSFER TO [facility] S ORDERED. PATIUMAIRN IN 
STABLE CONDITION. PATIENT LEFT WITH 2 EMT ON A GURNEY. ENDORSED ACCORDINGLY.

## 2021-07-31 ENCOUNTER — HOSPITAL ENCOUNTER (EMERGENCY)
Dept: HOSPITAL 54 - ER | Age: 76
Discharge: HOME | End: 2021-07-31
Payer: MEDICARE

## 2021-07-31 VITALS — HEIGHT: 65 IN | BODY MASS INDEX: 37.15 KG/M2 | WEIGHT: 223 LBS

## 2021-07-31 VITALS — DIASTOLIC BLOOD PRESSURE: 64 MMHG | SYSTOLIC BLOOD PRESSURE: 127 MMHG

## 2021-07-31 DIAGNOSIS — E11.40: ICD-10-CM

## 2021-07-31 DIAGNOSIS — L03.116: ICD-10-CM

## 2021-07-31 DIAGNOSIS — M79.7: ICD-10-CM

## 2021-07-31 DIAGNOSIS — R60.0: Primary | ICD-10-CM

## 2021-07-31 DIAGNOSIS — Z98.890: ICD-10-CM

## 2021-07-31 DIAGNOSIS — F03.90: ICD-10-CM

## 2021-07-31 DIAGNOSIS — Z79.84: ICD-10-CM

## 2021-07-31 DIAGNOSIS — I11.0: ICD-10-CM

## 2021-07-31 DIAGNOSIS — F17.200: ICD-10-CM

## 2021-07-31 DIAGNOSIS — L03.115: ICD-10-CM

## 2021-07-31 DIAGNOSIS — I50.9: ICD-10-CM

## 2021-07-31 DIAGNOSIS — F32.9: ICD-10-CM

## 2021-07-31 DIAGNOSIS — F41.9: ICD-10-CM

## 2021-07-31 DIAGNOSIS — E78.5: ICD-10-CM

## 2021-07-31 NOTE — NUR
PT BIB SELF C/O BILATERAL LOWER LEG SWELLING FOR 2 WEEKS. PT IS AAOX4, NOT IN 
RESPIRATORY DISTRESS, HOOKED TO CARDIAC MONITOR, KEPT RESTED AND COMFORTABLE. 
WILL CONTINUE TO MONITOR.

## 2021-08-02 ENCOUNTER — HOSPITAL ENCOUNTER (INPATIENT)
Dept: HOSPITAL 54 - ER | Age: 76
LOS: 3 days | Discharge: ANOTHER HOSPITAL | DRG: 885 | End: 2021-08-05
Attending: NURSE PRACTITIONER | Admitting: PSYCHIATRY & NEUROLOGY
Payer: MEDICARE

## 2021-08-02 VITALS — DIASTOLIC BLOOD PRESSURE: 54 MMHG | SYSTOLIC BLOOD PRESSURE: 134 MMHG

## 2021-08-02 VITALS — DIASTOLIC BLOOD PRESSURE: 61 MMHG | SYSTOLIC BLOOD PRESSURE: 98 MMHG

## 2021-08-02 VITALS — BODY MASS INDEX: 36.99 KG/M2 | WEIGHT: 222 LBS | HEIGHT: 65 IN

## 2021-08-02 VITALS — SYSTOLIC BLOOD PRESSURE: 113 MMHG | DIASTOLIC BLOOD PRESSURE: 80 MMHG

## 2021-08-02 DIAGNOSIS — F32.9: ICD-10-CM

## 2021-08-02 DIAGNOSIS — I50.32: ICD-10-CM

## 2021-08-02 DIAGNOSIS — E66.01: ICD-10-CM

## 2021-08-02 DIAGNOSIS — Z87.891: ICD-10-CM

## 2021-08-02 DIAGNOSIS — T50.2X5A: ICD-10-CM

## 2021-08-02 DIAGNOSIS — Z79.01: ICD-10-CM

## 2021-08-02 DIAGNOSIS — D63.8: ICD-10-CM

## 2021-08-02 DIAGNOSIS — Z79.84: ICD-10-CM

## 2021-08-02 DIAGNOSIS — R45.851: ICD-10-CM

## 2021-08-02 DIAGNOSIS — G93.40: ICD-10-CM

## 2021-08-02 DIAGNOSIS — Z90.49: ICD-10-CM

## 2021-08-02 DIAGNOSIS — N17.0: ICD-10-CM

## 2021-08-02 DIAGNOSIS — M79.7: ICD-10-CM

## 2021-08-02 DIAGNOSIS — N18.9: ICD-10-CM

## 2021-08-02 DIAGNOSIS — F03.91: ICD-10-CM

## 2021-08-02 DIAGNOSIS — F39: Primary | ICD-10-CM

## 2021-08-02 DIAGNOSIS — J44.9: ICD-10-CM

## 2021-08-02 DIAGNOSIS — I25.10: ICD-10-CM

## 2021-08-02 DIAGNOSIS — E87.6: ICD-10-CM

## 2021-08-02 DIAGNOSIS — F41.9: ICD-10-CM

## 2021-08-02 DIAGNOSIS — Z91.19: ICD-10-CM

## 2021-08-02 DIAGNOSIS — Z79.899: ICD-10-CM

## 2021-08-02 DIAGNOSIS — Z73.6: ICD-10-CM

## 2021-08-02 DIAGNOSIS — E78.5: ICD-10-CM

## 2021-08-02 DIAGNOSIS — E11.22: ICD-10-CM

## 2021-08-02 DIAGNOSIS — R21: ICD-10-CM

## 2021-08-02 DIAGNOSIS — I13.0: ICD-10-CM

## 2021-08-02 DIAGNOSIS — K83.8: ICD-10-CM

## 2021-08-02 DIAGNOSIS — I48.20: ICD-10-CM

## 2021-08-02 DIAGNOSIS — G47.00: ICD-10-CM

## 2021-08-02 DIAGNOSIS — Z98.890: ICD-10-CM

## 2021-08-02 DIAGNOSIS — L03.115: ICD-10-CM

## 2021-08-02 DIAGNOSIS — Z90.710: ICD-10-CM

## 2021-08-02 DIAGNOSIS — Z20.822: ICD-10-CM

## 2021-08-02 DIAGNOSIS — E11.51: ICD-10-CM

## 2021-08-02 DIAGNOSIS — L03.116: ICD-10-CM

## 2021-08-02 LAB
ALBUMIN SERPL BCP-MCNC: 3.8 G/DL (ref 3.4–5)
ALP SERPL-CCNC: 81 U/L (ref 46–116)
ALT SERPL W P-5'-P-CCNC: 18 U/L (ref 12–78)
APAP SERPL-MCNC: 0 UG/ML (ref 10–30)
AST SERPL W P-5'-P-CCNC: 28 U/L (ref 15–37)
BASOPHILS # BLD AUTO: 0.1 K/UL (ref 0–0.2)
BASOPHILS NFR BLD AUTO: 0.6 % (ref 0–2)
BILIRUB DIRECT SERPL-MCNC: 0.1 MG/DL (ref 0–0.2)
BILIRUB SERPL-MCNC: 0.5 MG/DL (ref 0.2–1)
BILIRUB UR QL STRIP: NEGATIVE
BUN SERPL-MCNC: 15 MG/DL (ref 7–18)
CALCIUM SERPL-MCNC: 10.2 MG/DL (ref 8.5–10.1)
CHLORIDE SERPL-SCNC: 95 MMOL/L (ref 98–107)
CO2 SERPL-SCNC: 36 MMOL/L (ref 21–32)
COLOR UR: YELLOW
CREAT SERPL-MCNC: 1.7 MG/DL (ref 0.6–1.3)
EOSINOPHIL NFR BLD AUTO: 1.7 % (ref 0–6)
ETHANOL SERPL-MCNC: < 3 MG/DL (ref 0–0)
GLUCOSE SERPL-MCNC: 111 MG/DL (ref 74–106)
GLUCOSE UR STRIP-MCNC: (no result) MG/DL
HCT VFR BLD AUTO: 32 % (ref 33–45)
HGB BLD-MCNC: 10 G/DL (ref 11.5–14.8)
LEUKOCYTE ESTERASE UR QL STRIP: NEGATIVE
LYMPHOCYTES NFR BLD AUTO: 1.2 K/UL (ref 0.8–4.8)
LYMPHOCYTES NFR BLD AUTO: 13.7 % (ref 20–44)
MCHC RBC AUTO-ENTMCNC: 31 G/DL (ref 31–36)
MCV RBC AUTO: 90 FL (ref 82–100)
MONOCYTES NFR BLD AUTO: 0.8 K/UL (ref 0.1–1.3)
MONOCYTES NFR BLD AUTO: 9 % (ref 2–12)
NEUTROPHILS # BLD AUTO: 6.4 K/UL (ref 1.8–8.9)
NEUTROPHILS NFR BLD AUTO: 75 % (ref 43–81)
NITRITE UR QL STRIP: NEGATIVE
PH UR STRIP: 7.5 [PH] (ref 5–8)
PLATELET # BLD AUTO: 519 K/UL (ref 150–450)
POTASSIUM SERPL-SCNC: 2.9 MMOL/L (ref 3.5–5.1)
PROT SERPL-MCNC: 8.3 G/DL (ref 6.4–8.2)
PROT UR QL STRIP: NEGATIVE MG/DL
RBC # BLD AUTO: 3.55 MIL/UL (ref 4–5.2)
RBC #/AREA URNS HPF: (no result) /HPF (ref 0–2)
SODIUM SERPL-SCNC: 140 MMOL/L (ref 136–145)
UROBILINOGEN UR STRIP-MCNC: 0.2 EU/DL
WBC #/AREA URNS HPF: (no result) /HPF (ref 0–3)
WBC NRBC COR # BLD AUTO: 8.5 K/UL (ref 4.3–11)

## 2021-08-02 PROCEDURE — C9803 HOPD COVID-19 SPEC COLLECT: HCPCS

## 2021-08-02 PROCEDURE — G0480 DRUG TEST DEF 1-7 CLASSES: HCPCS

## 2021-08-02 RX ADMIN — Medication SCH EACH: at 22:16

## 2021-08-02 RX ADMIN — INSULIN HUMAN PRN UNITS: 100 INJECTION, SOLUTION PARENTERAL at 18:16

## 2021-08-02 RX ADMIN — GABAPENTIN SCH MG: 300 CAPSULE ORAL at 14:01

## 2021-08-02 RX ADMIN — DILTIAZEM HYDROCHLORIDE SCH MG: 120 CAPSULE, EXTENDED RELEASE ORAL at 18:00

## 2021-08-02 RX ADMIN — CALCIUM CARBONATE (ANTACID) CHEW TAB 500 MG SCH MG: 500 CHEW TAB at 17:00

## 2021-08-02 RX ADMIN — Medication SCH UNIT: at 17:00

## 2021-08-02 RX ADMIN — ATORVASTATIN CALCIUM SCH MG: 10 TABLET, FILM COATED ORAL at 21:07

## 2021-08-02 RX ADMIN — INSULIN HUMAN PRN UNITS: 100 INJECTION, SOLUTION PARENTERAL at 22:20

## 2021-08-02 RX ADMIN — Medication SCH EACH: at 18:09

## 2021-08-02 RX ADMIN — TRAZODONE HYDROCHLORIDE SCH MG: 50 TABLET ORAL at 22:00

## 2021-08-02 RX ADMIN — GABAPENTIN SCH MG: 300 CAPSULE ORAL at 17:00

## 2021-08-02 RX ADMIN — Medication SCH MG: at 18:10

## 2021-08-02 RX ADMIN — SULFAMETHOXAZOLE AND TRIMETHOPRIM SCH UDTAB: 800; 160 TABLET ORAL at 18:09

## 2021-08-02 RX ADMIN — EZETIMIBE SCH MG: 10 TABLET ORAL at 21:07

## 2021-08-02 RX ADMIN — APIXABAN SCH MG: 5 TABLET, FILM COATED ORAL at 17:56

## 2021-08-02 RX ADMIN — Medication SCH EA: at 20:02
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
## 2021-08-02 NOTE — NUR
RN NOTE:

PT'S DAUGHTER NOTIFIED OF ADMISSION. DAUGHTER STATED BY WAS SEEN IN [facility] ER 7/31 AND STARTED 
ON KEFLEX AND TO BE FOLLOWED WITH BACTRIM IF NO IMPROVEMENT IN TWO DAYS. DAUGHTER STATED 
MOTHER IS ON SUBOXONE AND LASIX DAILY. LUIS GOODWIN NOTIFIED

## 2021-08-02 NOTE — NUR
RN NOTE: MEDICATION REFUSAL

PT REFUSED 2200 PO MEDICATIONS. ATTEMPTED TO EDUCATE PT RE IMPORTANCE OF MEDICATION 
COMPLIANCE. PT CONT'D TO REFUSE X 3. WILL CONT TO MONITOR FOR SAFETY AND BEHAVIOR
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
## 2021-08-02 NOTE — NUR
PT BIB  C/O SUICIDAL IDEATION " IM GONNA TAKE ALL MY MEDICATION PILLS" 
PT IS AAOX3, NOT IN RESPIRATORY DISTRESS, V/S STABLE, KEPT RESTED AND 
COMFORTABLE. SITTER AT BEDSIDE. WILL CONTINUE TO MONITOR.

## 2021-08-03 VITALS — DIASTOLIC BLOOD PRESSURE: 59 MMHG | SYSTOLIC BLOOD PRESSURE: 115 MMHG

## 2021-08-03 VITALS — DIASTOLIC BLOOD PRESSURE: 55 MMHG | SYSTOLIC BLOOD PRESSURE: 99 MMHG

## 2021-08-03 VITALS — SYSTOLIC BLOOD PRESSURE: 98 MMHG | DIASTOLIC BLOOD PRESSURE: 50 MMHG

## 2021-08-03 VITALS — SYSTOLIC BLOOD PRESSURE: 110 MMHG | DIASTOLIC BLOOD PRESSURE: 62 MMHG

## 2021-08-03 LAB
ALBUMIN SERPL BCP-MCNC: 3.9 G/DL (ref 3.4–5)
ALP SERPL-CCNC: 86 U/L (ref 46–116)
ALT SERPL W P-5'-P-CCNC: 14 U/L (ref 12–78)
AST SERPL W P-5'-P-CCNC: 23 U/L (ref 15–37)
BILIRUB SERPL-MCNC: 0.5 MG/DL (ref 0.2–1)
BUN SERPL-MCNC: 16 MG/DL (ref 7–18)
CALCIUM SERPL-MCNC: 10.2 MG/DL (ref 8.5–10.1)
CHLORIDE SERPL-SCNC: 94 MMOL/L (ref 98–107)
CHOLEST SERPL-MCNC: 136 MG/DL (ref ?–200)
CO2 SERPL-SCNC: 34 MMOL/L (ref 21–32)
CREAT SERPL-MCNC: 1.8 MG/DL (ref 0.6–1.3)
CREAT UR-MCNC: 127.4 MG/DL (ref 30–125)
GLUCOSE SERPL-MCNC: 131 MG/DL (ref 74–106)
HDLC SERPL-MCNC: 63 MG/DL (ref 40–60)
LDLC SERPL DIRECT ASSAY-MCNC: 51 MG/DL (ref 0–99)
POTASSIUM SERPL-SCNC: 3.3 MMOL/L (ref 3.5–5.1)
PROT SERPL-MCNC: 8.9 G/DL (ref 6.4–8.2)
SODIUM SERPL-SCNC: 138 MMOL/L (ref 136–145)
SODIUM UR-SCNC: 52 MMOL/L (ref 40–220)
TRIGL SERPL-MCNC: 129 MG/DL (ref 30–150)

## 2021-08-03 RX ADMIN — Medication SCH EACH: at 07:30

## 2021-08-03 RX ADMIN — Medication SCH EACH: at 17:35

## 2021-08-03 RX ADMIN — MONTELUKAST SODIUM SCH MG: 10 TABLET, FILM COATED ORAL at 08:56

## 2021-08-03 RX ADMIN — Medication SCH EA: at 17:50

## 2021-08-03 RX ADMIN — Medication SCH MG: at 06:20

## 2021-08-03 RX ADMIN — INSULIN HUMAN PRN UNITS: 100 INJECTION, SOLUTION PARENTERAL at 17:53

## 2021-08-03 RX ADMIN — EZETIMIBE SCH MG: 10 TABLET ORAL at 22:36

## 2021-08-03 RX ADMIN — CALCIUM CARBONATE (ANTACID) CHEW TAB 500 MG SCH MG: 500 CHEW TAB at 17:51

## 2021-08-03 RX ADMIN — DILTIAZEM HYDROCHLORIDE SCH MG: 120 CAPSULE, EXTENDED RELEASE ORAL at 17:36

## 2021-08-03 RX ADMIN — Medication SCH EACH: at 22:34

## 2021-08-03 RX ADMIN — Medication SCH MG: at 12:46

## 2021-08-03 RX ADMIN — Medication SCH MG: at 18:00

## 2021-08-03 RX ADMIN — CALCIUM CARBONATE (ANTACID) CHEW TAB 500 MG SCH MG: 500 CHEW TAB at 08:56

## 2021-08-03 RX ADMIN — Medication SCH UNIT: at 08:56

## 2021-08-03 RX ADMIN — APIXABAN SCH MG: 5 TABLET, FILM COATED ORAL at 17:53

## 2021-08-03 RX ADMIN — MAGNESIUM OXIDE TAB 400 MG (241.3 MG ELEMENTAL MG) SCH MG: 400 (241.3 MG) TAB at 08:56

## 2021-08-03 RX ADMIN — SULFAMETHOXAZOLE AND TRIMETHOPRIM SCH UDTAB: 800; 160 TABLET ORAL at 17:51

## 2021-08-03 RX ADMIN — Medication SCH EA: at 08:56

## 2021-08-03 RX ADMIN — ALLOPURINOL SCH MG: 100 TABLET ORAL at 08:56

## 2021-08-03 RX ADMIN — SULFAMETHOXAZOLE AND TRIMETHOPRIM SCH UDTAB: 800; 160 TABLET ORAL at 08:56

## 2021-08-03 RX ADMIN — APIXABAN SCH MG: 5 TABLET, FILM COATED ORAL at 09:07

## 2021-08-03 RX ADMIN — GABAPENTIN SCH MG: 300 CAPSULE ORAL at 13:00

## 2021-08-03 RX ADMIN — Medication SCH MG: at 12:00

## 2021-08-03 RX ADMIN — FERROUS SULFATE TAB 325 MG (65 MG ELEMENTAL FE) SCH MG: 325 (65 FE) TAB at 08:56

## 2021-08-03 RX ADMIN — Medication SCH EA: at 09:15

## 2021-08-03 RX ADMIN — GABAPENTIN SCH MG: 300 CAPSULE ORAL at 09:05

## 2021-08-03 RX ADMIN — TRAZODONE HYDROCHLORIDE SCH MG: 50 TABLET ORAL at 22:00

## 2021-08-03 RX ADMIN — GABAPENTIN SCH MG: 300 CAPSULE ORAL at 17:51

## 2021-08-03 RX ADMIN — FUROSEMIDE SCH MG: 40 TABLET ORAL at 11:00

## 2021-08-03 RX ADMIN — ONDANSETRON PRN MG: 4 TABLET, ORALLY DISINTEGRATING ORAL at 14:35

## 2021-08-03 RX ADMIN — Medication SCH EACH: at 12:46

## 2021-08-03 RX ADMIN — Medication SCH MG: at 00:34

## 2021-08-03 RX ADMIN — ATORVASTATIN CALCIUM SCH MG: 10 TABLET, FILM COATED ORAL at 22:36

## 2021-08-03 RX ADMIN — VENLAFAXINE HYDROCHLORIDE SCH MG: 150 CAPSULE, EXTENDED RELEASE ORAL at 08:56

## 2021-08-03 NOTE — NUR
RN NOTE



PATIENT'S BLOOD SUGAR  MG/DL, NO SSI COVERAGE NEEDED AT THIS TIME. PATIENT WAS 
REFUSING TO EAT ANY SNACK AT THIS TIME, ENCOURAGED PATIENT FOR PO INTAKE, OFFERED SNACK  TO 
THE PATIENT & SHE AGREED TO HAVE JELLO & TOLERATED WELL. WILL CONTINUE TO MONITOR.

## 2021-08-03 NOTE — NUR
RN NOTE



PATIENT WOKE UP & WANTED TO USE THE RESTROOM, ASSISTED PATIENT TO THE RESTROOM & BACK TO 
BED. NOTED TO BE STEADY WITH WALKER & STAND BY ASSIST.

## 2021-08-03 NOTE — NUR
RN NOTE: MEDICATION REFUSAL



PATIENT REFUSED 2200 TRAZODONE & LAMICTAL DESPITE OF RISKS & BENEFITS EXPLANATIONS. PATIENT 
IS SELECTIVE WITH MEDICATIONS AT THIS TIME, STATED," IT'S TOO MUCH MEDICINE, I AM TIRED, I 
WANT TO SLEEP, WHY YOU WAKE ME UP." PATIENT IS NON COMPLAINT DESPITE OF EXPLANATIONS, 
CONTINUED TO REFUSE & WANTS TO SLEEP AT THIS TIME. WILL CONTINUE TO MONITOR.

## 2021-08-04 VITALS — DIASTOLIC BLOOD PRESSURE: 59 MMHG | SYSTOLIC BLOOD PRESSURE: 109 MMHG

## 2021-08-04 VITALS — DIASTOLIC BLOOD PRESSURE: 75 MMHG | SYSTOLIC BLOOD PRESSURE: 111 MMHG

## 2021-08-04 VITALS — SYSTOLIC BLOOD PRESSURE: 121 MMHG | DIASTOLIC BLOOD PRESSURE: 76 MMHG

## 2021-08-04 VITALS — SYSTOLIC BLOOD PRESSURE: 100 MMHG | DIASTOLIC BLOOD PRESSURE: 52 MMHG

## 2021-08-04 RX ADMIN — EZETIMIBE SCH MG: 10 TABLET ORAL at 22:44

## 2021-08-04 RX ADMIN — Medication SCH EACH: at 07:31

## 2021-08-04 RX ADMIN — TRAZODONE HYDROCHLORIDE SCH MG: 50 TABLET ORAL at 22:00

## 2021-08-04 RX ADMIN — Medication SCH MG: at 17:52

## 2021-08-04 RX ADMIN — GABAPENTIN SCH MG: 300 CAPSULE ORAL at 08:36

## 2021-08-04 RX ADMIN — Medication SCH EACH: at 16:20

## 2021-08-04 RX ADMIN — SULFAMETHOXAZOLE AND TRIMETHOPRIM SCH UDTAB: 800; 160 TABLET ORAL at 08:37

## 2021-08-04 RX ADMIN — Medication SCH EA: at 08:37

## 2021-08-04 RX ADMIN — Medication SCH MG: at 22:44

## 2021-08-04 RX ADMIN — Medication SCH EA: at 18:10

## 2021-08-04 RX ADMIN — Medication SCH EACH: at 12:01

## 2021-08-04 RX ADMIN — MAGNESIUM OXIDE TAB 400 MG (241.3 MG ELEMENTAL MG) SCH MG: 400 (241.3 MG) TAB at 08:37

## 2021-08-04 RX ADMIN — DILTIAZEM HYDROCHLORIDE SCH MG: 120 CAPSULE, EXTENDED RELEASE ORAL at 17:36

## 2021-08-04 RX ADMIN — SULFAMETHOXAZOLE AND TRIMETHOPRIM SCH UDTAB: 800; 160 TABLET ORAL at 17:52

## 2021-08-04 RX ADMIN — APIXABAN SCH MG: 5 TABLET, FILM COATED ORAL at 17:52

## 2021-08-04 RX ADMIN — FERROUS SULFATE TAB 325 MG (65 MG ELEMENTAL FE) SCH MG: 325 (65 FE) TAB at 08:38

## 2021-08-04 RX ADMIN — VENLAFAXINE HYDROCHLORIDE SCH MG: 150 CAPSULE, EXTENDED RELEASE ORAL at 08:37

## 2021-08-04 RX ADMIN — FUROSEMIDE SCH MG: 40 TABLET ORAL at 08:40

## 2021-08-04 RX ADMIN — GABAPENTIN SCH MG: 300 CAPSULE ORAL at 17:52

## 2021-08-04 RX ADMIN — GABAPENTIN SCH MG: 300 CAPSULE ORAL at 12:17

## 2021-08-04 RX ADMIN — ONDANSETRON PRN MG: 4 TABLET, ORALLY DISINTEGRATING ORAL at 13:34

## 2021-08-04 RX ADMIN — CALCIUM CARBONATE (ANTACID) CHEW TAB 500 MG SCH MG: 500 CHEW TAB at 17:52

## 2021-08-04 RX ADMIN — ATORVASTATIN CALCIUM SCH MG: 10 TABLET, FILM COATED ORAL at 22:44

## 2021-08-04 RX ADMIN — Medication SCH MG: at 12:02

## 2021-08-04 RX ADMIN — Medication SCH MG: at 06:11

## 2021-08-04 RX ADMIN — CALCIUM CARBONATE (ANTACID) CHEW TAB 500 MG SCH MG: 500 CHEW TAB at 08:36

## 2021-08-04 RX ADMIN — Medication SCH EACH: at 22:45

## 2021-08-04 RX ADMIN — ALLOPURINOL SCH MG: 100 TABLET ORAL at 08:38

## 2021-08-04 RX ADMIN — APIXABAN SCH MG: 5 TABLET, FILM COATED ORAL at 08:38

## 2021-08-04 RX ADMIN — Medication SCH EA: at 08:33

## 2021-08-04 RX ADMIN — INSULIN HUMAN PRN UNITS: 100 INJECTION, SOLUTION PARENTERAL at 07:34

## 2021-08-04 RX ADMIN — Medication SCH MG: at 00:07

## 2021-08-04 RX ADMIN — MONTELUKAST SODIUM SCH MG: 10 TABLET, FILM COATED ORAL at 08:37

## 2021-08-04 NOTE — NUR
BS:

0730: 132 2 UNITS

1200: 114 NC

1700: 125 NC



BLOOD SUGAR METER NOT TRANSMITTING INFO. NOC SHIFT RN AWARE

## 2021-08-04 NOTE — NUR
DR FARRELL CONTACTED IN ORDER TO INFORM HIM THAT PER DAUGHTER, THE PATIENT REQUIRES 
MEDICATION IN ORDER TO BE COMPLIANT WITH MRI WHILE GOING THROUGH THE PROCEDURE DUE TO 
POSSIBLE CLAUSTROPHOBIA. DR FARRELL ORDERED ATIVAN 1MG PO ONE TIME. ALSO DR FARRELL WAS MADE 
AWARE THAT DAUGHTER DR CARLYLE TAYLOR WISHES TO SPEAK WITH HIM, TO WHICH HE STATED HE WILL 
PERHAPS CALL HER TONIGHT OR TOMORROW AS HE HAS ALREADY SPOKEN TO HER. DR FARRELL HAS DR CARLYLE TAYLOR CONTACT INFO.

## 2021-08-04 NOTE — NUR
Family Contact: 

EWELINA received a call from pts daughter, Rain, [phone] requesting for SW to follow up 
with Dr. Barnes regarding Form 602 for assisted living placement. Rain stated that the 
form must be filled out by a psychiatrist. Rain stated that the she is unsure of the d/c 
plan at this time but is considering assisted living. Rain was provided with SW email in 
order to provide the form. EWELINA will f/u when the form has been received. EWELINA will continue to 
f/u with pts daughter regarding d/c plans.

## 2021-08-04 NOTE — NUR
RN NOTE



PATIENT WOKE UP, ASSISTED TO THE BATHROOM USING HER WALKER & THEN BACK TO BED, PATIENT 
REMAINS, CONFUSED & FORGETFUL AT TIMES, ASKING QUESTIONS REPETITIVELY EVEN ALL HER  
QUESTIONS ARE ANSWERED. PARANOID AT TIMES. REDIRECTABLE AT THIS TIME. WILL ENDORSE TO AM RN 
FOR CONTINUITY OF CARE.

## 2021-08-04 NOTE — NUR
DR SPEARS CONTACTED AS PER MRI QUESTIONNAIRE GUIDELINES IN ORDER TO INFORM HIM THAT THE 
PATIENT HAD AN L4-L5 ANTERIOR INTERBODY FUSION/POSTERIOR PERCUTANEOUS FIXATION/VASCULAR 
EXPOSURE FOR ANTERIOR SPINE APPROACH. DR SPEARS GAVE ORDER TO PROCEED WITH MRI.

## 2021-08-04 NOTE — NUR
BS:

0730: 107 NC

1200: 106 NC

1700: 104 NC



BLOOD SUGAR METER NOT TRANSMITTING INFO. DAMON PEMBERTON RN AWARE

-------------------------------------------------------------------------------

Addendum: 08/04/21 at 1924 by JACINDA AGUAYO RN

-------------------------------------------------------------------------------

WRONG PATIENT PLEASE DISREGARD.

## 2021-08-04 NOTE — NUR
RECEIVED PATIENT IN ROOM LAYING IN BED PATIENT IS ALERT, ORIENTED X 1-2, ON ROOM AIR PATIENT 
WENT TO THE BATHROOM. NO ACUTE DISTRESS NOTED. PT OXYGEN LEVEL WENT DOWN TO 84%. ASSISTED 
BACK PT TO BED, HEAD OF THE BED ELEVATED. BP CHECKED 109/48, , BS WAS 112MG/DL. 
PATIENT LOOKS VERY LETHARGIC. DENIES ANY PAIN OR DISCOMFORT AT THIS TIME. CHRISTA JACKSON CAME TO 
THE UNIT AND ASSESSED THE PATIENT AND STATED PATIENT NEEDS AN IVPB FOR BILATERAL LOWER LEG 
CELLULITIS.  PAGED DR. OLU DOUGLAS NOTIFIED OF PATIENT'S CONDITION AND OBTAINED TELEPHONE 
ORDER TO TRANSFER PATIENT TO MEDICAL SURGICAL FLOOR FOR FURTHER TREATMENT. 2300- DR. FARRELL 
NOTIFIED AND WITH ORDER TO CONTINUE THE HOLD.

## 2021-08-05 ENCOUNTER — HOSPITAL ENCOUNTER (INPATIENT)
Dept: HOSPITAL 54 - MED | Age: 76
LOS: 2 days | Discharge: PSYCHIATRIC OR SUBSTANCE USE FACILITY | DRG: 291 | End: 2021-08-07
Attending: NURSE PRACTITIONER | Admitting: INTERNAL MEDICINE
Payer: MEDICARE

## 2021-08-05 VITALS — BODY MASS INDEX: 36.32 KG/M2 | HEIGHT: 65 IN | WEIGHT: 218 LBS

## 2021-08-05 VITALS — SYSTOLIC BLOOD PRESSURE: 115 MMHG | DIASTOLIC BLOOD PRESSURE: 64 MMHG

## 2021-08-05 VITALS — SYSTOLIC BLOOD PRESSURE: 100 MMHG | DIASTOLIC BLOOD PRESSURE: 53 MMHG

## 2021-08-05 VITALS — SYSTOLIC BLOOD PRESSURE: 112 MMHG | DIASTOLIC BLOOD PRESSURE: 54 MMHG

## 2021-08-05 VITALS — SYSTOLIC BLOOD PRESSURE: 109 MMHG | DIASTOLIC BLOOD PRESSURE: 62 MMHG

## 2021-08-05 DIAGNOSIS — Z79.899: ICD-10-CM

## 2021-08-05 DIAGNOSIS — I25.10: ICD-10-CM

## 2021-08-05 DIAGNOSIS — Z79.84: ICD-10-CM

## 2021-08-05 DIAGNOSIS — J96.12: ICD-10-CM

## 2021-08-05 DIAGNOSIS — F29: ICD-10-CM

## 2021-08-05 DIAGNOSIS — E78.5: ICD-10-CM

## 2021-08-05 DIAGNOSIS — F03.90: ICD-10-CM

## 2021-08-05 DIAGNOSIS — J44.9: ICD-10-CM

## 2021-08-05 DIAGNOSIS — I13.0: Primary | ICD-10-CM

## 2021-08-05 DIAGNOSIS — E66.2: ICD-10-CM

## 2021-08-05 DIAGNOSIS — Z73.6: ICD-10-CM

## 2021-08-05 DIAGNOSIS — G93.40: ICD-10-CM

## 2021-08-05 DIAGNOSIS — G89.29: ICD-10-CM

## 2021-08-05 DIAGNOSIS — F41.9: ICD-10-CM

## 2021-08-05 DIAGNOSIS — I48.91: ICD-10-CM

## 2021-08-05 DIAGNOSIS — D47.3: ICD-10-CM

## 2021-08-05 DIAGNOSIS — R45.851: ICD-10-CM

## 2021-08-05 DIAGNOSIS — I87.8: ICD-10-CM

## 2021-08-05 DIAGNOSIS — F32.9: ICD-10-CM

## 2021-08-05 DIAGNOSIS — E11.22: ICD-10-CM

## 2021-08-05 DIAGNOSIS — Z90.49: ICD-10-CM

## 2021-08-05 DIAGNOSIS — G47.00: ICD-10-CM

## 2021-08-05 DIAGNOSIS — Z90.710: ICD-10-CM

## 2021-08-05 DIAGNOSIS — L03.116: ICD-10-CM

## 2021-08-05 DIAGNOSIS — R21: ICD-10-CM

## 2021-08-05 DIAGNOSIS — D63.8: ICD-10-CM

## 2021-08-05 DIAGNOSIS — M79.7: ICD-10-CM

## 2021-08-05 DIAGNOSIS — F39: ICD-10-CM

## 2021-08-05 DIAGNOSIS — E11.65: ICD-10-CM

## 2021-08-05 DIAGNOSIS — J96.11: ICD-10-CM

## 2021-08-05 DIAGNOSIS — N18.9: ICD-10-CM

## 2021-08-05 DIAGNOSIS — N17.0: ICD-10-CM

## 2021-08-05 DIAGNOSIS — L03.115: ICD-10-CM

## 2021-08-05 DIAGNOSIS — Z79.01: ICD-10-CM

## 2021-08-05 DIAGNOSIS — Z87.891: ICD-10-CM

## 2021-08-05 DIAGNOSIS — E87.6: ICD-10-CM

## 2021-08-05 DIAGNOSIS — I50.33: ICD-10-CM

## 2021-08-05 DIAGNOSIS — J44.0: ICD-10-CM

## 2021-08-05 DIAGNOSIS — M19.90: ICD-10-CM

## 2021-08-05 LAB
ALBUMIN SERPL BCP-MCNC: 3.2 G/DL (ref 3.4–5)
ALP SERPL-CCNC: 75 U/L (ref 46–116)
ALT SERPL W P-5'-P-CCNC: 13 U/L (ref 12–78)
AST SERPL W P-5'-P-CCNC: 20 U/L (ref 15–37)
BASE EXCESS BLDA CALC-SCNC: 9 MMOL/L
BASOPHILS # BLD AUTO: 0.1 K/UL (ref 0–0.2)
BASOPHILS NFR BLD AUTO: 0.7 % (ref 0–2)
BILIRUB SERPL-MCNC: 0.2 MG/DL (ref 0.2–1)
BUN SERPL-MCNC: 22 MG/DL (ref 7–18)
CALCIUM SERPL-MCNC: 9 MG/DL (ref 8.5–10.1)
CHLORIDE SERPL-SCNC: 91 MMOL/L (ref 98–107)
CO2 SERPL-SCNC: 36 MMOL/L (ref 21–32)
CREAT SERPL-MCNC: 2.3 MG/DL (ref 0.6–1.3)
CREAT SERPL-MCNC: 2.4 MG/DL (ref 0.6–1.3)
DO-HGB MFR BLDA: 45.8 MMHG
EOSINOPHIL NFR BLD AUTO: 4.4 % (ref 0–6)
GLUCOSE SERPL-MCNC: 138 MG/DL (ref 74–106)
HCT VFR BLD AUTO: 29 % (ref 33–45)
HGB BLD-MCNC: 9.4 G/DL (ref 11.5–14.8)
INHALED O2 CONCENTRATION: 21 %
LYMPHOCYTES NFR BLD AUTO: 1.8 K/UL (ref 0.8–4.8)
LYMPHOCYTES NFR BLD AUTO: 25.3 % (ref 20–44)
MAGNESIUM SERPL-MCNC: 2.8 MG/DL (ref 1.8–2.4)
MCHC RBC AUTO-ENTMCNC: 32 G/DL (ref 31–36)
MCV RBC AUTO: 90 FL (ref 82–100)
MONOCYTES NFR BLD AUTO: 0.9 K/UL (ref 0.1–1.3)
MONOCYTES NFR BLD AUTO: 12.5 % (ref 2–12)
NEUTROPHILS # BLD AUTO: 4.2 K/UL (ref 1.8–8.9)
NEUTROPHILS NFR BLD AUTO: 57.1 % (ref 43–81)
PCO2 TEMP ADJ BLDA: 56.8 MMHG (ref 35–45)
PH TEMP ADJ BLDA: 7.41 [PH] (ref 7.35–7.45)
PHOSPHATE SERPL-MCNC: 4.9 MG/DL (ref 2.5–4.9)
PLATELET # BLD AUTO: 488 K/UL (ref 150–450)
PO2 TEMP ADJ BLDA: 35.9 MMHG (ref 75–100)
POTASSIUM SERPL-SCNC: 2.7 MMOL/L (ref 3.5–5.1)
POTASSIUM SERPL-SCNC: 2.9 MMOL/L (ref 3.5–5.1)
PROT SERPL-MCNC: 7.5 G/DL (ref 6.4–8.2)
RBC # BLD AUTO: 3.25 MIL/UL (ref 4–5.2)
SAO2 % BLDA: 66.1 % (ref 92–98.5)
SODIUM SERPL-SCNC: 134 MMOL/L (ref 136–145)
VENTILATION MODE VENT: (no result)
WBC NRBC COR # BLD AUTO: 7.3 K/UL (ref 4.3–11)

## 2021-08-05 PROCEDURE — G0378 HOSPITAL OBSERVATION PER HR: HCPCS

## 2021-08-05 RX ADMIN — INSULIN HUMAN PRN UNITS: 100 INJECTION, SOLUTION PARENTERAL at 06:35

## 2021-08-05 RX ADMIN — Medication SCH EACH: at 21:12

## 2021-08-05 RX ADMIN — ATORVASTATIN CALCIUM SCH MG: 10 TABLET, FILM COATED ORAL at 21:11

## 2021-08-05 RX ADMIN — GABAPENTIN SCH MG: 300 CAPSULE ORAL at 17:00

## 2021-08-05 RX ADMIN — LINEZOLID SCH MG: 600 TABLET, FILM COATED ORAL at 21:11

## 2021-08-05 RX ADMIN — Medication SCH MG: at 15:30

## 2021-08-05 RX ADMIN — POTASSIUM CHLORIDE SCH MEQ: 1500 TABLET, EXTENDED RELEASE ORAL at 12:52

## 2021-08-05 RX ADMIN — LINEZOLID SCH MG: 600 TABLET, FILM COATED ORAL at 12:52

## 2021-08-05 RX ADMIN — EZETIMIBE SCH MG: 10 TABLET ORAL at 21:11

## 2021-08-05 RX ADMIN — APIXABAN SCH MG: 5 TABLET, FILM COATED ORAL at 09:23

## 2021-08-05 RX ADMIN — POTASSIUM CHLORIDE SCH MEQ: 1500 TABLET, EXTENDED RELEASE ORAL at 11:20

## 2021-08-05 RX ADMIN — POTASSIUM CHLORIDE SCH MEQ: 1500 TABLET, EXTENDED RELEASE ORAL at 10:59

## 2021-08-05 RX ADMIN — Medication SCH EACH: at 17:40

## 2021-08-05 RX ADMIN — BUMETANIDE SCH MG: 1 TABLET ORAL at 09:21

## 2021-08-05 RX ADMIN — MONTELUKAST SODIUM SCH MG: 10 TABLET, FILM COATED ORAL at 09:21

## 2021-08-05 RX ADMIN — POTASSIUM CHLORIDE SCH MEQ: 1500 TABLET, EXTENDED RELEASE ORAL at 14:01

## 2021-08-05 RX ADMIN — ALLOPURINOL SCH MG: 100 TABLET ORAL at 09:52

## 2021-08-05 RX ADMIN — Medication SCH MG: at 19:30

## 2021-08-05 RX ADMIN — DILTIAZEM HYDROCHLORIDE SCH MG: 240 CAPSULE, EXTENDED RELEASE ORAL at 09:51

## 2021-08-05 RX ADMIN — GABAPENTIN SCH MG: 300 CAPSULE ORAL at 14:02

## 2021-08-05 RX ADMIN — BUMETANIDE SCH MG: 1 TABLET ORAL at 17:00

## 2021-08-05 RX ADMIN — APIXABAN SCH MG: 5 TABLET, FILM COATED ORAL at 17:00

## 2021-08-05 RX ADMIN — GABAPENTIN SCH MG: 300 CAPSULE ORAL at 09:20

## 2021-08-05 RX ADMIN — POTASSIUM CHLORIDE SCH MEQ: 1500 TABLET, EXTENDED RELEASE ORAL at 09:20

## 2021-08-05 RX ADMIN — Medication SCH MG: at 23:30

## 2021-08-05 RX ADMIN — FERROUS SULFATE TAB 325 MG (65 MG ELEMENTAL FE) SCH MG: 325 (65 FE) TAB at 09:21

## 2021-08-05 RX ADMIN — MAGNESIUM OXIDE TAB 400 MG (241.3 MG ELEMENTAL MG) SCH MG: 400 (241.3 MG) TAB at 09:21

## 2021-08-05 RX ADMIN — Medication SCH EACH: at 11:54

## 2021-08-05 RX ADMIN — Medication SCH EACH: at 07:30

## 2021-08-05 NOTE — NUR
RN-CLOSED NOTES

PATIENT STILL SLEEPING WITH BREATHING EVEN AND NONLABORED ,EASILY AROUSED,NO ACUTE DISTRESS 
NOTED. ALL NEEDS ATTENDED AND ANTICIPATED. WILL ENDORSE TO INCOMING NURSED FOR CONTINUITY OF 
CARE AND MONITORING.PATIENT ON 1:1 MONITORING FOR SAFETY.

## 2021-08-05 NOTE — NUR
MS RN ADMITTING NOTES



RECEIVED PATIENT FORM GPS VIA WHEELCHAIR, PATIENT IS AWAKE, A&O X 2-3 WITH PERIODS OF OF 
FORGETFULNESS NOTED. PATIENT WAS ABLE TO AMBULATE UPON TRANSFER TO BED. VS TAKEN AND 
RECORDED AS FOLLOWS, TEMP 97.6, , RR 20, /62, O2 /62. ON ROOM AIR 
TOLERATING WELL, NO SOB, NO ACUTE DISTRESS NOTED. NOTED WITH REDNESS ON BLE. PICTURES TAKEN 
AND FILED TO CHART. STARTED AN IV ACCESS ON RIGHT HAND USING IV CATHETER G#22, PATENT AND 
FLUSHES WELL,. SAFETY PRECAUTIONS IN PLACE, BED ON LOWEST LOCKED POSITION, SIDE RAILS UP X 
2, CALL LIGHT WITHIN EASY REACH. SITTER AT BEDSIDE, PATIENT IS ON HOLD DUE TO DANGER TO SELF 
AND OTHERS. WILL CONTINUE TO MONITOR PAT'S CONDITION.

## 2021-08-05 NOTE — NUR
RN-NOTES

 PATIENT ASKING FOR ANXIETY MEDICATION,KLONOPIN 0.5MG P.O GIVEN AS PRN ORDER. WILL CONT. 
MONITORING FOR SAFETY AND BEHAVIOR.

## 2021-08-05 NOTE — NUR
RN NOTES



RECEIVED A CRITICAL POTASSIUM VALUE OF 2.7 FROM RYAN OF LAB. RELAYED TO OLU CEBALLOS, 
AWAITING ORDER.

## 2021-08-05 NOTE — NUR
MS RN CLOSING NOTES



PATIENT IN BED, ASLEEP, EASILY AWAKEN BY VERBAL AND TACTILE STIMULI, ON ROOM AIR TOLERATING 
WELL, NO SOB, NO ACUTE DISTRESS NOTED.  IV ACCESS ON RIGHT HAND  G#22, PATENT AND FLUSHES 
WELL, SAFETY PRECAUTIONS IN PLACE, BED ON LOWEST LOCKED POSITION, SIDE RAILS UP X 2, CALL 
LIGHT WITHIN EASY REACH. SITTER AT BEDSIDE,  ALL NEEDS ATTENDED AND MET. WILL CONTINUE TO 
MONITOR.

## 2021-08-05 NOTE — NUR
RN-NOTES

RECEIVED PATIENT SITTING IN BED ALERT X2, ANXIOUS AND ARGUMENTATIVE WITH THE STAFF. 
REDIRECTED AND REORIENTED PATIENT.ON 1:1 SITTER FOR SAFETY AND BEHAVIOR.

## 2021-08-05 NOTE — NUR
RN-NOTES

 PATIENT BS WAS 132MG/DL,2 UNITS OF R INSULIN NOT GIVEN DUE TO PATIENT REFUSED TO EAT LUNCH.

## 2021-08-05 NOTE — NUR
RN-NOTES

NOTED PATIENT ANGRY ,SCREAMING AND YELLING AT THE SITTER STAFF, GETTING OUT OF BED 
UNASSISTED. UNABLE TO REDIRECT PATIENT. DR. TABARES  MADE AWARE WITH T.O ORDER OF THORAZINE 
25MG IM AND ATIVAN 1MG IM ONCE. NOTED AND CARRIED OUT.

## 2021-08-05 NOTE — NUR
RN-NOTES

 PATIENT LYING IN BED AWAKE,ALERT,CALM NO ACUTE DISTRESS NOTED. ON 1:1 SITTER FOR SAFETY AND 
BEHAVIOR.

## 2021-08-05 NOTE — NUR
RN-NOTES

NOTED PATIENT VERY ANXIOUS/ANGRY STATED" I DON'T WANT TO STAY IN THE ROOM,I WANT TO STAY 
OUTSIDE".REDIRECTED PATIENT. DR. FARRELL MADE AWARE OF PATIENT'S BEHAVIOR WITH T.O ORDER TO 
CHANGE KLONOPIN 0.5MG P.O HS  TO KLONOPIN  0.5MG P.O Q6HR PRN. NOTED AND CARRIED OUT.

## 2021-08-05 NOTE — NUR
PATIENT DISCHARGE TO MEDICAL SURGICAL UNIT ACCOMPANIED BY MORIS EVANS UPON DISCHARGE PATIENT 
WAS ALERT, ORIENTED X 2- NO DISTRESS NOTED PATIENT AMBULATE WITH WALKER BELONGING SEND WITH 
PATIENT ORIGINAL HOLD SEND WITH PATIENT REPORT GIVEN TO TRACY ALLEN Sarecycline Counseling: Patient advised regarding possible photosensitivity and discoloration of the teeth, skin, lips, tongue and gums.  Patient instructed to avoid sunlight, if possible.  When exposed to sunlight, patients should wear protective clothing, sunglasses, and sunscreen.  The patient was instructed to call the office immediately if the following severe adverse effects occur:  hearing changes, easy bruising/bleeding, severe headache, or vision changes.  The patient verbalized understanding of the proper use and possible adverse effects of sarecycline.  All of the patient's questions and concerns were addressed.

## 2021-08-06 VITALS — SYSTOLIC BLOOD PRESSURE: 112 MMHG | DIASTOLIC BLOOD PRESSURE: 61 MMHG

## 2021-08-06 VITALS — SYSTOLIC BLOOD PRESSURE: 123 MMHG | DIASTOLIC BLOOD PRESSURE: 64 MMHG

## 2021-08-06 VITALS — DIASTOLIC BLOOD PRESSURE: 53 MMHG | SYSTOLIC BLOOD PRESSURE: 106 MMHG

## 2021-08-06 LAB
ALBUMIN SERPL BCP-MCNC: 3.2 G/DL (ref 3.4–5)
ALP SERPL-CCNC: 75 U/L (ref 46–116)
ALT SERPL W P-5'-P-CCNC: 15 U/L (ref 12–78)
AST SERPL W P-5'-P-CCNC: 25 U/L (ref 15–37)
BASOPHILS # BLD AUTO: 0 K/UL (ref 0–0.2)
BASOPHILS NFR BLD AUTO: 0.5 % (ref 0–2)
BILIRUB SERPL-MCNC: 0.4 MG/DL (ref 0.2–1)
BUN SERPL-MCNC: 21 MG/DL (ref 7–18)
CALCIUM SERPL-MCNC: 8.5 MG/DL (ref 8.5–10.1)
CHLORIDE SERPL-SCNC: 93 MMOL/L (ref 98–107)
CHOLEST SERPL-MCNC: 112 MG/DL (ref ?–200)
CO2 SERPL-SCNC: 36 MMOL/L (ref 21–32)
CREAT SERPL-MCNC: 2.3 MG/DL (ref 0.6–1.3)
EOSINOPHIL NFR BLD AUTO: 2.3 % (ref 0–6)
GLUCOSE SERPL-MCNC: 127 MG/DL (ref 74–106)
HCT VFR BLD AUTO: 28 % (ref 33–45)
HDLC SERPL-MCNC: 45 MG/DL (ref 40–60)
HGB BLD-MCNC: 8.9 G/DL (ref 11.5–14.8)
LDLC SERPL DIRECT ASSAY-MCNC: 45 MG/DL (ref 0–99)
LYMPHOCYTES NFR BLD AUTO: 1.5 K/UL (ref 0.8–4.8)
LYMPHOCYTES NFR BLD AUTO: 16.3 % (ref 20–44)
MAGNESIUM SERPL-MCNC: 2.9 MG/DL (ref 1.8–2.4)
MCHC RBC AUTO-ENTMCNC: 32 G/DL (ref 31–36)
MCV RBC AUTO: 90 FL (ref 82–100)
MONOCYTES NFR BLD AUTO: 1.1 K/UL (ref 0.1–1.3)
MONOCYTES NFR BLD AUTO: 11.4 % (ref 2–12)
NEUTROPHILS # BLD AUTO: 6.5 K/UL (ref 1.8–8.9)
NEUTROPHILS NFR BLD AUTO: 69.5 % (ref 43–81)
PHOSPHATE SERPL-MCNC: 3.9 MG/DL (ref 2.5–4.9)
PLATELET # BLD AUTO: 466 K/UL (ref 150–450)
POTASSIUM SERPL-SCNC: 3.1 MMOL/L (ref 3.5–5.1)
PROT SERPL-MCNC: 7.3 G/DL (ref 6.4–8.2)
RBC # BLD AUTO: 3.07 MIL/UL (ref 4–5.2)
SODIUM SERPL-SCNC: 134 MMOL/L (ref 136–145)
TRIGL SERPL-MCNC: 112 MG/DL (ref 30–150)
WBC NRBC COR # BLD AUTO: 9.4 K/UL (ref 4.3–11)

## 2021-08-06 RX ADMIN — BUMETANIDE SCH MG: 1 TABLET ORAL at 18:16

## 2021-08-06 RX ADMIN — Medication SCH EACH: at 18:17

## 2021-08-06 RX ADMIN — BUMETANIDE SCH MG: 1 TABLET ORAL at 08:32

## 2021-08-06 RX ADMIN — INSULIN HUMAN PRN UNITS: 100 INJECTION, SOLUTION PARENTERAL at 18:34

## 2021-08-06 RX ADMIN — Medication SCH EA: at 12:05

## 2021-08-06 RX ADMIN — DEXTROSE MONOHYDRATE SCH MLS/HR: 50 INJECTION, SOLUTION INTRAVENOUS at 05:07

## 2021-08-06 RX ADMIN — POTASSIUM CHLORIDE SCH MEQ: 1500 TABLET, EXTENDED RELEASE ORAL at 18:16

## 2021-08-06 RX ADMIN — GABAPENTIN SCH MG: 300 CAPSULE ORAL at 12:07

## 2021-08-06 RX ADMIN — INSULIN HUMAN PRN UNITS: 100 INJECTION, SOLUTION PARENTERAL at 12:19

## 2021-08-06 RX ADMIN — Medication SCH MG: at 03:46

## 2021-08-06 RX ADMIN — POTASSIUM CHLORIDE SCH MEQ: 1500 TABLET, EXTENDED RELEASE ORAL at 09:23

## 2021-08-06 RX ADMIN — EZETIMIBE SCH MG: 10 TABLET ORAL at 23:07

## 2021-08-06 RX ADMIN — Medication SCH EACH: at 06:58

## 2021-08-06 RX ADMIN — APIXABAN SCH MG: 5 TABLET, FILM COATED ORAL at 08:31

## 2021-08-06 RX ADMIN — QUETIAPINE FUMARATE SCH MG: 100 TABLET ORAL at 20:54

## 2021-08-06 RX ADMIN — APIXABAN SCH MG: 5 TABLET, FILM COATED ORAL at 18:17

## 2021-08-06 RX ADMIN — LINEZOLID SCH MG: 600 TABLET, FILM COATED ORAL at 20:54

## 2021-08-06 RX ADMIN — MAGNESIUM OXIDE TAB 400 MG (241.3 MG ELEMENTAL MG) SCH MG: 400 (241.3 MG) TAB at 08:32

## 2021-08-06 RX ADMIN — ALLOPURINOL SCH MG: 100 TABLET ORAL at 08:35

## 2021-08-06 RX ADMIN — ATORVASTATIN CALCIUM SCH MG: 10 TABLET, FILM COATED ORAL at 23:07

## 2021-08-06 RX ADMIN — GABAPENTIN SCH MG: 300 CAPSULE ORAL at 18:16

## 2021-08-06 RX ADMIN — Medication SCH EACH: at 12:02

## 2021-08-06 RX ADMIN — LINEZOLID SCH MG: 600 TABLET, FILM COATED ORAL at 08:32

## 2021-08-06 RX ADMIN — Medication SCH MG: at 23:30

## 2021-08-06 RX ADMIN — Medication SCH EA: at 20:52

## 2021-08-06 RX ADMIN — FERROUS SULFATE TAB 325 MG (65 MG ELEMENTAL FE) SCH MG: 325 (65 FE) TAB at 08:32

## 2021-08-06 RX ADMIN — Medication SCH MG: at 20:34

## 2021-08-06 RX ADMIN — GABAPENTIN SCH MG: 300 CAPSULE ORAL at 13:00

## 2021-08-06 RX ADMIN — Medication SCH EACH: at 22:00

## 2021-08-06 RX ADMIN — Medication SCH MG: at 15:30

## 2021-08-06 RX ADMIN — DILTIAZEM HYDROCHLORIDE SCH MG: 240 CAPSULE, EXTENDED RELEASE ORAL at 08:30

## 2021-08-06 RX ADMIN — MONTELUKAST SODIUM SCH MG: 10 TABLET, FILM COATED ORAL at 08:32

## 2021-08-06 RX ADMIN — Medication SCH MG: at 07:59

## 2021-08-06 RX ADMIN — Medication SCH MG: at 11:28

## 2021-08-06 RX ADMIN — GABAPENTIN SCH MG: 300 CAPSULE ORAL at 08:32

## 2021-08-06 NOTE — NUR
MS RN OPENING NOTES:



RECEIVED PATIENT SLEEP IN JOCE CHAIR, AROUSABLE TO STIMULI, A/OX 3,  NO COMPAIN OF PAIN AND 
DISCOMFORT AT THIS TIME, WITH 1:1 SITTER , PATIETN APPEARS CALM, PATIENT KEPT CLEAN AND DRY, 
WILL CONTINUE TO MONITOR.

## 2021-08-06 NOTE — NUR
RN Opening Notes:

Received pt. awake in the commode with oxygen via nasal cannula with sitter at the side. Pt. 
is irritable and disorganized. No sign of distress and no agitation noted. Will continue to 
monitor for safety.

## 2021-08-06 NOTE — NUR
Closing Notes.

Pt. is awke at this time, with ongoing oxygen. Unable to do MRI of the brain due to pt. is 
restless irritable and not following direction. Dr. barnes made aware.

## 2021-08-06 NOTE — NUR
MS RN NOTES



AWAKE & RESPONSIVE. NOT IN ANY DISTRESS. NO SOB NOTED. DENIES ANY PAIN OR DISCOMFORT AT THIS 
TIME. WITH IV-HL PATENT & INTACT. MONITORED ACCORDINGLY WITH SITTER AT BEDSIDE. CALL LIGHT 
WITHIN REACH. BED IN LOWEST POSITION. SR UP X 3 WITH BED ALARM ON FOR SAFETY. WILL ENDORSE 
TO NEXT SHIFT.

## 2021-08-07 ENCOUNTER — HOSPITAL ENCOUNTER (INPATIENT)
Dept: HOSPITAL 54 - GPS | Age: 76
LOS: 3 days | Discharge: NURSING, REHAB OR LONG-TERM CARE | DRG: 885 | End: 2021-08-10
Attending: NURSE PRACTITIONER | Admitting: PSYCHIATRY & NEUROLOGY
Payer: MEDICARE

## 2021-08-07 VITALS — HEIGHT: 65 IN | WEIGHT: 218 LBS | BODY MASS INDEX: 36.32 KG/M2

## 2021-08-07 VITALS — SYSTOLIC BLOOD PRESSURE: 99 MMHG | DIASTOLIC BLOOD PRESSURE: 50 MMHG

## 2021-08-07 VITALS — DIASTOLIC BLOOD PRESSURE: 74 MMHG | SYSTOLIC BLOOD PRESSURE: 128 MMHG

## 2021-08-07 VITALS — DIASTOLIC BLOOD PRESSURE: 66 MMHG | SYSTOLIC BLOOD PRESSURE: 128 MMHG

## 2021-08-07 VITALS — SYSTOLIC BLOOD PRESSURE: 119 MMHG | DIASTOLIC BLOOD PRESSURE: 63 MMHG

## 2021-08-07 DIAGNOSIS — J44.9: ICD-10-CM

## 2021-08-07 DIAGNOSIS — G47.00: ICD-10-CM

## 2021-08-07 DIAGNOSIS — F29: ICD-10-CM

## 2021-08-07 DIAGNOSIS — Z73.6: ICD-10-CM

## 2021-08-07 DIAGNOSIS — Z98.1: ICD-10-CM

## 2021-08-07 DIAGNOSIS — M19.90: ICD-10-CM

## 2021-08-07 DIAGNOSIS — I87.8: ICD-10-CM

## 2021-08-07 DIAGNOSIS — I13.0: ICD-10-CM

## 2021-08-07 DIAGNOSIS — Z90.710: ICD-10-CM

## 2021-08-07 DIAGNOSIS — E11.65: ICD-10-CM

## 2021-08-07 DIAGNOSIS — F39: Primary | ICD-10-CM

## 2021-08-07 DIAGNOSIS — E11.22: ICD-10-CM

## 2021-08-07 DIAGNOSIS — I25.10: ICD-10-CM

## 2021-08-07 DIAGNOSIS — F03.90: ICD-10-CM

## 2021-08-07 DIAGNOSIS — N17.0: ICD-10-CM

## 2021-08-07 DIAGNOSIS — Z79.01: ICD-10-CM

## 2021-08-07 DIAGNOSIS — E78.5: ICD-10-CM

## 2021-08-07 DIAGNOSIS — F32.9: ICD-10-CM

## 2021-08-07 DIAGNOSIS — N18.9: ICD-10-CM

## 2021-08-07 DIAGNOSIS — E87.6: ICD-10-CM

## 2021-08-07 DIAGNOSIS — E66.01: ICD-10-CM

## 2021-08-07 DIAGNOSIS — G93.40: ICD-10-CM

## 2021-08-07 DIAGNOSIS — I50.32: ICD-10-CM

## 2021-08-07 DIAGNOSIS — L03.116: ICD-10-CM

## 2021-08-07 DIAGNOSIS — F41.9: ICD-10-CM

## 2021-08-07 DIAGNOSIS — L03.115: ICD-10-CM

## 2021-08-07 DIAGNOSIS — M79.7: ICD-10-CM

## 2021-08-07 DIAGNOSIS — D63.8: ICD-10-CM

## 2021-08-07 DIAGNOSIS — I48.91: ICD-10-CM

## 2021-08-07 LAB
BASOPHILS # BLD AUTO: 0 K/UL (ref 0–0.2)
BASOPHILS NFR BLD AUTO: 0.6 % (ref 0–2)
BUN SERPL-MCNC: 20 MG/DL (ref 7–18)
CALCIUM SERPL-MCNC: 8.9 MG/DL (ref 8.5–10.1)
CHLORIDE SERPL-SCNC: 94 MMOL/L (ref 98–107)
CO2 SERPL-SCNC: 37 MMOL/L (ref 21–32)
CREAT SERPL-MCNC: 1.8 MG/DL (ref 0.6–1.3)
EOSINOPHIL NFR BLD AUTO: 1.6 % (ref 0–6)
GLUCOSE SERPL-MCNC: 130 MG/DL (ref 74–106)
HCT VFR BLD AUTO: 28 % (ref 33–45)
HGB BLD-MCNC: 8.8 G/DL (ref 11.5–14.8)
LYMPHOCYTES NFR BLD AUTO: 1.3 K/UL (ref 0.8–4.8)
LYMPHOCYTES NFR BLD AUTO: 17.8 % (ref 20–44)
MAGNESIUM SERPL-MCNC: 3.2 MG/DL (ref 1.8–2.4)
MCHC RBC AUTO-ENTMCNC: 32 G/DL (ref 31–36)
MCV RBC AUTO: 91 FL (ref 82–100)
MONOCYTES NFR BLD AUTO: 0.8 K/UL (ref 0.1–1.3)
MONOCYTES NFR BLD AUTO: 11 % (ref 2–12)
NEUTROPHILS # BLD AUTO: 5 K/UL (ref 1.8–8.9)
NEUTROPHILS NFR BLD AUTO: 69 % (ref 43–81)
PHOSPHATE SERPL-MCNC: 4.5 MG/DL (ref 2.5–4.9)
PLATELET # BLD AUTO: 403 K/UL (ref 150–450)
POTASSIUM SERPL-SCNC: 3.5 MMOL/L (ref 3.5–5.1)
RBC # BLD AUTO: 3.07 MIL/UL (ref 4–5.2)
SODIUM SERPL-SCNC: 137 MMOL/L (ref 136–145)
WBC NRBC COR # BLD AUTO: 7.3 K/UL (ref 4.3–11)

## 2021-08-07 RX ADMIN — ALLOPURINOL SCH MG: 100 TABLET ORAL at 08:43

## 2021-08-07 RX ADMIN — LINEZOLID SCH MG: 600 TABLET, FILM COATED ORAL at 08:45

## 2021-08-07 RX ADMIN — APIXABAN SCH MG: 5 TABLET, FILM COATED ORAL at 08:45

## 2021-08-07 RX ADMIN — BUMETANIDE SCH MG: 1 TABLET ORAL at 17:00

## 2021-08-07 RX ADMIN — DEXTROSE MONOHYDRATE SCH MLS/HR: 50 INJECTION, SOLUTION INTRAVENOUS at 05:09

## 2021-08-07 RX ADMIN — Medication SCH EA: at 17:43

## 2021-08-07 RX ADMIN — Medication SCH MG: at 12:16

## 2021-08-07 RX ADMIN — POTASSIUM CHLORIDE SCH MEQ: 1500 TABLET, EXTENDED RELEASE ORAL at 17:00

## 2021-08-07 RX ADMIN — BUMETANIDE SCH MG: 1 TABLET ORAL at 08:45

## 2021-08-07 RX ADMIN — Medication SCH EA: at 09:44

## 2021-08-07 RX ADMIN — GABAPENTIN SCH MG: 300 CAPSULE ORAL at 12:47

## 2021-08-07 RX ADMIN — MAGNESIUM OXIDE TAB 400 MG (241.3 MG ELEMENTAL MG) SCH MG: 400 (241.3 MG) TAB at 08:45

## 2021-08-07 RX ADMIN — FERROUS SULFATE TAB 325 MG (65 MG ELEMENTAL FE) SCH MG: 325 (65 FE) TAB at 08:43

## 2021-08-07 RX ADMIN — Medication SCH EACH: at 17:45

## 2021-08-07 RX ADMIN — Medication SCH MG: at 15:40

## 2021-08-07 RX ADMIN — Medication SCH MG: at 07:56

## 2021-08-07 RX ADMIN — Medication SCH EACH: at 07:30

## 2021-08-07 RX ADMIN — APIXABAN SCH MG: 5 TABLET, FILM COATED ORAL at 17:00

## 2021-08-07 RX ADMIN — GABAPENTIN SCH MG: 300 CAPSULE ORAL at 17:00

## 2021-08-07 RX ADMIN — POTASSIUM CHLORIDE SCH MEQ: 1500 TABLET, EXTENDED RELEASE ORAL at 08:43

## 2021-08-07 RX ADMIN — GABAPENTIN SCH MG: 300 CAPSULE ORAL at 08:42

## 2021-08-07 RX ADMIN — Medication SCH MG: at 19:29

## 2021-08-07 RX ADMIN — Medication SCH EACH: at 12:31

## 2021-08-07 RX ADMIN — DILTIAZEM HYDROCHLORIDE SCH MG: 240 CAPSULE, EXTENDED RELEASE ORAL at 09:00

## 2021-08-07 RX ADMIN — MONTELUKAST SODIUM SCH MG: 10 TABLET, FILM COATED ORAL at 08:42

## 2021-08-07 RX ADMIN — QUETIAPINE FUMARATE SCH MG: 100 TABLET ORAL at 08:44

## 2021-08-07 RX ADMIN — Medication SCH MG: at 03:30

## 2021-08-07 NOTE — NUR
MS RN CLOSING NOTE: 





PATIENT SLEEP IN BED COMFORTABLY, BED IN LOW POSITION, CALL LIGHTS WITHIN REACH, NO COMPLAIN 
OF PAIN AND DISCOMFORT AT THIS TIME, PATIENT IS A/OX2-3 AMBULATORY WITH ASSITANCE, WITH IV 
LINE AT RFA #20 INFUSING WELL,  WITH !;! SITTER , PATIENT KEPT CLEAN AND DRY, ALL NEEDS MET, 
WILL ENDORSE TO INCOMING SHIFT.

## 2021-08-07 NOTE — NUR
MS/RN DISCHARGE NOTES



PATIENT MEDICALLY STABLE AND MD ORDERED DC AND TRANSFER TO Psychiatric UNIT. PATIENT ALERT AND 
ORIENTEDX1, ON O2 AT 1LPM WITH O2 SAT AT 95%. BEDBOUND. REPORTS GIVEN TO TIFFANY JONES AT 
Psychiatric.

## 2021-08-07 NOTE — NUR
ADMISSION NOTES:

ADMITTED THIS 76Y/O FEMALE PATIENT  ADMIT FROM [facility]  MED SURG/ INTIALLY FROM HOME ,  ADMITTED 
TO GPS ON 5250 HOLD, PER HOLD DTS  GRAVELY DISABLE,PER HOLD  ,UPON FACE TO FACE ASSESSMENT 
PATIENT IS A&O X , 2,3 ANXIOUS  ,DISHELVED ,EASILY GETS AGITATED, DISORGNIZED,  DENIES SI 
/HI AT THIS TIME, PT. IS POOR HISTORIAN, POOR INSIGHT ,POOR JUDGEMENT , PT. REFUSED TO  
SIGNS ADMISSION CONSENT PAPERS ,  DUE TO MENTAL STATUS , PT. REFUSED INTIALLY , BOTH MD 
AWARE AND NOTIFIED OF THE ADMISSION,  BELONGINGS CONTRABAND WERE DONE , NURSING ASSESSMENT 
DONE ,PT. RIGHTS DISCUSS BY WRITER , PROVIDE THE PT. WITH HANDBOOK, AND MEDICATIONS GUIDE, 
ENVIRONMENTAL SAFETY CHECK DONE,  ENCOURAGED PT. VERBALIZED ANY FEELING CONCERN TO STAFF, 
ORIENT TO UNIT POLICY, NO ACUTE DISTRESS NOTED,VITAL SIGNS  WNL ,DENIES ANY PAIN  AT THIS 
TIME,WILL CONTINUE TO MONITOR FOR Q15 SAFETY AND BEHAVIOR.

## 2021-08-07 NOTE — NUR
MS/RN OPENING NOTE: 



RECEIVED PATIENT IN BED, AWAKE, ALERT AND ORIENTEDX1-2. ON OXYGEN AT 3LPM VIA NASAL CANNULA. 
NO SOB NOTED. NO COMPLAIN OF PAIN AND DISCOMFORT AT THIS TIME. WITH IV ACCESS AT RFA #20G 
INFUSING WELL,  WITH A SITTER. SAFETY PRECAUTIONS IN PLACED. BED LOCKED ON LOWEST POSITION, 
DISE RAILS UPX2, CALL LIGHT AND TABLE WITHIN EASY REACH. WILL CONTINUE TO MONITOR PATIENT.

## 2021-08-08 VITALS — SYSTOLIC BLOOD PRESSURE: 115 MMHG | DIASTOLIC BLOOD PRESSURE: 65 MMHG

## 2021-08-08 VITALS — DIASTOLIC BLOOD PRESSURE: 76 MMHG | SYSTOLIC BLOOD PRESSURE: 111 MMHG

## 2021-08-08 VITALS — DIASTOLIC BLOOD PRESSURE: 70 MMHG | SYSTOLIC BLOOD PRESSURE: 121 MMHG

## 2021-08-08 VITALS — DIASTOLIC BLOOD PRESSURE: 75 MMHG | SYSTOLIC BLOOD PRESSURE: 105 MMHG

## 2021-08-08 VITALS — DIASTOLIC BLOOD PRESSURE: 60 MMHG | SYSTOLIC BLOOD PRESSURE: 100 MMHG

## 2021-08-08 VITALS — SYSTOLIC BLOOD PRESSURE: 113 MMHG | DIASTOLIC BLOOD PRESSURE: 68 MMHG

## 2021-08-08 LAB
ALBUMIN SERPL BCP-MCNC: 2.9 G/DL (ref 3.4–5)
ALP SERPL-CCNC: 79 U/L (ref 46–116)
ALT SERPL W P-5'-P-CCNC: 9 U/L (ref 12–78)
AST SERPL W P-5'-P-CCNC: 28 U/L (ref 15–37)
BILIRUB SERPL-MCNC: 0.4 MG/DL (ref 0.2–1)
BUN SERPL-MCNC: 16 MG/DL (ref 7–18)
CALCIUM SERPL-MCNC: 9.2 MG/DL (ref 8.5–10.1)
CHLORIDE SERPL-SCNC: 100 MMOL/L (ref 98–107)
CHOLEST SERPL-MCNC: 110 MG/DL (ref ?–200)
CO2 SERPL-SCNC: 36 MMOL/L (ref 21–32)
CREAT SERPL-MCNC: 1.5 MG/DL (ref 0.6–1.3)
GLUCOSE SERPL-MCNC: 103 MG/DL (ref 74–106)
HDLC SERPL-MCNC: 51 MG/DL (ref 40–60)
LDLC SERPL DIRECT ASSAY-MCNC: 42 MG/DL (ref 0–99)
POTASSIUM SERPL-SCNC: 3.8 MMOL/L (ref 3.5–5.1)
PROT SERPL-MCNC: 7.3 G/DL (ref 6.4–8.2)
SODIUM SERPL-SCNC: 140 MMOL/L (ref 136–145)
TRIGL SERPL-MCNC: 79 MG/DL (ref 30–150)

## 2021-08-08 RX ADMIN — Medication SCH OZ: at 09:00

## 2021-08-08 RX ADMIN — Medication SCH EA: at 12:01

## 2021-08-08 RX ADMIN — EZETIMIBE SCH MG: 10 TABLET ORAL at 21:09

## 2021-08-08 RX ADMIN — APIXABAN SCH MG: 5 TABLET, FILM COATED ORAL at 11:00

## 2021-08-08 RX ADMIN — Medication SCH EACH: at 21:56

## 2021-08-08 RX ADMIN — QUETIAPINE FUMARATE SCH MG: 100 TABLET ORAL at 13:21

## 2021-08-08 RX ADMIN — LINEZOLID SCH MG: 600 TABLET, FILM COATED ORAL at 21:09

## 2021-08-08 RX ADMIN — Medication SCH EACH: at 12:00

## 2021-08-08 RX ADMIN — ATORVASTATIN CALCIUM SCH MG: 10 TABLET, FILM COATED ORAL at 21:10

## 2021-08-08 RX ADMIN — APIXABAN SCH MG: 5 TABLET, FILM COATED ORAL at 21:54

## 2021-08-08 RX ADMIN — Medication SCH EACH: at 17:39

## 2021-08-08 RX ADMIN — QUETIAPINE FUMARATE SCH MG: 100 TABLET ORAL at 21:09

## 2021-08-08 RX ADMIN — Medication SCH EA: at 20:06

## 2021-08-08 RX ADMIN — INSULIN HUMAN PRN UNITS: 100 INJECTION, SOLUTION PARENTERAL at 21:56

## 2021-08-08 RX ADMIN — LORAZEPAM PRN MG: 0.5 TABLET ORAL at 16:18

## 2021-08-08 NOTE — NUR
GPS RN  NOTE:

PATIENT IS CURRENTLY SLEEPING COMFORTABLY IN BED. PATIENT SLEPT 9 HOURS THIS SHIFT. NO S/S 
OF DISTRESS, PT.ON O2 - 2L-NC. VITAL SIGNS WNL , BED IN LOWEST POSITION AND LOCKED, SIDE 
RAILS UP X2. ALL PATIENT CARE NEEDS HAVE BEEN MET AT THIS TIME. WILL CONTINUE TO MONITOR AND 
ENDORSE TO AM SHIFT.

## 2021-08-09 VITALS — SYSTOLIC BLOOD PRESSURE: 134 MMHG | DIASTOLIC BLOOD PRESSURE: 66 MMHG

## 2021-08-09 VITALS — DIASTOLIC BLOOD PRESSURE: 63 MMHG | SYSTOLIC BLOOD PRESSURE: 138 MMHG

## 2021-08-09 VITALS — SYSTOLIC BLOOD PRESSURE: 131 MMHG | DIASTOLIC BLOOD PRESSURE: 70 MMHG

## 2021-08-09 RX ADMIN — Medication SCH OZ: at 09:05

## 2021-08-09 RX ADMIN — LORAZEPAM PRN MG: 0.5 TABLET ORAL at 02:28

## 2021-08-09 RX ADMIN — MONTELUKAST SODIUM SCH MG: 10 TABLET, FILM COATED ORAL at 09:04

## 2021-08-09 RX ADMIN — LINEZOLID SCH MG: 600 TABLET, FILM COATED ORAL at 21:13

## 2021-08-09 RX ADMIN — DILTIAZEM HYDROCHLORIDE SCH MG: 240 CAPSULE, EXTENDED RELEASE ORAL at 09:04

## 2021-08-09 RX ADMIN — INSULIN HUMAN PRN UNITS: 100 INJECTION, SOLUTION PARENTERAL at 17:02

## 2021-08-09 RX ADMIN — Medication SCH EACH: at 11:49

## 2021-08-09 RX ADMIN — Medication SCH EACH: at 16:52

## 2021-08-09 RX ADMIN — MAGNESIUM OXIDE TAB 400 MG (241.3 MG ELEMENTAL MG) SCH MG: 400 (241.3 MG) TAB at 09:04

## 2021-08-09 RX ADMIN — QUETIAPINE FUMARATE SCH MG: 100 TABLET ORAL at 08:59

## 2021-08-09 RX ADMIN — APIXABAN SCH MG: 5 TABLET, FILM COATED ORAL at 21:15

## 2021-08-09 RX ADMIN — QUETIAPINE FUMARATE SCH MG: 100 TABLET ORAL at 21:13

## 2021-08-09 RX ADMIN — ATORVASTATIN CALCIUM SCH MG: 10 TABLET, FILM COATED ORAL at 21:13

## 2021-08-09 RX ADMIN — Medication SCH EACH: at 07:18

## 2021-08-09 RX ADMIN — EZETIMIBE SCH MG: 10 TABLET ORAL at 21:13

## 2021-08-09 RX ADMIN — INSULIN HUMAN PRN UNITS: 100 INJECTION, SOLUTION PARENTERAL at 07:18

## 2021-08-09 RX ADMIN — LORAZEPAM PRN MG: 0.5 TABLET ORAL at 15:27

## 2021-08-09 RX ADMIN — APIXABAN SCH MG: 5 TABLET, FILM COATED ORAL at 09:05

## 2021-08-09 RX ADMIN — ALLOPURINOL SCH MG: 100 TABLET ORAL at 09:04

## 2021-08-09 RX ADMIN — FERROUS SULFATE TAB 325 MG (65 MG ELEMENTAL FE) SCH MG: 325 (65 FE) TAB at 09:04

## 2021-08-09 RX ADMIN — LINEZOLID SCH MG: 600 TABLET, FILM COATED ORAL at 09:04

## 2021-08-09 RX ADMIN — Medication SCH EACH: at 21:43

## 2021-08-09 RX ADMIN — LORAZEPAM PRN MG: 0.5 TABLET ORAL at 09:03

## 2021-08-09 RX ADMIN — Medication SCH EA: at 09:23

## 2021-08-09 RX ADMIN — Medication SCH EA: at 16:52

## 2021-08-09 NOTE — NUR
Probable cause hearing: Pt.'s 5250 hold was upheld on the grounds of gravely disabled and 
Danger to Self.

## 2021-08-09 NOTE — NUR
RN NOTE- PATIENT IS CURRENTLY ANXIOUS. PT.ON O2 - 2L-NC. VITAL SIGNS WNL , BED IN LOWEST 
POSITION AND LOCKED, SIDE RAILS UP X2. ALL PATIENT CARE NEEDS HAVE BEEN MET AT THIS TIME. 
WILL CONTINUE TO MONITOR AND ENDORSE TO AM SHIFT.

## 2021-08-09 NOTE — NUR
GPS-RN NOTES:



PATIENT IS VERY ANXIOUS AND WITH EPISODES OF YELLING AND SCREAMING. PRN ATIVAN 1MG PO GIVEN. 
WILL CONTINUE TO MONITOR.

## 2021-08-09 NOTE — NUR
Point of Contact: 



The pt.'s , Sami Ward [phone] was called however, call went to voicemail and 
SW left call back number. The pt.'s daughter, Dr.Lilian Daijose [phone] was called to 
provide collateral information. Rain is requesting [facility]. SW notified her that 
Dr. Barnes is recommending that pt. go to a SNF as pt. needs higher level of care. Rain 
stated that the SNF that she has had to deal with the past couple of months are horrible and 
she would like for SW to refer pt. to memory care [facility]. Noted.

## 2021-08-09 NOTE — NUR
RN OPENING NOTE- 



PATIENT IS CURRENTLY IN GERICHAIR IN NO APPARENT DISTRESS. PATIENT OCCASIONALLY HAS OUTBURST 
OF YELLING. PT.ON O2 - 2L-NC. IN NO APPARENT PHYSICAL DISTRESS, BREATHING EVEN AND 
UNLABORED. BED IN LOWEST POSITION AND LOCKED, SIDE RAILS UP X2. WILL CONT TO MONITOR MOOD 
AND BEHAVIOR.  STEVE JUST RECENTLY LEFT UNIT AFTER VISITING PER REPORT FROM DAY SHIFT 
CASE MANAGEMENT IS WORKING ON DISCHARGE POSSIBLY TO HAPPEN TOMORROW MOST LIKELY TO senior care. WILL 
CONTINUE TO MONITOR MOOD AND BEHAVIOR AND TO ENSURE SAFETY.

## 2021-08-09 NOTE — NUR
D/C planning:

EWELINA faxed clinicals to [facility] FAX: [phone]; [facility] FAX: [phone]; [facility] fax: [phone]; [facility] FAX:[phone]

## 2021-08-10 VITALS — SYSTOLIC BLOOD PRESSURE: 148 MMHG | DIASTOLIC BLOOD PRESSURE: 96 MMHG

## 2021-08-10 VITALS — SYSTOLIC BLOOD PRESSURE: 142 MMHG | DIASTOLIC BLOOD PRESSURE: 66 MMHG

## 2021-08-10 LAB
BASOPHILS # BLD AUTO: 0.1 K/UL (ref 0–0.2)
BASOPHILS NFR BLD AUTO: 0.8 % (ref 0–2)
BUN SERPL-MCNC: 13 MG/DL (ref 7–18)
CALCIUM SERPL-MCNC: 9.9 MG/DL (ref 8.5–10.1)
CHLORIDE SERPL-SCNC: 98 MMOL/L (ref 98–107)
CO2 SERPL-SCNC: 35 MMOL/L (ref 21–32)
CREAT SERPL-MCNC: 1.4 MG/DL (ref 0.6–1.3)
EOSINOPHIL NFR BLD AUTO: 5.9 % (ref 0–6)
GLUCOSE SERPL-MCNC: 117 MG/DL (ref 74–106)
HCT VFR BLD AUTO: 33 % (ref 33–45)
HGB BLD-MCNC: 10.4 G/DL (ref 11.5–14.8)
LYMPHOCYTES NFR BLD AUTO: 2.9 K/UL (ref 0.8–4.8)
LYMPHOCYTES NFR BLD AUTO: 39.2 % (ref 20–44)
MAGNESIUM SERPL-MCNC: 2.9 MG/DL (ref 1.8–2.4)
MCHC RBC AUTO-ENTMCNC: 31 G/DL (ref 31–36)
MCV RBC AUTO: 89 FL (ref 82–100)
MONOCYTES NFR BLD AUTO: 0.7 K/UL (ref 0.1–1.3)
MONOCYTES NFR BLD AUTO: 9.9 % (ref 2–12)
NEUTROPHILS # BLD AUTO: 3.2 K/UL (ref 1.8–8.9)
NEUTROPHILS NFR BLD AUTO: 44.2 % (ref 43–81)
PHOSPHATE SERPL-MCNC: 3.9 MG/DL (ref 2.5–4.9)
PLATELET # BLD AUTO: 550 K/UL (ref 150–450)
POTASSIUM SERPL-SCNC: 3.5 MMOL/L (ref 3.5–5.1)
RBC # BLD AUTO: 3.74 MIL/UL (ref 4–5.2)
SODIUM SERPL-SCNC: 139 MMOL/L (ref 136–145)
WBC NRBC COR # BLD AUTO: 7.3 K/UL (ref 4.3–11)

## 2021-08-10 RX ADMIN — QUETIAPINE FUMARATE SCH MG: 100 TABLET ORAL at 08:05

## 2021-08-10 RX ADMIN — APIXABAN SCH MG: 5 TABLET, FILM COATED ORAL at 08:07

## 2021-08-10 RX ADMIN — DILTIAZEM HYDROCHLORIDE SCH MG: 240 CAPSULE, EXTENDED RELEASE ORAL at 08:05

## 2021-08-10 RX ADMIN — FERROUS SULFATE TAB 325 MG (65 MG ELEMENTAL FE) SCH MG: 325 (65 FE) TAB at 08:05

## 2021-08-10 RX ADMIN — Medication SCH EACH: at 07:40

## 2021-08-10 RX ADMIN — MONTELUKAST SODIUM SCH MG: 10 TABLET, FILM COATED ORAL at 08:05

## 2021-08-10 RX ADMIN — ALLOPURINOL SCH MG: 100 TABLET ORAL at 08:05

## 2021-08-10 RX ADMIN — LINEZOLID SCH MG: 600 TABLET, FILM COATED ORAL at 08:05

## 2021-08-10 RX ADMIN — MAGNESIUM OXIDE TAB 400 MG (241.3 MG ELEMENTAL MG) SCH MG: 400 (241.3 MG) TAB at 08:05

## 2021-08-10 RX ADMIN — Medication SCH EACH: at 11:58

## 2021-08-10 RX ADMIN — LORAZEPAM PRN MG: 0.5 TABLET ORAL at 07:02

## 2021-08-10 NOTE — NUR
note: 



Pt is a 75-year-old,  female. Pt was previously living at home with her spouse, 
Sami Ward ([phone]/work: [phone])  and her daughter, Rain Ward 
([phone]) at [address] [place] [address]. 



Per EMR, pt was initially at the geropsych unit on 08/02/21 and was transferred to the 
med-surg unit on 08/05/21. Pt was readmitted to the geropsych unit once she was medically 
cleared on 08/07/21. Pt has a history of psychiatric illness. Pt was brought in by pts 
spouse, Sami Ward for attempting to harm herself by overdosing. 



SW assessed pt on the geropsych unit. Pt presented with depressed mood and flat affect. Pt 
was yelling and screaming. Pt was unable to provide SW with adequate history as the pt 
presents with a disorganized thought process and is confused. Her presentation is that of 
dementia with a high level of agitation and possible psychosis.



SW discussed discharge plans with the pts daughter, Rain. Pts daughter, Rain was 
[facility]. SW notified pts daughter of Dr. Srinivasan recommendations for 
SNF as the pt requires a higher level of care at this time. Pts daughter was receptive to 
SNF if assisted living options were unavailable. SS faxed clinicals to SNFs on 08/09/21.  
Per Shane request, SW faxed Form [phone] for assisted living admission to [facility] ([phone]), [facility] ([phone]), and [facility] ([phone]). 



SW will continue to work on the pts discharge plan to SNF versus Assisted Living.

## 2021-08-10 NOTE — NUR
D/C planning:

Pt was accepted at [facility] FAX: [phone], [facility] 
FAX:[phone], [facility] FAX: [phone], and [facility] FAX: [phone].

## 2021-08-10 NOTE — NUR
Discharge Plan:



Pt will be discharged to [facility] (SNF) located at [address] [place] [address]; ([phone]). Pt will be transported via [facility]. Pts daughter, 
Rain ([phone]) consented to transfer to [facility] after she was educated about the 
patients need for a higher level of care. SW informed the daughter, Rain of this 
discharge. At this time, pt presents with a depressed mood and flat affect. Pt is 
cooperative. Pt denies both suicidal and homicidal ideation as well as auditory and visual 
hallucinations. Pt will continue to be under the care of her psychiatrist, Dr. Gonsales 
located at [address] [address]; [phone] and her 
internist, Dr. Razo, located at [address] [place] [address]; [phone].

## 2021-08-10 NOTE — NUR
WOUND CARE CONSULT: UNABLE TO DO SKIN ASSESSMENT DUE TO PT SCREAMING AND AGITATED AT THIS 
TIME. PER RN, PT BECOMES COMBATIVE. REVIEWED CHART, NURSING DOCUMENTATION AND PHOTOS WHICH 
INDICATE REDNESS/RASH TO SKIN FOLDS AND BUTTOCKS. RECOMMENDATIONS MADE FOR SKIN PROTECTION. 
DISCUSSED WITH NURSING STAFF. MD IN AGREEMENT WITH PLAN OF CARE.

## 2021-09-21 ENCOUNTER — HOSPITAL ENCOUNTER (INPATIENT)
Dept: HOSPITAL 54 - ER | Age: 76
LOS: 2 days | Discharge: NURSING, REHAB OR LONG-TERM CARE | DRG: 690 | End: 2021-09-23
Attending: INTERNAL MEDICINE | Admitting: INTERNAL MEDICINE
Payer: MEDICARE

## 2021-09-21 VITALS — BODY MASS INDEX: 33.16 KG/M2 | WEIGHT: 199.02 LBS | HEIGHT: 65 IN

## 2021-09-21 VITALS — SYSTOLIC BLOOD PRESSURE: 120 MMHG | DIASTOLIC BLOOD PRESSURE: 60 MMHG

## 2021-09-21 VITALS — DIASTOLIC BLOOD PRESSURE: 57 MMHG | SYSTOLIC BLOOD PRESSURE: 105 MMHG

## 2021-09-21 DIAGNOSIS — E78.5: ICD-10-CM

## 2021-09-21 DIAGNOSIS — E11.40: ICD-10-CM

## 2021-09-21 DIAGNOSIS — M79.7: ICD-10-CM

## 2021-09-21 DIAGNOSIS — N13.30: ICD-10-CM

## 2021-09-21 DIAGNOSIS — S51.802A: ICD-10-CM

## 2021-09-21 DIAGNOSIS — F17.210: ICD-10-CM

## 2021-09-21 DIAGNOSIS — E53.8: ICD-10-CM
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
## 2021-09-21 DIAGNOSIS — E87.1: ICD-10-CM

## 2021-09-21 DIAGNOSIS — Z79.01: ICD-10-CM

## 2021-09-21 DIAGNOSIS — R59.0: ICD-10-CM

## 2021-09-21 DIAGNOSIS — D72.829: ICD-10-CM

## 2021-09-21 DIAGNOSIS — G89.29: ICD-10-CM
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
## 2021-09-21 DIAGNOSIS — F41.9: ICD-10-CM

## 2021-09-21 DIAGNOSIS — D64.9: ICD-10-CM

## 2021-09-21 DIAGNOSIS — Z90.710: ICD-10-CM

## 2021-09-21 DIAGNOSIS — F03.90: ICD-10-CM

## 2021-09-21 DIAGNOSIS — E88.09: ICD-10-CM

## 2021-09-21 DIAGNOSIS — N39.0: Primary | ICD-10-CM

## 2021-09-21 DIAGNOSIS — D50.9: ICD-10-CM

## 2021-09-21 DIAGNOSIS — J44.9: ICD-10-CM

## 2021-09-21 DIAGNOSIS — N18.9: ICD-10-CM

## 2021-09-21 DIAGNOSIS — F39: ICD-10-CM

## 2021-09-21 DIAGNOSIS — Z79.84: ICD-10-CM

## 2021-09-21 DIAGNOSIS — D72.821: ICD-10-CM

## 2021-09-21 DIAGNOSIS — I48.91: ICD-10-CM

## 2021-09-21 DIAGNOSIS — E83.9: ICD-10-CM

## 2021-09-21 DIAGNOSIS — E11.22: ICD-10-CM

## 2021-09-21 DIAGNOSIS — I13.0: ICD-10-CM

## 2021-09-21 DIAGNOSIS — I50.9: ICD-10-CM

## 2021-09-21 DIAGNOSIS — F32.9: ICD-10-CM
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
## 2021-09-21 DIAGNOSIS — Z20.822: ICD-10-CM

## 2021-09-21 DIAGNOSIS — I25.10: ICD-10-CM

## 2021-09-21 DIAGNOSIS — M89.9: ICD-10-CM

## 2021-09-21 DIAGNOSIS — E03.9: ICD-10-CM

## 2021-09-21 DIAGNOSIS — D47.3: ICD-10-CM

## 2021-09-21 LAB
ALBUMIN SERPL BCP-MCNC: 2.2 G/DL (ref 3.4–5)
ALP SERPL-CCNC: 139 U/L (ref 46–116)
ALT SERPL W P-5'-P-CCNC: 64 U/L (ref 12–78)
AST SERPL W P-5'-P-CCNC: 94 U/L (ref 15–37)
BASOPHILS # BLD AUTO: 0.1 K/UL (ref 0–0.2)
BASOPHILS # BLD AUTO: 0.2 K/UL (ref 0–0.2)
BASOPHILS NFR BLD AUTO: 0.6 % (ref 0–2)
BASOPHILS NFR BLD AUTO: 1.4 % (ref 0–2)
BILIRUB DIRECT SERPL-MCNC: 0.1 MG/DL (ref 0–0.2)
BILIRUB SERPL-MCNC: 0.3 MG/DL (ref 0.2–1)
BUN SERPL-MCNC: 15 MG/DL (ref 7–18)
BUN SERPL-MCNC: 18 MG/DL (ref 7–18)
CALCIUM SERPL-MCNC: 8.2 MG/DL (ref 8.5–10.1)
CALCIUM SERPL-MCNC: 8.6 MG/DL (ref 8.5–10.1)
CHLORIDE SERPL-SCNC: 100 MMOL/L (ref 98–107)
CHLORIDE SERPL-SCNC: 99 MMOL/L (ref 98–107)
CO2 SERPL-SCNC: 24 MMOL/L (ref 21–32)
CO2 SERPL-SCNC: 28 MMOL/L (ref 21–32)
COLOR UR: YELLOW
CREAT SERPL-MCNC: 1.4 MG/DL (ref 0.6–1.3)
CREAT SERPL-MCNC: 1.5 MG/DL (ref 0.6–1.3)
EOSINOPHIL NFR BLD AUTO: 0.6 % (ref 0–6)
EOSINOPHIL NFR BLD AUTO: 0.9 % (ref 0–6)
GLUCOSE SERPL-MCNC: 100 MG/DL (ref 74–106)
GLUCOSE SERPL-MCNC: 93 MG/DL (ref 74–106)
GLUCOSE UR STRIP-MCNC: (no result) MG/DL
HCT VFR BLD AUTO: 32 % (ref 33–45)
HCT VFR BLD AUTO: 33 % (ref 33–45)
HGB BLD-MCNC: 10.1 G/DL (ref 11.5–14.8)
HGB BLD-MCNC: 9.4 G/DL (ref 11.5–14.8)
LYMPHOCYTES NFR BLD AUTO: 1.8 K/UL (ref 0.8–4.8)
LYMPHOCYTES NFR BLD AUTO: 12.6 % (ref 20–44)
LYMPHOCYTES NFR BLD AUTO: 15.7 % (ref 20–44)
LYMPHOCYTES NFR BLD AUTO: 2.2 K/UL (ref 0.8–4.8)
MAGNESIUM SERPL-MCNC: 2.1 MG/DL (ref 1.8–2.4)
MCHC RBC AUTO-ENTMCNC: 30 G/DL (ref 31–36)
MCHC RBC AUTO-ENTMCNC: 30 G/DL (ref 31–36)
MCV RBC AUTO: 81 FL (ref 82–100)
MCV RBC AUTO: 82 FL (ref 82–100)
MONOCYTES NFR BLD AUTO: 1 K/UL (ref 0.1–1.3)
MONOCYTES NFR BLD AUTO: 1 K/UL (ref 0.1–1.3)
MONOCYTES NFR BLD AUTO: 7.1 % (ref 2–12)
MONOCYTES NFR BLD AUTO: 7.2 % (ref 2–12)
NEUTROPHILS # BLD AUTO: 10.7 K/UL (ref 1.8–8.9)
NEUTROPHILS # BLD AUTO: 11.4 K/UL (ref 1.8–8.9)
NEUTROPHILS NFR BLD AUTO: 75.6 % (ref 43–81)
NEUTROPHILS NFR BLD AUTO: 78.3 % (ref 43–81)
PH UR STRIP: 6 [PH] (ref 5–8)
PHOSPHATE SERPL-MCNC: 3.6 MG/DL (ref 2.5–4.9)
PLATELET # BLD AUTO: 654 K/UL (ref 150–450)
PLATELET # BLD AUTO: 740 K/UL (ref 150–450)
POTASSIUM SERPL-SCNC: 4 MMOL/L (ref 3.5–5.1)
POTASSIUM SERPL-SCNC: 4.1 MMOL/L (ref 3.5–5.1)
PROT SERPL-MCNC: 7.4 G/DL (ref 6.4–8.2)
PROT UR QL STRIP: 30 MG/DL
RBC # BLD AUTO: 3.86 MIL/UL (ref 4–5.2)
RBC # BLD AUTO: 4.07 MIL/UL (ref 4–5.2)
RBC #/AREA URNS HPF: (no result) /HPF (ref 0–2)
SODIUM SERPL-SCNC: 135 MMOL/L (ref 136–145)
SODIUM SERPL-SCNC: 136 MMOL/L (ref 136–145)
UROBILINOGEN UR STRIP-MCNC: 0.2 EU/DL
WBC #/AREA URNS HPF: (no result) /HPF (ref 0–3)
WBC NRBC COR # BLD AUTO: 14.2 K/UL (ref 4.3–11)
WBC NRBC COR # BLD AUTO: 14.5 K/UL (ref 4.3–11)

## 2021-09-21 PROCEDURE — G0378 HOSPITAL OBSERVATION PER HR: HCPCS

## 2021-09-21 PROCEDURE — A6253 ABSORPT DRG > 48 SQ IN W/O B: HCPCS

## 2021-09-21 PROCEDURE — C9803 HOPD COVID-19 SPEC COLLECT: HCPCS

## 2021-09-21 RX ADMIN — ATORVASTATIN CALCIUM SCH MG: 10 TABLET, FILM COATED ORAL at 21:12

## 2021-09-21 RX ADMIN — TRAZODONE HYDROCHLORIDE SCH MG: 50 TABLET ORAL at 21:12

## 2021-09-21 RX ADMIN — METFORMIN HYDROCHLORIDE SCH MG: 500 TABLET, FILM COATED ORAL at 17:07

## 2021-09-21 RX ADMIN — DEXTROSE MONOHYDRATE SCH MLS/HR: 50 INJECTION, SOLUTION INTRAVENOUS at 17:17

## 2021-09-21 RX ADMIN — Medication SCH EACH: at 17:49

## 2021-09-21 RX ADMIN — Medication SCH EACH: at 22:18

## 2021-09-21 NOTE — NUR
TO ER BED 1, C/O LLE EDEMA FOR 3WKS, SKIN TEAR ON LFA WITH DRESSING HAPPENED 
2WKS AGO, AAOX4, BREATHING EVEN AND NON LABORED INTERVAL HPI/OVERNIGHT EVENTS:  No acute events overnight. Had HD yesterday. Patient denies fever, chills, dizziness, syncope, chest pain, palpitations, SOB, cough, abd pain, n/v/d/c.    MEDICATIONS  (STANDING):  aspirin enteric coated 81 milliGRAM(s) Oral daily  atorvastatin Oral Tab/Cap - Peds 40 milliGRAM(s) Oral daily  metoprolol tartrate 25 milliGRAM(s) Oral two times a day  midazolam Injectable 2 milliGRAM(s) IV Push once  midodrine. 10 milliGRAM(s) Oral <User Schedule>  pantoprazole    Tablet 40 milliGRAM(s) Oral before breakfast    MEDICATIONS  (PRN):  oxycodone    5 mG/acetaminophen 325 mG 1 Tablet(s) Oral every 4 hours PRN Moderate Pain (4 - 6)    Allergies  No Known Allergies    REVIEW OF SYSTEMS  A ten-point review of systems was otherwise negative except as noted.    Vital Signs Last 24 Hrs  T(C): 36.2 (07 Aug 2020 07:55), Max: 37.1 (06 Aug 2020 16:55)  T(F): 97.1 (07 Aug 2020 04:45), Max: 98.7 (06 Aug 2020 16:55)  HR: 65 (07 Aug 2020 07:55) (63 - 67)  BP: 148/69 (07 Aug 2020 07:55) (108/55 - 149/67)  BP(mean): --  RR: 18 (07 Aug 2020 08:59) (16 - 18)  SpO2: 96% (07 Aug 2020 08:59) (95% - 98%)    08-06-20 @ 07:01  -  08-07-20 @ 07:00  --------------------------------------------------------  IN: 150 mL / OUT: 1800 mL / NET: -1650 mL    Physical Exam  GENERAL: In no apparent distress, well nourished, and hydrated.  HEART: Regular rate and rhythm; No murmurs, rubs, or gallops.  PULMONARY: Clear to auscultation and perfusion.  No rales, wheezing, or rhonchi bilaterally.  ABDOMEN: Soft, Nontender, Nondistended; Bowel sounds present. Suprapubic catheter in place.   EXTREMITIES: RUE fistula. 2+ Peripheral Pulses, No clubbing, cyanosis, or edema  NEUROLOGICAL: AO x3, MCCULLOUGH. Speech clear    LABS:                        11.7   7.03  )-----------( 146      ( 07 Aug 2020 04:30 )             35.9     08-07    139  |  95<L>  |  32<H>  ----------------------------<  103<H>  3.8   |  25  |  4.7<HH>    Ca    8.7      07 Aug 2020 04:30    TPro  5.9<L>  /  Alb  4.0  /  TBili  0.4  /  DBili  x   /  AST  10  /  ALT  6   /  AlkPhos  159<H>  08-07 08-06-20 @ 07:01  -  08-07-20 @ 07:00  --------------------------------------------------------  IN: 150 mL / OUT: 1800 mL / NET: -1650 mL    08-06-20 @ 07:01  -  08-07-20 @ 07:00  --------------------------------------------------------  IN: 150 mL / OUT: 1800 mL / NET: -1650 mL    RADIOLOGY & ADDITIONAL TESTS:  < from: Xray Chest 1 View-PORTABLE IMMEDIATE (08.04.20 @ 16:50) >  IMPRESSION:    No new focal consolidation.    < end of copied text >

## 2021-09-21 NOTE — NUR
TELE RN OPENING NOTES



PATIENT WAS LAST SEEN AWAKE RESTING IN BED. PATIENT'S ON ROOM AIR WITH NO RESPIRATORY 
DISTRESS NOTED. PATIENT'S CONNECTED TO A HEART MONITOR WITH NO CARDIAC DISTRESS NOTED. 
PATIENT HAS AN IV ACCESS ON HER RAC GAUGE #18. PATIENT'S IN NO ACUTE DISTRESS AT THIS TIME. 
SAFETY PRECAUTIONS IN PLACE: BED LOCKED, BED ALARM ON, SIDE RAILS UP X3, AND CALL LIGHT 
WITHIN EASY REACH OF THE PATIENT. WILL CONTINUE TO MONITOR THE PATIENT.

## 2021-09-21 NOTE — NUR
TELE RN ADMITTING NOTES



RECEIVED PATIENT VIA BELKIS, PATIENT IS AWAKE, A&O X 4, ABLE TO ,MAKE NEEDS KNOWN. ON ROOM 
AIR TOLERATING WELL. NO S/SX OF ACUTE DISTRESS NOTED. TRANSFERRED TO BED SAFELY. IV ACCESS 
ON RIGHT AC INTACT AND PATENT. HOOKED TO TELE MONITOR SHOWING SR HR AT 80'S. SKIN ISSUES 
IDENTIFIED, SKIN TEAR ON LEFT FOREARM AND REDNESS ON LEFT LOWER EXTREMITY NOTED, PICTURE 
TAKEN AND PLACED TO PATIENT'S CHART. SAFETY PRECAUTIONS IN PLACE: BED ON LOWEST LOCKED 
POSITION, SIDE RAILS UP X 2, CALL LIGHT WITHIN EASY REACH. WILL CONTINUE TO MONITOR 
ACCORDINGLY. 

-------------------------------------------------------------------------------

Addendum: 09/21/21 at 1835 by TRACY WAGNER RN

-------------------------------------------------------------------------------

PATIENT WAS RECEIVED FROM ED AT 1645.

## 2021-09-21 NOTE — NUR
TELE RN CLOSING NOTES



PATIENT IN BED, AWAKE, A&O X 4, ABLE TO ,MAKE NEEDS KNOWN. ON ROOM AIR TOLERATING WELL. NO 
S/SX OF ACUTE DISTRESS NOTED. IV ACCESS ON RIGHT AC INTACT AND PATENT. ON TELE MONITOR 
SHOWING SR HR AT 80'S. SAFETY PRECAUTIONS IN PLACE: BED ON LOWEST LOCKED POSITION, SIDE 
RAILS UP X 2, CALL LIGHT WITHIN EASY REACH. ALL NEEDS ATTENDED AND MET. WILL ENDORSE TO 
ONCOMING SHIFT FOR PIPPA.

## 2021-09-22 VITALS — DIASTOLIC BLOOD PRESSURE: 60 MMHG | SYSTOLIC BLOOD PRESSURE: 117 MMHG

## 2021-09-22 VITALS — DIASTOLIC BLOOD PRESSURE: 62 MMHG | SYSTOLIC BLOOD PRESSURE: 121 MMHG

## 2021-09-22 VITALS — DIASTOLIC BLOOD PRESSURE: 55 MMHG | SYSTOLIC BLOOD PRESSURE: 102 MMHG

## 2021-09-22 VITALS — DIASTOLIC BLOOD PRESSURE: 59 MMHG | SYSTOLIC BLOOD PRESSURE: 118 MMHG

## 2021-09-22 VITALS — DIASTOLIC BLOOD PRESSURE: 48 MMHG | SYSTOLIC BLOOD PRESSURE: 97 MMHG

## 2021-09-22 LAB
BASOPHILS # BLD AUTO: 0 K/UL (ref 0–0.2)
BASOPHILS NFR BLD AUTO: 0.2 % (ref 0–2)
BUN SERPL-MCNC: 26 MG/DL (ref 7–18)
CALCIUM SERPL-MCNC: 8.4 MG/DL (ref 8.5–10.1)
CHLORIDE SERPL-SCNC: 107 MMOL/L (ref 98–107)
CO2 SERPL-SCNC: 33 MMOL/L (ref 21–32)
CREAT SERPL-MCNC: 0.9 MG/DL (ref 0.6–1.3)
EOSINOPHIL NFR BLD AUTO: 0.9 % (ref 0–6)
FERRITIN SERPL-MCNC: 198 NG/ML (ref 8–388)
GLUCOSE SERPL-MCNC: 228 MG/DL (ref 74–106)
HCT VFR BLD AUTO: 32 % (ref 33–45)
HGB BLD-MCNC: 9.6 G/DL (ref 11.5–14.8)
IRON SERPL-MCNC: 12 UG/DL (ref 50–175)
LYMPHOCYTES NFR BLD AUTO: 1.7 K/UL (ref 0.8–4.8)
LYMPHOCYTES NFR BLD AUTO: 11.3 % (ref 20–44)
MCHC RBC AUTO-ENTMCNC: 30 G/DL (ref 31–36)
MCV RBC AUTO: 81 FL (ref 82–100)
MONOCYTES NFR BLD AUTO: 0.9 K/UL (ref 0.1–1.3)
MONOCYTES NFR BLD AUTO: 5.7 % (ref 2–12)
NEUTROPHILS # BLD AUTO: 12.3 K/UL (ref 1.8–8.9)
NEUTROPHILS NFR BLD AUTO: 81.9 % (ref 43–81)
PLATELET # BLD AUTO: 707 K/UL (ref 150–450)
POTASSIUM SERPL-SCNC: 4.3 MMOL/L (ref 3.5–5.1)
RBC # BLD AUTO: 3.92 MIL/UL (ref 4–5.2)
SODIUM SERPL-SCNC: 146 MMOL/L (ref 136–145)
TIBC SERPL-MCNC: 189 UG/DL (ref 250–450)
WBC NRBC COR # BLD AUTO: 15 K/UL (ref 4.3–11)

## 2021-09-22 RX ADMIN — POTASSIUM CHLORIDE SCH MEQ: 750 TABLET, FILM COATED, EXTENDED RELEASE ORAL at 08:18

## 2021-09-22 RX ADMIN — INSULIN HUMAN PRN UNITS: 100 INJECTION, SOLUTION PARENTERAL at 17:25

## 2021-09-22 RX ADMIN — VENLAFAXINE HYDROCHLORIDE SCH MG: 150 CAPSULE, EXTENDED RELEASE ORAL at 08:18

## 2021-09-22 RX ADMIN — Medication SCH EACH: at 07:58

## 2021-09-22 RX ADMIN — TRAZODONE HYDROCHLORIDE SCH MG: 50 TABLET ORAL at 21:39

## 2021-09-22 RX ADMIN — DEXTROSE MONOHYDRATE SCH MLS/HR: 50 INJECTION, SOLUTION INTRAVENOUS at 16:21

## 2021-09-22 RX ADMIN — METFORMIN HYDROCHLORIDE SCH MG: 500 TABLET, FILM COATED ORAL at 16:21

## 2021-09-22 RX ADMIN — Medication SCH EACH: at 21:41

## 2021-09-22 RX ADMIN — ATORVASTATIN CALCIUM SCH MG: 10 TABLET, FILM COATED ORAL at 21:39

## 2021-09-22 RX ADMIN — FERROUS SULFATE TAB 325 MG (65 MG ELEMENTAL FE) SCH MG: 325 (65 FE) TAB at 16:21

## 2021-09-22 RX ADMIN — Medication SCH EACH: at 17:24

## 2021-09-22 RX ADMIN — METFORMIN HYDROCHLORIDE SCH MG: 500 TABLET, FILM COATED ORAL at 08:19

## 2021-09-22 RX ADMIN — ALLOPURINOL SCH MG: 100 TABLET ORAL at 08:18

## 2021-09-22 RX ADMIN — Medication SCH EACH: at 12:01

## 2021-09-22 RX ADMIN — INSULIN HUMAN PRN UNITS: 100 INJECTION, SOLUTION PARENTERAL at 12:01

## 2021-09-22 RX ADMIN — DILTIAZEM HYDROCHLORIDE SCH MG: 240 CAPSULE, EXTENDED RELEASE ORAL at 08:20

## 2021-09-22 RX ADMIN — LEVOTHYROXINE SODIUM SCH MCG: 25 TABLET ORAL at 08:18

## 2021-09-22 RX ADMIN — LIDOCAINE SCH EA: 50 PATCH CUTANEOUS at 08:18

## 2021-09-22 NOTE — NUR
RN NOTES



SPOKE W/ PATIENT REGARDING BONE [place] BIOPSY PROCEDURE TO OBTAIN CONSENT; PER PATIENT, SHE 
DOES NOT WANT ANYTHING TO DO WITH IT UNTIL HER DAUGHTER EXPLAINS THE PROCEDURE TO HER.
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
## 2021-09-22 NOTE — NUR
RN NOTES



SPOKE W/ PATIENT'S DAUGHTER, MICAH, AND DISCUSSED PATIENT'S MEDICATION LIST AND ASKS TO 
HAVE SUBOXONE MEDICATION, ALONG W/ OTHERS, BE INCLUDED. SPOKE W/ VIANEY, MED RECON NURSE, AND 
WAS INFORMED THAT MEDICATIONS THAT DAUGHTER WANTS TO BE INCLUDED WERE NOT PART OF THE LIST 
FROM THE FACILITY SHE WAS FROM. INFORMED DR. OH ABOUT DTR'S REQUEST FOR THE SUBOXONE 
MEDICATION AS DTR WOULD LIKE FOR PATIENT TO RECEIVE SUBOXONE PRIOR TO BONE M[place] BIOPSY. 
CONSENT FORM SIGNED BY PATIENT AFTER DTR SPOKE W/ HER AND EXPLAINED PROCEDURE.

## 2021-09-22 NOTE — NUR
RN NOTES



PATIENT SEEN IN BED RESTING, AWAKE AND VERBALLY RESPONSIVE, NAD. CURRENTLY MS. BREATHING 
EVEN AND UNLABORED, CONTINUES ON ROOM AIR. IV LINE INTACT AND PATENT. FOR BONE [place]ARR[place] BIOPSY 
AND ASPIRATION TOMORROW W/ DR. HOLLIS, CONSENT FORM IN THE CHART. UPDATED DTR OVER THE PHONE. 
SAFETY MEASURES MAINTAINED. WILL ENDORSE TO NIGHT SHIFT RN FOR PIPPA.

## 2021-09-22 NOTE — NUR
RN NOTES



PATIENT SEEN AWAKE RESTING IN BED, ABLE TO USE BEDSIDE COMMODE. ON ROOM AIR WITH NO 
RESPIRATORY DISTRESS NOTED. ON TELE MONITORING, READING OF CONTROLLED AFIB WITH NO CARDIAC 
DISTRESS NOTED. IV ACCESS ON RAC GAUGE #18, INTACT AND PATENT. SAFETY PRECAUTIONS IN PLACE: 
BED LOCKED, BED ALARM ON, SIDE RAILS UP X3, AND CALL LIGHT WITHIN EASY REACH OF THE PATIENT. 
WILL CONTINUE TO MONITOR.

## 2021-09-22 NOTE — NUR
MS RN NOTES

RECEIVED ON LAYING COMFORTABLY ON BED, A/O X3,BREATHING EASY,NO SOB,SALINE LOCK RIGHT AC 
INTACT AND PATENT.NOTED SWELLING ON LEFT LEG,ULTRASOUND REVEALS NEGATIVE FOR DVT.INSTRUCTED 
NPO POST MIDNIGHT FOR BONE MARROW BIOPSY TOMORROW MORNING.CALL LIGHT IN REACH,NEEDS 
ANTICIPATED.

## 2021-09-23 VITALS — SYSTOLIC BLOOD PRESSURE: 131 MMHG | DIASTOLIC BLOOD PRESSURE: 61 MMHG

## 2021-09-23 LAB
ALBUMIN SERPL BCP-MCNC: 1.8 G/DL (ref 3.4–5)
ALBUMIN/GLOB SERPL: 0.6 {RATIO} (ref 0.7–1.7)
ALP SERPL-CCNC: 110 U/L (ref 46–116)
ALPHA1 GLOB SERPL ELPH-MCNC: 0.5 G/DL (ref 0–0.4)
ALPHA2 GLOB SERPL ELPH-MCNC: 0.9 G/DL (ref 0.4–1)
ALT SERPL W P-5'-P-CCNC: 43 U/L (ref 12–78)
AST SERPL W P-5'-P-CCNC: 44 U/L (ref 15–37)
B-GLOBULIN SERPL ELPH-MCNC: 1.1 G/DL (ref 0.7–1.3)
BASOPHILS # BLD AUTO: 0 K/UL (ref 0–0.2)
BASOPHILS # BLD AUTO: 0.1 K/UL (ref 0–0.2)
BASOPHILS NFR BLD AUTO: 0.3 % (ref 0–2)
BASOPHILS NFR BLD AUTO: 1 % (ref 0–2)
BILIRUB SERPL-MCNC: 0.2 MG/DL (ref 0.2–1)
BILIRUB UR QL STRIP: NEGATIVE
BUN SERPL-MCNC: 12 MG/DL (ref 7–18)
CALCIUM SERPL-MCNC: 8.2 MG/DL (ref 8.5–10.1)
CHLORIDE SERPL-SCNC: 104 MMOL/L (ref 98–107)
CO2 SERPL-SCNC: 26 MMOL/L (ref 21–32)
COLOR UR: YELLOW
CREAT SERPL-MCNC: 1.4 MG/DL (ref 0.6–1.3)
CREAT UR-MCNC: 79 MG/DL (ref 30–125)
EOSINOPHIL NFR BLD AUTO: 0.7 % (ref 0–6)
EOSINOPHIL NFR BLD AUTO: 1 % (ref 0–6)
GLUCOSE SERPL-MCNC: 112 MG/DL (ref 74–106)
GLUCOSE UR STRIP-MCNC: (no result) MG/DL
HCT VFR BLD AUTO: 31 % (ref 33–45)
HCT VFR BLD AUTO: 32 % (ref 33–45)
HGB BLD-MCNC: 9.4 G/DL (ref 11.5–14.8)
HGB BLD-MCNC: 9.8 G/DL (ref 11.5–14.8)
IGA SERPL-MCNC: 363 MG/DL (ref 64–422)
IGM SERPL-MCNC: 105 MG/DL (ref 26–217)
LEUKOCYTE ESTERASE UR QL STRIP: NEGATIVE
LYMPHOCYTES NFR BLD AUTO: 1.3 K/UL (ref 0.8–4.8)
LYMPHOCYTES NFR BLD AUTO: 1.4 K/UL (ref 0.8–4.8)
LYMPHOCYTES NFR BLD AUTO: 11 % (ref 20–44)
LYMPHOCYTES NFR BLD AUTO: 8.9 % (ref 20–44)
MAGNESIUM SERPL-MCNC: 2 MG/DL (ref 1.8–2.4)
MCHC RBC AUTO-ENTMCNC: 30 G/DL (ref 31–36)
MCHC RBC AUTO-ENTMCNC: 31 G/DL (ref 31–36)
MCV RBC AUTO: 81 FL (ref 82–100)
MCV RBC AUTO: 81 FL (ref 82–100)
MONOCYTES NFR BLD AUTO: 0.8 K/UL (ref 0.1–1.3)
MONOCYTES NFR BLD AUTO: 0.9 K/UL (ref 0.1–1.3)
MONOCYTES NFR BLD AUTO: 6 % (ref 2–12)
MONOCYTES NFR BLD AUTO: 6.5 % (ref 2–12)
NEUTROPHILS # BLD AUTO: 10.3 K/UL (ref 1.8–8.9)
NEUTROPHILS # BLD AUTO: 11.9 K/UL (ref 1.8–8.9)
NEUTROPHILS NFR BLD AUTO: 81.2 % (ref 43–81)
NEUTROPHILS NFR BLD AUTO: 83.4 % (ref 43–81)
NITRITE UR QL STRIP: NEGATIVE
PH UR STRIP: 6.5 [PH] (ref 5–8)
PHOSPHATE SERPL-MCNC: 3.6 MG/DL (ref 2.5–4.9)
PLATELET # BLD AUTO: 600 K/UL (ref 150–450)
PLATELET # BLD AUTO: 644 K/UL (ref 150–450)
POTASSIUM SERPL-SCNC: 3.8 MMOL/L (ref 3.5–5.1)
PROT SERPL-MCNC: 6.4 G/DL (ref 6.4–8.2)
PROT UR QL STRIP: (no result) MG/DL
PROT UR-MCNC: 79.5 MG/DL (ref 0–11.9)
RBC # BLD AUTO: 3.84 MIL/UL (ref 4–5.2)
RBC # BLD AUTO: 3.93 MIL/UL (ref 4–5.2)
SODIUM SERPL-SCNC: 136 MMOL/L (ref 136–145)
SODIUM UR-SCNC: 10 MMOL/L (ref 40–220)
UROBILINOGEN UR STRIP-MCNC: 0.2 EU/DL
WBC #/AREA URNS HPF: (no result) /HPF (ref 0–3)
WBC NRBC COR # BLD AUTO: 12.7 K/UL (ref 4.3–11)
WBC NRBC COR # BLD AUTO: 14.2 K/UL (ref 4.3–11)

## 2021-09-23 PROCEDURE — 07DR3ZX EXTRACTION OF ILIAC BONE MARROW, PERCUTANEOUS APPROACH, DIAGNOSTIC: ICD-10-PCS | Performed by: INTERNAL MEDICINE

## 2021-09-23 RX ADMIN — BUMETANIDE SCH MG: 1 TABLET ORAL at 17:18

## 2021-09-23 RX ADMIN — METFORMIN HYDROCHLORIDE SCH MG: 500 TABLET, FILM COATED ORAL at 09:50

## 2021-09-23 RX ADMIN — VENLAFAXINE HYDROCHLORIDE SCH MG: 150 CAPSULE, EXTENDED RELEASE ORAL at 09:52

## 2021-09-23 RX ADMIN — FERROUS SULFATE TAB 325 MG (65 MG ELEMENTAL FE) SCH MG: 325 (65 FE) TAB at 17:18

## 2021-09-23 RX ADMIN — ALLOPURINOL SCH MG: 100 TABLET ORAL at 09:51

## 2021-09-23 RX ADMIN — LIDOCAINE SCH EA: 50 PATCH CUTANEOUS at 09:45

## 2021-09-23 RX ADMIN — BUMETANIDE SCH MG: 1 TABLET ORAL at 11:23

## 2021-09-23 RX ADMIN — FERROUS SULFATE TAB 325 MG (65 MG ELEMENTAL FE) SCH MG: 325 (65 FE) TAB at 09:51

## 2021-09-23 RX ADMIN — DILTIAZEM HYDROCHLORIDE SCH MG: 240 CAPSULE, EXTENDED RELEASE ORAL at 09:50

## 2021-09-23 RX ADMIN — LEVOTHYROXINE SODIUM SCH MCG: 25 TABLET ORAL at 09:51

## 2021-09-23 RX ADMIN — Medication SCH EACH: at 14:49

## 2021-09-23 RX ADMIN — Medication SCH EACH: at 05:48

## 2021-09-23 RX ADMIN — POTASSIUM CHLORIDE SCH MEQ: 750 TABLET, FILM COATED, EXTENDED RELEASE ORAL at 09:52

## 2021-09-23 RX ADMIN — METFORMIN HYDROCHLORIDE SCH MG: 500 TABLET, FILM COATED ORAL at 17:18

## 2021-09-23 NOTE — NUR
MS RN DISCHARGE NOTES

PATIENT IS FOR DISCHARGE TO ASSISTED LIVING FACILITY PER DR. HOLLIS'S ORDER. DISCHARGE 
INSTRUCTION AND EDUCATION PROVIDED TO PATIENT AND EXPLAINED MEDICATIONS AND PRESCRIPTIONS. 
PATIENT VERBALIZED UNDERSTANDING. DISCHARGE FORM AND BELONGINGS LIST FORM SIGNED BY PATIENT. 
NAME WRIST BAND AND IV LINE REMOVED. PATIENT WAS PICKED UP BY AMBULANCE PERSONNEL VIA 
GURNEY. CALLED THE FACILITY FOR ENDORSEMENT. CHARGE NURSE AND MD ARE AWARE OF THE DISCHARGE.

## 2021-09-23 NOTE — NUR
MS RN NOTES

LAYING COMFORTABLY ON BED,NPO FOR THE PROCEDURE,AND MRCP WITH OUT CONTRAST.IN NO ACUTE 
DISTRESS.

## 2021-09-23 NOTE — NUR
MS RN OPENING NOTES

RECEIVED PATIENT ON BED, AWAKE AND A/O X 3. ON ROOM AIR TOLERATING WELL. NOT IN DISTRESS. 
WITH NO COMPLAINTS OF PAIN AT THIS TIME. WITH IV ACCESS AT LEFT AC G18, HEPLOCK, PATENT AND 
INTACT. SAFETY MEASURES IN PLACED. CALL LIGHT WITHIN REACH. BED ON LOWEST AND LOCKED 
POSITION. SIDE RAILS UP X2. WILL CONTINUE TO MONITOR.

## 2022-04-30 ENCOUNTER — HOSPITAL ENCOUNTER (EMERGENCY)
Dept: HOSPITAL 54 - ER | Age: 77
Discharge: HOME | End: 2022-04-30
Payer: MEDICARE

## 2022-04-30 VITALS — HEIGHT: 64 IN | WEIGHT: 150 LBS | BODY MASS INDEX: 25.61 KG/M2

## 2022-04-30 VITALS — SYSTOLIC BLOOD PRESSURE: 137 MMHG | DIASTOLIC BLOOD PRESSURE: 82 MMHG

## 2022-04-30 DIAGNOSIS — E78.5: ICD-10-CM

## 2022-04-30 DIAGNOSIS — I12.9: ICD-10-CM

## 2022-04-30 DIAGNOSIS — M79.7: ICD-10-CM

## 2022-04-30 DIAGNOSIS — F41.9: ICD-10-CM

## 2022-04-30 DIAGNOSIS — Z86.69: ICD-10-CM

## 2022-04-30 DIAGNOSIS — F17.200: ICD-10-CM

## 2022-04-30 DIAGNOSIS — Z90.89: ICD-10-CM

## 2022-04-30 DIAGNOSIS — R60.0: ICD-10-CM

## 2022-04-30 DIAGNOSIS — L03.116: Primary | ICD-10-CM

## 2022-04-30 DIAGNOSIS — Z90.710: ICD-10-CM

## 2022-04-30 DIAGNOSIS — F32.A: ICD-10-CM

## 2022-04-30 DIAGNOSIS — I25.10: ICD-10-CM

## 2022-04-30 DIAGNOSIS — E11.22: ICD-10-CM

## 2022-04-30 DIAGNOSIS — I48.91: ICD-10-CM

## 2022-04-30 DIAGNOSIS — Z79.899: ICD-10-CM

## 2022-04-30 DIAGNOSIS — G89.29: ICD-10-CM

## 2022-04-30 DIAGNOSIS — N18.9: ICD-10-CM

## 2022-04-30 LAB
ALBUMIN SERPL BCP-MCNC: 3 G/DL (ref 3.4–5)
ALP SERPL-CCNC: 96 U/L (ref 46–116)
ALT SERPL W P-5'-P-CCNC: 13 U/L (ref 12–78)
AST SERPL W P-5'-P-CCNC: 12 U/L (ref 15–37)
BASOPHILS # BLD AUTO: 0 K/UL (ref 0–0.2)
BASOPHILS NFR BLD AUTO: 0.5 % (ref 0–2)
BILIRUB DIRECT SERPL-MCNC: 0.1 MG/DL (ref 0–0.2)
BILIRUB SERPL-MCNC: 0.3 MG/DL (ref 0.2–1)
BUN SERPL-MCNC: 29 MG/DL (ref 7–18)
CALCIUM SERPL-MCNC: 8.9 MG/DL (ref 8.5–10.1)
CHLORIDE SERPL-SCNC: 102 MMOL/L (ref 98–107)
CO2 SERPL-SCNC: 32 MMOL/L (ref 21–32)
CREAT SERPL-MCNC: 1.3 MG/DL (ref 0.6–1.3)
EOSINOPHIL NFR BLD AUTO: 1.3 % (ref 0–6)
GLUCOSE SERPL-MCNC: 112 MG/DL (ref 74–106)
HCT VFR BLD AUTO: 32 % (ref 33–45)
HGB BLD-MCNC: 10.2 G/DL (ref 11.5–14.8)
LYMPHOCYTES NFR BLD AUTO: 1.6 K/UL (ref 0.8–4.8)
LYMPHOCYTES NFR BLD AUTO: 21.3 % (ref 20–44)
MCHC RBC AUTO-ENTMCNC: 32 G/DL (ref 31–36)
MCV RBC AUTO: 89 FL (ref 82–100)
MONOCYTES NFR BLD AUTO: 0.7 K/UL (ref 0.1–1.3)
MONOCYTES NFR BLD AUTO: 9.6 % (ref 2–12)
NEUTROPHILS # BLD AUTO: 5.1 K/UL (ref 1.8–8.9)
NEUTROPHILS NFR BLD AUTO: 67.3 % (ref 43–81)
PLATELET # BLD AUTO: 360 K/UL (ref 150–450)
POTASSIUM SERPL-SCNC: 5.1 MMOL/L (ref 3.5–5.1)
PROT SERPL-MCNC: 7.2 G/DL (ref 6.4–8.2)
RBC # BLD AUTO: 3.64 MIL/UL (ref 4–5.2)
SODIUM SERPL-SCNC: 136 MMOL/L (ref 136–145)
WBC NRBC COR # BLD AUTO: 7.6 K/UL (ref 4.3–11)

## 2022-04-30 NOTE — NUR
BIB SPOUSE C/O L LEG SWEELING X5 WEEKS PT WENT TO A SPECIALIST 4 WEEKS AGO AND 
THEY GAVE HER A SHOT, PT CANNOT RECALL WHAT SHE WAS GIVEN, STATED IT HELPED FOR 
A COUPLE DAYS AND THEN STARTED TO SWELL AGAIN. PT IS A&OX4, NO RESPIRATORY 
DISTRESS NOTED. AWAITING MD CARSON.

## 2022-06-21 ENCOUNTER — HOSPITAL ENCOUNTER (EMERGENCY)
Dept: HOSPITAL 54 - ER | Age: 77
Discharge: HOME | End: 2022-06-21
Payer: MEDICARE

## 2022-06-21 VITALS — SYSTOLIC BLOOD PRESSURE: 135 MMHG | DIASTOLIC BLOOD PRESSURE: 69 MMHG

## 2022-06-21 VITALS — WEIGHT: 170 LBS | HEIGHT: 65 IN | BODY MASS INDEX: 28.32 KG/M2

## 2022-06-21 DIAGNOSIS — I10: ICD-10-CM

## 2022-06-21 DIAGNOSIS — R14.0: Primary | ICD-10-CM

## 2022-06-21 DIAGNOSIS — Z90.89: ICD-10-CM

## 2022-06-21 DIAGNOSIS — F17.200: ICD-10-CM

## 2022-06-21 DIAGNOSIS — F41.9: ICD-10-CM

## 2022-06-21 DIAGNOSIS — Z79.899: ICD-10-CM

## 2022-06-21 DIAGNOSIS — G89.29: ICD-10-CM

## 2022-06-21 DIAGNOSIS — E78.5: ICD-10-CM

## 2022-06-21 DIAGNOSIS — J44.9: ICD-10-CM

## 2022-06-21 DIAGNOSIS — F32.A: ICD-10-CM

## 2022-06-21 DIAGNOSIS — Z86.69: ICD-10-CM

## 2022-06-21 DIAGNOSIS — M79.7: ICD-10-CM

## 2022-06-21 DIAGNOSIS — I48.91: ICD-10-CM

## 2022-06-21 DIAGNOSIS — E11.9: ICD-10-CM

## 2022-06-21 DIAGNOSIS — Z90.710: ICD-10-CM

## 2022-06-21 LAB
ALBUMIN SERPL BCP-MCNC: 3.2 G/DL (ref 3.4–5)
ALP SERPL-CCNC: 104 U/L (ref 46–116)
ALT SERPL W P-5'-P-CCNC: 17 U/L (ref 12–78)
AST SERPL W P-5'-P-CCNC: 12 U/L (ref 15–37)
BASOPHILS # BLD AUTO: 0 K/UL (ref 0–0.2)
BASOPHILS NFR BLD AUTO: 0.3 % (ref 0–2)
BILIRUB DIRECT SERPL-MCNC: 0 MG/DL (ref 0–0.2)
BILIRUB SERPL-MCNC: 0.2 MG/DL (ref 0.2–1)
BUN SERPL-MCNC: 22 MG/DL (ref 7–18)
CALCIUM SERPL-MCNC: 9.2 MG/DL (ref 8.5–10.1)
CHLORIDE SERPL-SCNC: 102 MMOL/L (ref 98–107)
CO2 SERPL-SCNC: 31 MMOL/L (ref 21–32)
CREAT SERPL-MCNC: 1.2 MG/DL (ref 0.6–1.3)
EOSINOPHIL NFR BLD AUTO: 0.8 % (ref 0–6)
GLUCOSE SERPL-MCNC: 111 MG/DL (ref 74–106)
HCT VFR BLD AUTO: 36 % (ref 33–45)
HGB BLD-MCNC: 11.5 G/DL (ref 11.5–14.8)
LIPASE SERPL-CCNC: 145 U/L (ref 73–393)
LYMPHOCYTES NFR BLD AUTO: 2.3 K/UL (ref 0.8–4.8)
LYMPHOCYTES NFR BLD AUTO: 22.4 % (ref 20–44)
MCHC RBC AUTO-ENTMCNC: 32 G/DL (ref 31–36)
MCV RBC AUTO: 86 FL (ref 82–100)
MONOCYTES NFR BLD AUTO: 0.8 K/UL (ref 0.1–1.3)
MONOCYTES NFR BLD AUTO: 7.7 % (ref 2–12)
NEUTROPHILS # BLD AUTO: 7.2 K/UL (ref 1.8–8.9)
NEUTROPHILS NFR BLD AUTO: 68.8 % (ref 43–81)
PLATELET # BLD AUTO: 429 K/UL (ref 150–450)
POTASSIUM SERPL-SCNC: 4.2 MMOL/L (ref 3.5–5.1)
PROT SERPL-MCNC: 7.8 G/DL (ref 6.4–8.2)
RBC # BLD AUTO: 4.16 MIL/UL (ref 4–5.2)
SODIUM SERPL-SCNC: 140 MMOL/L (ref 136–145)
WBC NRBC COR # BLD AUTO: 10.5 K/UL (ref 4.3–11)

## 2022-06-21 PROCEDURE — 83690 ASSAY OF LIPASE: CPT

## 2022-06-21 PROCEDURE — 74177 CT ABD & PELVIS W/CONTRAST: CPT

## 2022-06-21 PROCEDURE — 96361 HYDRATE IV INFUSION ADD-ON: CPT

## 2022-06-21 PROCEDURE — 83605 ASSAY OF LACTIC ACID: CPT

## 2022-06-21 PROCEDURE — 36415 COLL VENOUS BLD VENIPUNCTURE: CPT

## 2022-06-21 PROCEDURE — 80076 HEPATIC FUNCTION PANEL: CPT

## 2022-06-21 PROCEDURE — 80048 BASIC METABOLIC PNL TOTAL CA: CPT

## 2022-06-21 PROCEDURE — 85025 COMPLETE CBC W/AUTO DIFF WBC: CPT

## 2022-06-21 PROCEDURE — 99285 EMERGENCY DEPT VISIT HI MDM: CPT

## 2022-06-21 PROCEDURE — 96374 THER/PROPH/DIAG INJ IV PUSH: CPT

## 2022-06-21 NOTE — NUR
INSTRUCTION GIVEN TO PATIENT NOT TO TAKE METFORMIN FOR 48HRS POST IV CONTRAST. 
IT WILL BE DUE THURSDAY 6/23/22 AT 2030.

## 2022-06-21 NOTE — NUR
76 yrs femal  came [facility] by [facility] by [facility]ra c/o abdominale pain 
started at 8 am pt able to amblate at home by [facility]

## 2022-07-05 ENCOUNTER — HOSPITAL ENCOUNTER (INPATIENT)
Dept: HOSPITAL 54 - ER | Age: 77
LOS: 4 days | Discharge: PSYCHIATRIC OR SUBSTANCE USE FACILITY | DRG: 557 | End: 2022-07-09
Attending: NURSE PRACTITIONER | Admitting: NURSE PRACTITIONER
Payer: MEDICARE

## 2022-07-05 VITALS — WEIGHT: 210 LBS | BODY MASS INDEX: 34.99 KG/M2 | HEIGHT: 65 IN

## 2022-07-05 VITALS — SYSTOLIC BLOOD PRESSURE: 108 MMHG | DIASTOLIC BLOOD PRESSURE: 48 MMHG

## 2022-07-05 VITALS — DIASTOLIC BLOOD PRESSURE: 56 MMHG | SYSTOLIC BLOOD PRESSURE: 97 MMHG

## 2022-07-05 DIAGNOSIS — E78.5: ICD-10-CM

## 2022-07-05 DIAGNOSIS — E66.2: ICD-10-CM

## 2022-07-05 DIAGNOSIS — M19.90: ICD-10-CM

## 2022-07-05 DIAGNOSIS — I13.0: ICD-10-CM

## 2022-07-05 DIAGNOSIS — R59.0: ICD-10-CM

## 2022-07-05 DIAGNOSIS — Z79.01: ICD-10-CM

## 2022-07-05 DIAGNOSIS — E86.0: ICD-10-CM
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
## 2022-07-05 DIAGNOSIS — Z20.822: ICD-10-CM

## 2022-07-05 DIAGNOSIS — G93.40: ICD-10-CM

## 2022-07-05 DIAGNOSIS — R41.9: ICD-10-CM

## 2022-07-05 DIAGNOSIS — I25.10: ICD-10-CM

## 2022-07-05 DIAGNOSIS — J96.01: ICD-10-CM

## 2022-07-05 DIAGNOSIS — N18.9: ICD-10-CM

## 2022-07-05 DIAGNOSIS — S22.089A: ICD-10-CM

## 2022-07-05 DIAGNOSIS — G89.29: ICD-10-CM

## 2022-07-05 DIAGNOSIS — Z87.891: ICD-10-CM

## 2022-07-05 DIAGNOSIS — J44.9: ICD-10-CM

## 2022-07-05 DIAGNOSIS — M79.7: ICD-10-CM

## 2022-07-05 DIAGNOSIS — E11.40: ICD-10-CM

## 2022-07-05 DIAGNOSIS — I48.20: ICD-10-CM

## 2022-07-05 DIAGNOSIS — N17.0: ICD-10-CM

## 2022-07-05 DIAGNOSIS — E11.22: ICD-10-CM

## 2022-07-05 DIAGNOSIS — L08.9: ICD-10-CM
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
## 2022-07-05 DIAGNOSIS — F32.9: ICD-10-CM

## 2022-07-05 DIAGNOSIS — Z90.710: ICD-10-CM

## 2022-07-05 DIAGNOSIS — R29.6: ICD-10-CM

## 2022-07-05 DIAGNOSIS — Z73.6: ICD-10-CM

## 2022-07-05 DIAGNOSIS — M62.82: Primary | ICD-10-CM

## 2022-07-05 DIAGNOSIS — F39: ICD-10-CM

## 2022-07-05 DIAGNOSIS — F41.9: ICD-10-CM

## 2022-07-05 DIAGNOSIS — E03.9: ICD-10-CM
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
## 2022-07-05 DIAGNOSIS — F03.90: ICD-10-CM

## 2022-07-05 DIAGNOSIS — D72.829: ICD-10-CM

## 2022-07-05 DIAGNOSIS — I50.9: ICD-10-CM

## 2022-07-05 LAB
ALBUMIN SERPL BCP-MCNC: 2.6 G/DL (ref 3.4–5)
ALP SERPL-CCNC: 100 U/L (ref 46–116)
ALT SERPL W P-5'-P-CCNC: 43 U/L (ref 12–78)
AMMONIA PLAS-SCNC: < 10 UMOL/L (ref 11–32)
APAP SERPL-MCNC: < 0 UG/ML (ref 10–30)
AST SERPL W P-5'-P-CCNC: 123 U/L (ref 15–37)
BASOPHILS # BLD AUTO: 0.1 K/UL (ref 0–0.2)
BASOPHILS NFR BLD AUTO: 0.4 % (ref 0–2)
BILIRUB DIRECT SERPL-MCNC: 0.1 MG/DL (ref 0–0.2)
BILIRUB SERPL-MCNC: 0.3 MG/DL (ref 0.2–1)
BILIRUB UR QL STRIP: NEGATIVE
BUN SERPL-MCNC: 44 MG/DL (ref 7–18)
CALCIUM SERPL-MCNC: 8.8 MG/DL (ref 8.5–10.1)
CHLORIDE SERPL-SCNC: 97 MMOL/L (ref 98–107)
CO2 SERPL-SCNC: 35 MMOL/L (ref 21–32)
COLOR UR: YELLOW
CREAT SERPL-MCNC: 2 MG/DL (ref 0.6–1.3)
EOSINOPHIL NFR BLD AUTO: 1.2 % (ref 0–6)
ETHANOL SERPL-MCNC: < 3 MG/DL (ref 0–0)
GLUCOSE SERPL-MCNC: 126 MG/DL (ref 74–106)
GLUCOSE UR STRIP-MCNC: NEGATIVE MG/DL
HCT VFR BLD AUTO: 33 % (ref 33–45)
HGB BLD-MCNC: 10.4 G/DL (ref 11.5–14.8)
LEUKOCYTE ESTERASE UR QL STRIP: NEGATIVE
LYMPHOCYTES NFR BLD AUTO: 1.7 K/UL (ref 0.8–4.8)
LYMPHOCYTES NFR BLD AUTO: 12.6 % (ref 20–44)
MCHC RBC AUTO-ENTMCNC: 32 G/DL (ref 31–36)
MCV RBC AUTO: 86 FL (ref 82–100)
MONOCYTES NFR BLD AUTO: 1.2 K/UL (ref 0.1–1.3)
MONOCYTES NFR BLD AUTO: 9 % (ref 2–12)
NEUTROPHILS # BLD AUTO: 10.3 K/UL (ref 1.8–8.9)
NEUTROPHILS NFR BLD AUTO: 76.8 % (ref 43–81)
NITRITE UR QL STRIP: NEGATIVE
PH UR STRIP: 6 [PH] (ref 5–8)
PLATELET # BLD AUTO: 368 K/UL (ref 150–450)
POTASSIUM SERPL-SCNC: 4.6 MMOL/L (ref 3.5–5.1)
PROT SERPL-MCNC: 8.2 G/DL (ref 6.4–8.2)
PROT UR QL STRIP: (no result) MG/DL
RBC # BLD AUTO: 3.84 MIL/UL (ref 4–5.2)
RBC #/AREA URNS HPF: (no result) /HPF (ref 0–2)
SODIUM SERPL-SCNC: 139 MMOL/L (ref 136–145)
TSH SERPL DL<=0.005 MIU/L-ACNC: 3.1 UIU/ML (ref 0.36–3.74)
UROBILINOGEN UR STRIP-MCNC: 0.2 EU/DL
WBC #/AREA URNS HPF: (no result) /HPF (ref 0–3)
WBC NRBC COR # BLD AUTO: 13.4 K/UL (ref 4.3–11)

## 2022-07-05 PROCEDURE — G0378 HOSPITAL OBSERVATION PER HR: HCPCS

## 2022-07-05 PROCEDURE — C9803 HOPD COVID-19 SPEC COLLECT: HCPCS

## 2022-07-05 PROCEDURE — G0480 DRUG TEST DEF 1-7 CLASSES: HCPCS

## 2022-07-05 RX ADMIN — Medication SCH EACH: at 21:17

## 2022-07-05 RX ADMIN — APIXABAN SCH MG: 5 TABLET, FILM COATED ORAL at 20:25

## 2022-07-05 NOTE — NUR
RECEIVED PATIENT VIA GURNEY FROM [facility] DEPT., NO SSx APPARENT DISTRESS NOTED AT THIS TIME. VS 
TAKEN. CALL LIGHT IN REACH. WILL ENDORSE TO THE NEXT SHIFT

## 2022-07-05 NOTE — NUR
NOTED PATIENT WITH RIGHT SHOULDER SWOLLEN AND C/O PAIN, INFORMED OLU DOUGLAS ON CALL AND HE 
REPLIED WITH ORDER FOR STAT RIGHT SHOULDER XR, ORDER NOTED AND CARRIED OUT.

## 2022-07-05 NOTE — NUR
ALSO INFORMED STELLA ABOUT THE RESULTS OF CT ABDOMEN AND THE FX SHOWING I THE RESULT AND PER 
STELLA, TO DO THE X-RAY FOR THE RIGHT SHOULDER AND DO NOT WORRY ABOUT THE REST, NOTED ORDER 
AND CARRIED OUT.

## 2022-07-06 VITALS — DIASTOLIC BLOOD PRESSURE: 72 MMHG | SYSTOLIC BLOOD PRESSURE: 112 MMHG

## 2022-07-06 VITALS — DIASTOLIC BLOOD PRESSURE: 68 MMHG | SYSTOLIC BLOOD PRESSURE: 101 MMHG

## 2022-07-06 VITALS — SYSTOLIC BLOOD PRESSURE: 127 MMHG | DIASTOLIC BLOOD PRESSURE: 72 MMHG

## 2022-07-06 VITALS — DIASTOLIC BLOOD PRESSURE: 61 MMHG | SYSTOLIC BLOOD PRESSURE: 109 MMHG

## 2022-07-06 VITALS — SYSTOLIC BLOOD PRESSURE: 114 MMHG | DIASTOLIC BLOOD PRESSURE: 61 MMHG

## 2022-07-06 VITALS — DIASTOLIC BLOOD PRESSURE: 54 MMHG | SYSTOLIC BLOOD PRESSURE: 100 MMHG

## 2022-07-06 LAB
BASE EXCESS BLDA CALC-SCNC: 4.4 MMOL/L
BASOPHILS # BLD AUTO: 0 K/UL (ref 0–0.2)
BASOPHILS NFR BLD AUTO: 0.4 % (ref 0–2)
BUN SERPL-MCNC: 40 MG/DL (ref 7–18)
CALCIUM SERPL-MCNC: 8.6 MG/DL (ref 8.5–10.1)
CHLORIDE SERPL-SCNC: 100 MMOL/L (ref 98–107)
CK SERPL-CCNC: 1972 U/L (ref 26–192)
CO2 SERPL-SCNC: 32 MMOL/L (ref 21–32)
CREAT SERPL-MCNC: 1.8 MG/DL (ref 0.6–1.3)
DO-HGB MFR BLDA: 57.4 MMHG
EOSINOPHIL NFR BLD AUTO: 1.9 % (ref 0–6)
GLUCOSE SERPL-MCNC: 133 MG/DL (ref 74–106)
HCT VFR BLD AUTO: 30 % (ref 33–45)
HGB BLD-MCNC: 9.5 G/DL (ref 11.5–14.8)
INHALED O2 CONCENTRATION: 21 %
LYMPHOCYTES NFR BLD AUTO: 1 K/UL (ref 0.8–4.8)
LYMPHOCYTES NFR BLD AUTO: 10.3 % (ref 20–44)
MAGNESIUM SERPL-MCNC: 2.6 MG/DL (ref 1.8–2.4)
MCHC RBC AUTO-ENTMCNC: 32 G/DL (ref 31–36)
MCV RBC AUTO: 86 FL (ref 82–100)
MONOCYTES NFR BLD AUTO: 0.8 K/UL (ref 0.1–1.3)
MONOCYTES NFR BLD AUTO: 8.5 % (ref 2–12)
NEUTROPHILS # BLD AUTO: 7.6 K/UL (ref 1.8–8.9)
NEUTROPHILS NFR BLD AUTO: 78.9 % (ref 43–81)
PCO2 TEMP ADJ BLDA: 48.4 MMHG (ref 35–45)
PH TEMP ADJ BLDA: 7.41 [PH] (ref 7.35–7.45)
PHOSPHATE SERPL-MCNC: 3.3 MG/DL (ref 2.5–4.9)
PLATELET # BLD AUTO: 325 K/UL (ref 150–450)
PO2 TEMP ADJ BLDA: 34.4 MMHG (ref 75–100)
POTASSIUM SERPL-SCNC: 4.5 MMOL/L (ref 3.5–5.1)
RBC # BLD AUTO: 3.46 MIL/UL (ref 4–5.2)
SAO2 % BLDA: 65.1 % (ref 92–98.5)
SODIUM SERPL-SCNC: 138 MMOL/L (ref 136–145)
VENTILATION MODE VENT: (no result)
WBC NRBC COR # BLD AUTO: 9.7 K/UL (ref 4.3–11)

## 2022-07-06 PROCEDURE — B546ZZA ULTRASONOGRAPHY OF RIGHT SUBCLAVIAN VEIN, GUIDANCE: ICD-10-PCS | Performed by: NURSE PRACTITIONER

## 2022-07-06 PROCEDURE — 05H533Z INSERTION OF INFUSION DEVICE INTO RIGHT SUBCLAVIAN VEIN, PERCUTANEOUS APPROACH: ICD-10-PCS | Performed by: NURSE PRACTITIONER

## 2022-07-06 RX ADMIN — APIXABAN SCH MG: 5 TABLET, FILM COATED ORAL at 09:03

## 2022-07-06 RX ADMIN — PANTOPRAZOLE SODIUM SCH MG: 40 TABLET, DELAYED RELEASE ORAL at 09:01

## 2022-07-06 RX ADMIN — INSULIN HUMAN PRN UNITS: 100 INJECTION, SOLUTION PARENTERAL at 09:29

## 2022-07-06 RX ADMIN — DIAZEPAM SCH MG: 5 TABLET ORAL at 13:49

## 2022-07-06 RX ADMIN — Medication SCH EACH: at 18:05

## 2022-07-06 RX ADMIN — VENLAFAXINE HYDROCHLORIDE SCH MG: 150 CAPSULE, EXTENDED RELEASE ORAL at 09:01

## 2022-07-06 RX ADMIN — Medication SCH EACH: at 22:10

## 2022-07-06 RX ADMIN — Medication SCH EACH: at 12:00

## 2022-07-06 RX ADMIN — LEVOTHYROXINE SODIUM SCH MCG: 25 TABLET ORAL at 09:02

## 2022-07-06 RX ADMIN — ALLOPURINOL SCH MG: 100 TABLET ORAL at 09:03

## 2022-07-06 RX ADMIN — ATORVASTATIN CALCIUM SCH MG: 10 TABLET, FILM COATED ORAL at 22:00

## 2022-07-06 RX ADMIN — Medication SCH EACH: at 07:30

## 2022-07-06 RX ADMIN — APIXABAN SCH MG: 5 TABLET, FILM COATED ORAL at 17:39

## 2022-07-06 RX ADMIN — QUETIAPINE SCH MG: 25 TABLET, FILM COATED ORAL at 17:31

## 2022-07-06 RX ADMIN — INSULIN HUMAN PRN UNITS: 100 INJECTION, SOLUTION PARENTERAL at 18:05

## 2022-07-06 RX ADMIN — DIAZEPAM SCH MG: 5 TABLET ORAL at 13:00

## 2022-07-06 RX ADMIN — DILTIAZEM HYDROCHLORIDE SCH MG: 240 CAPSULE, EXTENDED RELEASE ORAL at 09:01

## 2022-07-06 NOTE — NUR
RN NOTE 



ATTEMPTED TO CONTACT DAUGHTER REGARDING HOME MEDICATIONS, DAUGHTER IS TO BRING MEDICATIONS 
BACK TO BE VERIFIED BY PHARMACY

## 2022-07-06 NOTE — NUR
RN OPENING NOTE,



PATIENT SLEEPING IN BED BUT RESPONDS TO NAME AND OPENS EYES, ON 2L NC NO SOB/ACUTE DISTRESS, 
PATIENT IS A/0X3 IV ACCESS NOTED ON R AC 18G AND LA FA 20G RUNNING NS AT 50 MLS/HR.  NOTED 
SAFETY MEASURES IN PLACE, BED LOCKED AND IN LOWEST POSITION, S/R OF BED X2 UP, CALL LIGHT 
W/I REACH, WILL ENDORSE CONTINUITY OF CARE TO ONCOMING NURSE

## 2022-07-06 NOTE — NUR
RN NOTE 



ATTEMPTED TO CONTACT [facility] REGARDING PROVIDERS ORDER FOR TLSO BACK BRACE FOR 
PATIENT [phone] CENTER IS CLOSED, NOT ACCEPTING CALLS AT THIS MOMENT. WILL ENDORSE TO 
NEXT NURSE.

## 2022-07-06 NOTE — NUR
RN CLOSING NOTE 



PATIENT IN BED, ON NC 23L BILATERAL WRIST RESTRAINTS  DUE TO AGITATION RENEW 1812. IV ACCESS 
ON RIGHT UPPER ARM 18G MIDLINE SL. SAFETY MEASURES IN PLACE, CALL LIGHT WITHIN REACH BED 
ALARM ACTIVATED 2 SIDE RAILS UP. BED ALARM ACTIVATED WILL ENDORSE TO NIGHT NURSE FOR PIPPA.

## 2022-07-06 NOTE — NUR
RN CLOSING NOTE,



PATIENT IN BED, WITH ADEQUATE HOURS OF SLEEP, AT 3LPM NC , IV ACCES ON RIGHT UPPER ARM 
MIDLINE 18G SL.

## 2022-07-06 NOTE — NUR
RN NOTE



PT NOTED DESATING AT 75% NC, RT CALLED PLACE ON MASK AT 8L, PT TOLERATING WELL AT 91 %, WILL 
CONT TO MONITOR. CN MADE AWARE

## 2022-07-06 NOTE — NUR
[facility] RN OPENING NOTE



RECEIVED PATIENT IN BED AWAKE, YELLING AT CAREPROVIDERS, ON NC AT 3L TOLERATING WELL, 
PATIENT IS A/O X2-3, IV ACCESS NOTED ON SHREYA MIDLINE SL. NOTED WITH BILATERAL SOFT WRIST 
RESTRAINT, SAFETY MEASURES IN PLACE, BED LOCKED AND IN LOWEST POSITION, S/R OF BED X2 UP, 
CALL LIGHT WITHIN REACH, WILL CONTINUE TO MONITOR THROUGHOUT THE SHIFT.

## 2022-07-07 VITALS — DIASTOLIC BLOOD PRESSURE: 66 MMHG | SYSTOLIC BLOOD PRESSURE: 105 MMHG

## 2022-07-07 VITALS — SYSTOLIC BLOOD PRESSURE: 130 MMHG | DIASTOLIC BLOOD PRESSURE: 62 MMHG

## 2022-07-07 VITALS — DIASTOLIC BLOOD PRESSURE: 59 MMHG | SYSTOLIC BLOOD PRESSURE: 113 MMHG

## 2022-07-07 VITALS — DIASTOLIC BLOOD PRESSURE: 69 MMHG | SYSTOLIC BLOOD PRESSURE: 118 MMHG

## 2022-07-07 VITALS — DIASTOLIC BLOOD PRESSURE: 56 MMHG | SYSTOLIC BLOOD PRESSURE: 106 MMHG

## 2022-07-07 VITALS — SYSTOLIC BLOOD PRESSURE: 117 MMHG | DIASTOLIC BLOOD PRESSURE: 68 MMHG

## 2022-07-07 RX ADMIN — QUETIAPINE SCH MG: 25 TABLET, FILM COATED ORAL at 08:19

## 2022-07-07 RX ADMIN — Medication SCH EACH: at 12:11

## 2022-07-07 RX ADMIN — Medication SCH EA: at 08:49

## 2022-07-07 RX ADMIN — Medication SCH EA: at 14:48

## 2022-07-07 RX ADMIN — DILTIAZEM HYDROCHLORIDE SCH MG: 240 CAPSULE, EXTENDED RELEASE ORAL at 08:19

## 2022-07-07 RX ADMIN — APIXABAN SCH MG: 5 TABLET, FILM COATED ORAL at 17:03

## 2022-07-07 RX ADMIN — Medication SCH EACH: at 07:51

## 2022-07-07 RX ADMIN — PANTOPRAZOLE SODIUM SCH MG: 40 TABLET, DELAYED RELEASE ORAL at 07:51

## 2022-07-07 RX ADMIN — LORAZEPAM PRN MG: 2 INJECTION INTRAMUSCULAR; INTRAVENOUS at 09:19

## 2022-07-07 RX ADMIN — INSULIN HUMAN PRN UNITS: 100 INJECTION, SOLUTION PARENTERAL at 21:39

## 2022-07-07 RX ADMIN — Medication SCH EACH: at 21:40

## 2022-07-07 RX ADMIN — LEVOTHYROXINE SODIUM SCH MCG: 25 TABLET ORAL at 07:51

## 2022-07-07 RX ADMIN — APIXABAN SCH MG: 5 TABLET, FILM COATED ORAL at 08:22

## 2022-07-07 RX ADMIN — QUETIAPINE SCH MG: 25 TABLET, FILM COATED ORAL at 21:45

## 2022-07-07 RX ADMIN — Medication SCH EACH: at 17:04

## 2022-07-07 RX ADMIN — ATORVASTATIN CALCIUM SCH MG: 10 TABLET, FILM COATED ORAL at 21:43

## 2022-07-07 RX ADMIN — VENLAFAXINE HYDROCHLORIDE SCH MG: 150 CAPSULE, EXTENDED RELEASE ORAL at 08:19

## 2022-07-07 RX ADMIN — ALLOPURINOL SCH MG: 100 TABLET ORAL at 08:19

## 2022-07-07 NOTE — NUR
[facility] RN CLOSING NOTE



 PATIENT IN BED SLEEPING BUT EASILY AROUSABLE TO TOUCH AND VOICE, ON SIMPLE MASK AT 8L, 
TOLERATING WELL, PATIENT IS A/O X2-3, IV ACCESS NOTED ON SHREYA MIDLINE INTACT AND PATENT. WITH 
BILATERAL SOFT WRIST RESTRAINT, SAFETY MEASURES IN PLACE, BED LOCKED AND IN LOWEST POSITION, 
S/R OF BED X2 UP,ALL DUE MEDS GIVEN, KEPT DRY AND CLEAN, CALL LIGHT WITHIN REACH, WILL 
ENDORSE TO AM SHIFT NURSE.

## 2022-07-07 NOTE — NUR
ana rn notes

blood sugar at 10pm  is 126mg/dl  n0 isulin coverage given per sliding scale, pts on po diet

## 2022-07-07 NOTE — NUR
ELIO RN OPENING NOTE



RECEIVED PATIENT IN BED ASLEEP, COMFORTABLE IN BED , ON NC AT 6L TOLERATING WELL,SATING  93% 
PATIENT IS A/O X2-3, V/S STABLE  AFEBRILE  NO SOB NO DISTRESS  NOTED IV ACCESS NOTED ON SHREYA 
MIDLINE SL. NOTED WITH BILATERAL SOFT WRIST RESTRAINT, SAFETY MEASURES IN PLACE, BED LOCKED 
AND IN LOWEST POSITION, S/R OF BED X2 UP, CALL LIGHT WITHIN REACH, WILL CONTINUE TO MONITOR 
PTS.

## 2022-07-07 NOTE — NUR
RN NOTE



[facility] CALLED REGARDING TLSO BRACE ORDERED BY JOE SMITH. PER REQUEST, FACESHEET 
FAXED OVER. [facility] RE[facility] BEING UNABLE TO COME TO SEE PT TODAY AND WILL COME TOMORROW.

## 2022-07-08 VITALS — SYSTOLIC BLOOD PRESSURE: 120 MMHG | DIASTOLIC BLOOD PRESSURE: 61 MMHG

## 2022-07-08 VITALS — SYSTOLIC BLOOD PRESSURE: 116 MMHG | DIASTOLIC BLOOD PRESSURE: 63 MMHG

## 2022-07-08 VITALS — SYSTOLIC BLOOD PRESSURE: 121 MMHG | DIASTOLIC BLOOD PRESSURE: 70 MMHG

## 2022-07-08 VITALS — SYSTOLIC BLOOD PRESSURE: 100 MMHG | DIASTOLIC BLOOD PRESSURE: 49 MMHG

## 2022-07-08 VITALS — SYSTOLIC BLOOD PRESSURE: 121 MMHG | DIASTOLIC BLOOD PRESSURE: 63 MMHG

## 2022-07-08 VITALS — DIASTOLIC BLOOD PRESSURE: 59 MMHG | SYSTOLIC BLOOD PRESSURE: 118 MMHG

## 2022-07-08 LAB
BASOPHILS # BLD AUTO: 0 K/UL (ref 0–0.2)
BASOPHILS NFR BLD AUTO: 0.4 % (ref 0–2)
BUN SERPL-MCNC: 26 MG/DL (ref 7–18)
CALCIUM SERPL-MCNC: 8.7 MG/DL (ref 8.5–10.1)
CHLORIDE SERPL-SCNC: 104 MMOL/L (ref 98–107)
CO2 SERPL-SCNC: 33 MMOL/L (ref 21–32)
CREAT SERPL-MCNC: 1.5 MG/DL (ref 0.6–1.3)
EOSINOPHIL NFR BLD AUTO: 2.2 % (ref 0–6)
GLUCOSE SERPL-MCNC: 113 MG/DL (ref 74–106)
HCT VFR BLD AUTO: 31 % (ref 33–45)
HGB BLD-MCNC: 9.7 G/DL (ref 11.5–14.8)
LYMPHOCYTES NFR BLD AUTO: 1 K/UL (ref 0.8–4.8)
LYMPHOCYTES NFR BLD AUTO: 13.8 % (ref 20–44)
MCHC RBC AUTO-ENTMCNC: 32 G/DL (ref 31–36)
MCV RBC AUTO: 86 FL (ref 82–100)
MONOCYTES NFR BLD AUTO: 0.6 K/UL (ref 0.1–1.3)
MONOCYTES NFR BLD AUTO: 7.9 % (ref 2–12)
NEUTROPHILS # BLD AUTO: 5.7 K/UL (ref 1.8–8.9)
NEUTROPHILS NFR BLD AUTO: 75.7 % (ref 43–81)
PLATELET # BLD AUTO: 359 K/UL (ref 150–450)
POTASSIUM SERPL-SCNC: 4 MMOL/L (ref 3.5–5.1)
RBC # BLD AUTO: 3.58 MIL/UL (ref 4–5.2)
SODIUM SERPL-SCNC: 143 MMOL/L (ref 136–145)
WBC NRBC COR # BLD AUTO: 7.5 K/UL (ref 4.3–11)

## 2022-07-08 RX ADMIN — Medication SCH EACH: at 21:34

## 2022-07-08 RX ADMIN — Medication SCH EACH: at 09:00

## 2022-07-08 RX ADMIN — Medication SCH EACH: at 11:58

## 2022-07-08 RX ADMIN — INSULIN HUMAN PRN UNITS: 100 INJECTION, SOLUTION PARENTERAL at 12:44

## 2022-07-08 RX ADMIN — DILTIAZEM HYDROCHLORIDE SCH MG: 240 CAPSULE, EXTENDED RELEASE ORAL at 09:08

## 2022-07-08 RX ADMIN — ALLOPURINOL SCH MG: 100 TABLET ORAL at 09:04

## 2022-07-08 RX ADMIN — VENLAFAXINE HYDROCHLORIDE SCH MG: 150 CAPSULE, EXTENDED RELEASE ORAL at 09:04

## 2022-07-08 RX ADMIN — Medication SCH EACH: at 17:20

## 2022-07-08 RX ADMIN — LORAZEPAM PRN MG: 2 INJECTION INTRAMUSCULAR; INTRAVENOUS at 09:01

## 2022-07-08 RX ADMIN — Medication SCH EA: at 11:52

## 2022-07-08 RX ADMIN — PANTOPRAZOLE SODIUM SCH MG: 40 TABLET, DELAYED RELEASE ORAL at 09:01

## 2022-07-08 RX ADMIN — QUETIAPINE SCH MG: 25 TABLET, FILM COATED ORAL at 21:34

## 2022-07-08 RX ADMIN — ATORVASTATIN CALCIUM SCH MG: 10 TABLET, FILM COATED ORAL at 21:34

## 2022-07-08 RX ADMIN — LEVOTHYROXINE SODIUM SCH MCG: 25 TABLET ORAL at 09:09

## 2022-07-08 RX ADMIN — APIXABAN SCH MG: 5 TABLET, FILM COATED ORAL at 17:20

## 2022-07-08 RX ADMIN — APIXABAN SCH MG: 5 TABLET, FILM COATED ORAL at 09:03

## 2022-07-08 NOTE — NUR
OPENING NOTE



RECEIVED PATIENT IN BED ASLEEP, COMFORTABLE IN BED , ON NC AT 6L TOLERATING WELL,SATING  93% 
PATIENT IS A/O X2, V/S STABLE  AFEBRILE  NO SOB NO DISTRESS NOTED, IV ACCESS ON SHREYA MIDLINE 
SL PATENT. NOTED WITH BILATERAL SOFT WRIST RESTRAINT, SAFETY MEASURES IN PLACE, BED LOCKED 
AND IN LOWEST POSITION, S/R OF BED X2 UP, CALL LIGHT WITHIN REACH, WILL CONTINUE TO MONITOR 
AND ATTEND TO PT NEEDS.

## 2022-07-08 NOTE — NUR
ana rn notes

pts remains in bed  awake and responsive  no yusuf the whole  shift  ,pts on 6l via nc   
sating  93 % no sob no distress  with episode of agitation  on cristian   soft  wrist  restraint 
all  needs attended too call light within reach .will endorse to rn day shift  for  
continuity of care.

## 2022-07-08 NOTE — NUR
RN NOTE 



PT RECEIVED RESTING IN BED,  AWAKE ALERT AND RESPONSIVE. ON O2 VIA NC @6L. WITH BILATERAL 
SOFT HAND RESTRAINTS. NO SS OF CIRCULATORY IMPAIRMENT. SAFETY MEASURES FOLLOWED. WILL 
CONTINUE TO MONITOR.

## 2022-07-08 NOTE — NUR
RN NOTE



RECEIVED PT IN BED, AWAKE AOX2. ON O2 AT 6L. NOT IN ANY DISTRESS PT DENIES ANY SOB OR PAIN. 
PT WITH BILATERAL SOFT WRIST RESTRAINTS, WITH EPISODES OF PULLING OUT O2 CANNULA ANG GETTING 
UP. ALL SAFETY MEASURES IN PLACE, WILL CONTINUE TO MONITOR.

## 2022-07-08 NOTE — NUR
Reoriented pt. Restraint removed.

-------------------------------------------------------------------------------

Addendum: 07/08/22 at 1646 by JOSÉ MIGUEL BURGOS RN

-------------------------------------------------------------------------------

Amended: Links added.

## 2022-07-08 NOTE — NUR
CLOSING NOTE



PATIENT IN BED SLEEPING BUT EASILY AROUSABLE TO TOUCH AND VOICE, ON NC AT 6L, TOLERATING 
WELL, PATIENT IS A/O X2-3, IV ACCESS NOTED ON SHREYA MIDLINE INTACT AND PATENT. WITH BILATERAL 
SOFT WRIST RESTRAINT, SAFETY MEASURES IN PLACE, BED LOCKED AND IN LOWEST POSITION, S/R OF 
BED X2 UP,ALL DUE MEDS GIVEN, KEPT DRY AND CLEAN, CALL LIGHT WITHIN REACH, WILL ENDORSE TO 
PM SHIFT NURSE.

## 2022-07-09 ENCOUNTER — HOSPITAL ENCOUNTER (INPATIENT)
Dept: HOSPITAL 54 - GPS | Age: 77
LOS: 2 days | Discharge: HOME | DRG: 885 | End: 2022-07-11
Attending: INTERNAL MEDICINE | Admitting: PSYCHIATRY & NEUROLOGY
Payer: MEDICARE

## 2022-07-09 VITALS — DIASTOLIC BLOOD PRESSURE: 68 MMHG | SYSTOLIC BLOOD PRESSURE: 119 MMHG

## 2022-07-09 VITALS — SYSTOLIC BLOOD PRESSURE: 128 MMHG | DIASTOLIC BLOOD PRESSURE: 65 MMHG

## 2022-07-09 VITALS — SYSTOLIC BLOOD PRESSURE: 118 MMHG | DIASTOLIC BLOOD PRESSURE: 62 MMHG

## 2022-07-09 VITALS — DIASTOLIC BLOOD PRESSURE: 70 MMHG | SYSTOLIC BLOOD PRESSURE: 106 MMHG

## 2022-07-09 VITALS — DIASTOLIC BLOOD PRESSURE: 57 MMHG | SYSTOLIC BLOOD PRESSURE: 118 MMHG

## 2022-07-09 VITALS — DIASTOLIC BLOOD PRESSURE: 76 MMHG | SYSTOLIC BLOOD PRESSURE: 107 MMHG

## 2022-07-09 VITALS — BODY MASS INDEX: 28.44 KG/M2 | WEIGHT: 210 LBS | HEIGHT: 72 IN

## 2022-07-09 DIAGNOSIS — L08.9: ICD-10-CM

## 2022-07-09 DIAGNOSIS — Z90.710: ICD-10-CM

## 2022-07-09 DIAGNOSIS — Z87.39: ICD-10-CM

## 2022-07-09 DIAGNOSIS — N18.9: ICD-10-CM

## 2022-07-09 DIAGNOSIS — F32.A: ICD-10-CM
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
## 2022-07-09 DIAGNOSIS — R29.6: ICD-10-CM

## 2022-07-09 DIAGNOSIS — I25.10: ICD-10-CM

## 2022-07-09 DIAGNOSIS — G89.29: ICD-10-CM

## 2022-07-09 DIAGNOSIS — F29: ICD-10-CM

## 2022-07-09 DIAGNOSIS — E88.09: ICD-10-CM

## 2022-07-09 DIAGNOSIS — D68.59: ICD-10-CM

## 2022-07-09 DIAGNOSIS — I48.20: ICD-10-CM
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
## 2022-07-09 DIAGNOSIS — Z79.01: ICD-10-CM

## 2022-07-09 DIAGNOSIS — S91.109A: ICD-10-CM

## 2022-07-09 DIAGNOSIS — F39: Primary | ICD-10-CM

## 2022-07-09 DIAGNOSIS — M48.54XA: ICD-10-CM

## 2022-07-09 DIAGNOSIS — Z90.49: ICD-10-CM

## 2022-07-09 DIAGNOSIS — I12.9: ICD-10-CM

## 2022-07-09 DIAGNOSIS — M81.0: ICD-10-CM

## 2022-07-09 DIAGNOSIS — N17.0: ICD-10-CM

## 2022-07-09 DIAGNOSIS — M79.7: ICD-10-CM

## 2022-07-09 DIAGNOSIS — J44.9: ICD-10-CM

## 2022-07-09 DIAGNOSIS — Z79.899: ICD-10-CM

## 2022-07-09 DIAGNOSIS — F03.90: ICD-10-CM

## 2022-07-09 DIAGNOSIS — Z73.6: ICD-10-CM

## 2022-07-09 DIAGNOSIS — E11.22: ICD-10-CM

## 2022-07-09 DIAGNOSIS — F41.9: ICD-10-CM

## 2022-07-09 DIAGNOSIS — G93.41: ICD-10-CM

## 2022-07-09 DIAGNOSIS — E78.5: ICD-10-CM

## 2022-07-09 DIAGNOSIS — Z87.891: ICD-10-CM

## 2022-07-09 DIAGNOSIS — E03.9: ICD-10-CM

## 2022-07-09 LAB
BASE EXCESS BLDA CALC-SCNC: 4.7 MMOL/L
BUN SERPL-MCNC: 20 MG/DL (ref 7–18)
CALCIUM SERPL-MCNC: 8.7 MG/DL (ref 8.5–10.1)
CHLORIDE SERPL-SCNC: 105 MMOL/L (ref 98–107)
CO2 SERPL-SCNC: 32 MMOL/L (ref 21–32)
CREAT SERPL-MCNC: 1.3 MG/DL (ref 0.6–1.3)
DO-HGB MFR BLDA: 126.9 MMHG
GLUCOSE SERPL-MCNC: 126 MG/DL (ref 74–106)
INHALED O2 CONCENTRATION: 32 %
INHALED O2 FLOW RATE: 3 L/MIN (ref 0–30)
PCO2 TEMP ADJ BLDA: 40.1 MMHG (ref 35–45)
PH TEMP ADJ BLDA: 7.47 [PH] (ref 7.35–7.45)
PO2 TEMP ADJ BLDA: 54.3 MMHG (ref 75–100)
POTASSIUM SERPL-SCNC: 4 MMOL/L (ref 3.5–5.1)
SAO2 % BLDA: 88.3 % (ref 92–98.5)
SODIUM SERPL-SCNC: 140 MMOL/L (ref 136–145)
VENTILATION MODE VENT: (no result)

## 2022-07-09 RX ADMIN — Medication SCH EACH: at 10:01

## 2022-07-09 RX ADMIN — PANTOPRAZOLE SODIUM SCH MG: 40 TABLET, DELAYED RELEASE ORAL at 10:05

## 2022-07-09 RX ADMIN — CEPHALEXIN SCH MG: 500 CAPSULE ORAL at 12:43

## 2022-07-09 RX ADMIN — Medication SCH EA: at 10:27

## 2022-07-09 RX ADMIN — Medication SCH EACH: at 17:42

## 2022-07-09 RX ADMIN — LEVOTHYROXINE SODIUM SCH MCG: 25 TABLET ORAL at 10:05

## 2022-07-09 RX ADMIN — APIXABAN SCH MG: 5 TABLET, FILM COATED ORAL at 10:09

## 2022-07-09 RX ADMIN — INSULIN HUMAN PRN UNITS: 100 INJECTION, SOLUTION PARENTERAL at 12:33

## 2022-07-09 RX ADMIN — VENLAFAXINE HYDROCHLORIDE SCH MG: 150 CAPSULE, EXTENDED RELEASE ORAL at 10:04

## 2022-07-09 RX ADMIN — Medication SCH EACH: at 21:01

## 2022-07-09 RX ADMIN — Medication SCH EACH: at 12:33

## 2022-07-09 RX ADMIN — CEPHALEXIN SCH MG: 500 CAPSULE ORAL at 18:20

## 2022-07-09 RX ADMIN — GABAPENTIN SCH MG: 300 CAPSULE ORAL at 18:20

## 2022-07-09 RX ADMIN — GABAPENTIN SCH MG: 300 CAPSULE ORAL at 12:43

## 2022-07-09 RX ADMIN — DILTIAZEM HYDROCHLORIDE SCH MG: 240 CAPSULE, EXTENDED RELEASE ORAL at 10:04

## 2022-07-09 RX ADMIN — ALLOPURINOL SCH MG: 100 TABLET ORAL at 10:04

## 2022-07-09 RX ADMIN — APIXABAN SCH MG: 5 TABLET, FILM COATED ORAL at 18:21

## 2022-07-09 NOTE — NUR
RN NOTE



PT SLEEPING, AROUSES EASILY. NOT IN ANY DISTRESS, TOLERATES O2 AT 6L. NOTED WITH URINE 
RETENTION, >895ML FROM BLADDER SCANNER. NOTIFIED ON CALL NP STELLA, ORDERED TO INSERT IN AND 
OUT CATHETER. PT TOLERATED, DRAINED 900ML YELLOW URINE OUTPUT. MIDLINE REMAIN PATENT AND 
INTACT. DENIES PAIN. WILL ENDORSE TO NEXT SHIFT NURSE FOR PIPPA.

## 2022-07-09 NOTE — NUR
TRANSFERRED PT. TO GPS UNIT IN STABLE CONDITION, NO ACUTE DISTRESS NOTED AT THIS TIME. 
TRANSFERRED PATIENT PER PROTOCOL.

## 2022-07-09 NOTE — NUR
RN NOTES : ADMISSION  NOTES:

ADMITTED THIS 75Y/O FEMALE PATIENT  ADMIT FROM [facility] , INITIALLY FROM HOME. ADMITTED TO 
5150 HOLD, GRAVELY DISABLE, PER HOLD PT.  INCREASED CONFUSION,AGITATION 
DISORGNIZED,UNCOOPERATIVE ,DEPRESSED. UPON FACE TO FACE ASSESSMENT PATIENT IS A&O X1,  
ANXIOUS ,EASILY AGITATED, RESTLESS ,PARANOID ,DISHELVED ,HYPERVERBAL ,NEEDS FREQUENTLY 
REDIRECTIONS , DENIES SI /HI AT THIS TIME, PT. IS POOR HISTORIAN, POOR INSIGHT ,POOR 
JUDGEMENT AND UN ABLE TO CARE FOR HERSELF , BOTH MD AWARE AND NOTIFIED OF THE ADMISSION,  
BELONGINGS CONTRABAND WERE DONE  , PT. REFUSED SIGNS ADMISSION CONSENT PAPER DUE TO 
CONFUSED,ENCOURAGED PT. TO TAKE SHOWER, PT. RIGHTS DISCUSS BY WRITER , PROVIDE THE PT. WITH 
HANDBOOK, AND MEDICATIONS GUIDE, ENVIRONMENTAL SAFETY CHECK DONE,  ENCOURAGED PT. VERBALIZED 
ANY FEELING CONCERN TO STAFF, ORIENT TO UNIT POLICY, NO ACUTE DISTRESS NOTED,VITAL SIGNS  
WNL ,DENIES ANY PAIN  AT THIS TIME,WILL CONTINUE TO MONITOR FOR Q15 SAFETY AND BEHAVIOR.

## 2022-07-10 VITALS — DIASTOLIC BLOOD PRESSURE: 66 MMHG | SYSTOLIC BLOOD PRESSURE: 98 MMHG

## 2022-07-10 VITALS — DIASTOLIC BLOOD PRESSURE: 69 MMHG | SYSTOLIC BLOOD PRESSURE: 118 MMHG

## 2022-07-10 VITALS — SYSTOLIC BLOOD PRESSURE: 102 MMHG | DIASTOLIC BLOOD PRESSURE: 60 MMHG

## 2022-07-10 LAB
ALBUMIN SERPL BCP-MCNC: 2.4 G/DL (ref 3.4–5)
ALP SERPL-CCNC: 88 U/L (ref 46–116)
ALT SERPL W P-5'-P-CCNC: 23 U/L (ref 12–78)
AST SERPL W P-5'-P-CCNC: 24 U/L (ref 15–37)
BASOPHILS # BLD AUTO: 0 K/UL (ref 0–0.2)
BASOPHILS NFR BLD AUTO: 0.4 % (ref 0–2)
BILIRUB SERPL-MCNC: 0.3 MG/DL (ref 0.2–1)
BUN SERPL-MCNC: 18 MG/DL (ref 7–18)
CALCIUM SERPL-MCNC: 8.7 MG/DL (ref 8.5–10.1)
CHLORIDE SERPL-SCNC: 103 MMOL/L (ref 98–107)
CHOLEST SERPL-MCNC: 153 MG/DL (ref ?–200)
CO2 SERPL-SCNC: 31 MMOL/L (ref 21–32)
CREAT SERPL-MCNC: 1.4 MG/DL (ref 0.6–1.3)
EOSINOPHIL NFR BLD AUTO: 2.8 % (ref 0–6)
GLUCOSE SERPL-MCNC: 102 MG/DL (ref 74–106)
HCT VFR BLD AUTO: 33 % (ref 33–45)
HDLC SERPL-MCNC: 49 MG/DL (ref 40–60)
HGB BLD-MCNC: 10.5 G/DL (ref 11.5–14.8)
LDLC SERPL DIRECT ASSAY-MCNC: 85 MG/DL (ref 0–99)
LYMPHOCYTES NFR BLD AUTO: 1.9 K/UL (ref 0.8–4.8)
LYMPHOCYTES NFR BLD AUTO: 22.5 % (ref 20–44)
MCHC RBC AUTO-ENTMCNC: 32 G/DL (ref 31–36)
MCV RBC AUTO: 85 FL (ref 82–100)
MONOCYTES NFR BLD AUTO: 0.7 K/UL (ref 0.1–1.3)
MONOCYTES NFR BLD AUTO: 8.3 % (ref 2–12)
NEUTROPHILS # BLD AUTO: 5.7 K/UL (ref 1.8–8.9)
NEUTROPHILS NFR BLD AUTO: 66 % (ref 43–81)
PLATELET # BLD AUTO: 481 K/UL (ref 150–450)
POTASSIUM SERPL-SCNC: 4.2 MMOL/L (ref 3.5–5.1)
PROT SERPL-MCNC: 7.6 G/DL (ref 6.4–8.2)
RBC # BLD AUTO: 3.87 MIL/UL (ref 4–5.2)
SODIUM SERPL-SCNC: 141 MMOL/L (ref 136–145)
TRIGL SERPL-MCNC: 106 MG/DL (ref 30–150)
WBC NRBC COR # BLD AUTO: 8.6 K/UL (ref 4.3–11)

## 2022-07-10 RX ADMIN — ALLOPURINOL SCH MG: 100 TABLET ORAL at 09:06

## 2022-07-10 RX ADMIN — Medication SCH EACH: at 17:08

## 2022-07-10 RX ADMIN — Medication SCH EACH: at 07:25

## 2022-07-10 RX ADMIN — GABAPENTIN SCH MG: 300 CAPSULE ORAL at 10:10

## 2022-07-10 RX ADMIN — CETIRIZINE HYDROCHLORIDE SCH MG: 10 TABLET, FILM COATED ORAL at 09:12

## 2022-07-10 RX ADMIN — GABAPENTIN SCH MG: 300 CAPSULE ORAL at 09:06

## 2022-07-10 RX ADMIN — GABAPENTIN SCH MG: 300 CAPSULE ORAL at 17:00

## 2022-07-10 RX ADMIN — Medication SCH EA: at 10:10

## 2022-07-10 RX ADMIN — GABAPENTIN SCH MG: 300 CAPSULE ORAL at 12:08

## 2022-07-10 RX ADMIN — Medication SCH EACH: at 21:38

## 2022-07-10 RX ADMIN — AMOXICILLIN SCH MG: 250 CAPSULE ORAL at 17:01

## 2022-07-10 RX ADMIN — BUMETANIDE SCH MG: 1 TABLET ORAL at 12:08

## 2022-07-10 RX ADMIN — Medication SCH EACH: at 12:08

## 2022-07-10 RX ADMIN — PANTOPRAZOLE SODIUM SCH MG: 40 TABLET, DELAYED RELEASE ORAL at 07:33

## 2022-07-10 RX ADMIN — MAGNESIUM OXIDE TAB 400 MG (241.3 MG ELEMENTAL MG) SCH MG: 400 (241.3 MG) TAB at 09:13

## 2022-07-10 RX ADMIN — APIXABAN SCH MG: 5 TABLET, FILM COATED ORAL at 21:32

## 2022-07-10 RX ADMIN — BUMETANIDE SCH MG: 1 TABLET ORAL at 17:00

## 2022-07-10 RX ADMIN — VENLAFAXINE HYDROCHLORIDE SCH MG: 75 CAPSULE, EXTENDED RELEASE ORAL at 11:01

## 2022-07-10 RX ADMIN — APIXABAN SCH MG: 5 TABLET, FILM COATED ORAL at 09:09

## 2022-07-10 RX ADMIN — DILTIAZEM HYDROCHLORIDE SCH MG: 240 CAPSULE, EXTENDED RELEASE ORAL at 09:05

## 2022-07-10 RX ADMIN — POTASSIUM CHLORIDE SCH MEQ: 750 TABLET, FILM COATED, EXTENDED RELEASE ORAL at 09:03

## 2022-07-10 RX ADMIN — LEVOTHYROXINE SODIUM SCH MCG: 25 TABLET ORAL at 07:33

## 2022-07-10 NOTE — NUR
RN CLOSING NOTE



PATIENT AWAKE IN BED RESTING, A/O X 2. NO S/S OF PAIN NOTED AT THIS TIME. ON ROOM AIR, NO 
DISTRESS OR SHORTNESS OF BREATH NOTED. PATIENT IS COOPERATIVE AND COMPLIANT WITH MEDICATION. 
ALL SCHEDULE MEDICATIONS ADMINISTERED. PATIENT HAVE WOUNDS ON HER FEET, WAITING FOR WOUND 
CONSULT, WOUNDS WERE CLEAN AND COVER WITH DRY DRESSING. PATIENT DENIES SUICIDE IDEATION AND 
HOMICIDAL IDEATION AT THIS TIME. PATIENT HAS NO NEEDS AT THIS TIME. PATIENT EDUCATED ON THE 
USE OF THE CALL BELL. FALL AND SAFETY MEASURES IN PLACE, BED ALARM ON, BED IN LOW AND LOCK 
POSITION, CALL LIGHT AND TABLE WITHIN EASY REACH, SIDE RAILS UP X2. WILL ENDORSE TO NIGHT 
SHIFT.

## 2022-07-10 NOTE — NUR
RN NOTES:  PATIENT IN BED RESTING. NO S/SX OF ACUTE DISTRESS NOTED.  PT. 6 HOURS OF SLEEP

EASILY AGITED,PT.IS ON O2 3 LITTER VIA NASAL CANULA VITAL SIGNS WNL, AT BED SIDE SITTER  DUE 
TO  PT. IS ON OXYGEN AND SAFETY , NO VERBALIZATION OF THOUGHTS AND FEELINGS. SAFETY 
PRECAUTIONS IN PLACE. WILL CONTINUE TO MONITOR Q15MIN ROUNDS FOR SAFETY AND BEHAVIOR.

## 2022-07-10 NOTE — NUR
RN OPENING NOTE



PATIENT AWAKE IN BED RESTING, A/O X 1-2. NO S/S OF PAIN NOTED AT THIS TIME. ON ROOM AIR, NO 
DISTRESS OR SHORTNESS OF BREATH NOTED. PATIENT IS COOPERATIVE AND COMPLIANT WITH MEDICATION. 
PATIENT DENIES SUICIDE IDEATION AND HOMICIDAL IDEATION AT THIS TIME. PATIENT HAS NO NEEDS AT 
THIS TIME. PATIENT EDUCATED ON THE USE OF THE CALL BELL. FALL AND SAFETY MEASURES IN PLACE, 
BED ALARM ON, BED IN LOW AND LOCK POSITION, CALL LIGHT AND TABLE WITHIN EASY REACH, SIDE 
RAILS UP X2. WILL CONTINUE TO MONITOR Q15 MIN. WITH THE HELP OF STAFF TO MAINTAIN SAFETY.

## 2022-07-10 NOTE — NUR
RN NOTES:NOTIFIED PT.  BRANDON WILSON [phone] , REGARDING ABOUT [facility] 
ADMISSSION.   AND  LEFT MESSAGE TO  PT. DAUGHTER , GREG [phone] , REGARDING ABOUT 
GPS ADMISSION.

## 2022-07-11 VITALS — SYSTOLIC BLOOD PRESSURE: 127 MMHG | DIASTOLIC BLOOD PRESSURE: 74 MMHG

## 2022-07-11 VITALS — DIASTOLIC BLOOD PRESSURE: 74 MMHG | SYSTOLIC BLOOD PRESSURE: 127 MMHG

## 2022-07-11 LAB
BILIRUB UR QL STRIP: NEGATIVE
COLOR UR: YELLOW
CREAT UR-MCNC: 45.5 MG/DL (ref 30–125)
GLUCOSE UR STRIP-MCNC: NEGATIVE MG/DL
LEUKOCYTE ESTERASE UR QL STRIP: NEGATIVE
NITRITE UR QL STRIP: NEGATIVE
PH UR STRIP: 7 [PH] (ref 5–8)
PROT UR QL STRIP: NEGATIVE MG/DL
SODIUM UR-SCNC: 73 MMOL/L (ref 40–220)
UROBILINOGEN UR STRIP-MCNC: 0.2 EU/DL

## 2022-07-11 RX ADMIN — Medication SCH EA: at 10:00

## 2022-07-11 RX ADMIN — ALLOPURINOL SCH MG: 100 TABLET ORAL at 08:23

## 2022-07-11 RX ADMIN — MAGNESIUM OXIDE TAB 400 MG (241.3 MG ELEMENTAL MG) SCH MG: 400 (241.3 MG) TAB at 08:30

## 2022-07-11 RX ADMIN — APIXABAN SCH MG: 5 TABLET, FILM COATED ORAL at 08:31

## 2022-07-11 RX ADMIN — LEVOTHYROXINE SODIUM SCH MCG: 25 TABLET ORAL at 08:23

## 2022-07-11 RX ADMIN — BUMETANIDE SCH MG: 1 TABLET ORAL at 08:22

## 2022-07-11 RX ADMIN — VENLAFAXINE HYDROCHLORIDE SCH MG: 75 CAPSULE, EXTENDED RELEASE ORAL at 08:23

## 2022-07-11 RX ADMIN — Medication SCH EA: at 12:14

## 2022-07-11 RX ADMIN — POTASSIUM CHLORIDE SCH MEQ: 750 TABLET, FILM COATED, EXTENDED RELEASE ORAL at 08:23

## 2022-07-11 RX ADMIN — Medication SCH EACH: at 07:30

## 2022-07-11 RX ADMIN — GABAPENTIN SCH MG: 300 CAPSULE ORAL at 08:23

## 2022-07-11 RX ADMIN — Medication SCH EACH: at 12:14

## 2022-07-11 RX ADMIN — DILTIAZEM HYDROCHLORIDE SCH MG: 240 CAPSULE, EXTENDED RELEASE ORAL at 08:24

## 2022-07-11 RX ADMIN — PANTOPRAZOLE SODIUM SCH MG: 40 TABLET, DELAYED RELEASE ORAL at 08:22

## 2022-07-11 RX ADMIN — AMOXICILLIN SCH MG: 250 CAPSULE ORAL at 08:22

## 2022-07-11 RX ADMIN — GABAPENTIN SCH MG: 300 CAPSULE ORAL at 12:13

## 2022-07-11 RX ADMIN — CETIRIZINE HYDROCHLORIDE SCH MG: 10 TABLET, FILM COATED ORAL at 08:23

## 2022-07-11 NOTE — NUR
SW Discharge Note:

Patient will be discharged back home located at [address] [place], [place] 
[address]; ([phone]). Patients daughter Rain ([phone]) will  pt at 1PM. 
Patient is alert and oriented x2. Patient happy to be going back home. Patient denies 
suicidal or homicidal ideation. Patient denies visual/auditory hallucinations. Patient will 
follow up with primary doctor, Dr. Matthews located at [address] [place] [address] 
[address]; ([phone]) on July 21st at 9:15AM who will monitor and provide psychotropic 
medications. Patient presents with euthymic mood and congruent affect.

## 2022-07-11 NOTE — NUR
EWELINA Family Contact:

EWELINA spoke with pt's daughter Rain ([phone]) and discussed treatment and discharge 
plan. SW notified that pt will be discharged today back home and she was aware. Rain will 
 pt today at 1PM.

## 2022-07-11 NOTE — NUR
RN NOTE



HEALTH TEACHING DONE REGARDING DISCHARGE. PATIENT VERBALIZED UNDERSTANDING AND APPRECIATION. 
AWAITING DAUGHTER/ FAMILY TO  PATIENT. IN STABLE CONDITION. PATIENT REFUSED BODY 
CHECK BUT AGREED ON HAVING DRESSING CHANGED ON THE RIGHT GREAT TOE. DRESSING CHANGED AND 
DRESSING DRY AND INTACT. COMFORT MEASURES PROVIDED.

## 2022-07-11 NOTE — NUR
RN NOTE



FOLLOW UP DONE ON MEDICATION SUBOXONE, STILL WORKING ON MEDICATION DISCREPANCY. WILL FOLLOW 
UP.

## 2022-07-11 NOTE — NUR
RN OPENING NOTE



PATIENT AWAKE AMBULATES WITH WALKER, PATIENT IS A/O X 1-2. NON ROOM AIR, WITH NO DISTRESS OR 
SHORTNESS OF BREATH NOTED. PATIENT DENIES PAIN AT THIS TIME. PATIENT IS COOPERATIVE AND 
COMPLIANT WITH MEDICATION. PATIENT DENIES SUICIDE IDEATION AND HOMICIDAL IDEATION AT THIS 
TIME. PATIENT HAS NO NEEDS AT THIS TIME. PATIENT EDUCATED ON THE USE OF THE CALL BELL. FALL 
AND SAFETY MEASURES IN PLACE, BED ALARM ON, BED IN LOW AND LOCK POSITION, CALL LIGHT AND 
TABLE WITHIN EASY REACH, SIDE RAILS UP X2.

## 2022-07-11 NOTE — NUR
Coordination of Care:

Patient will follow up with primary doctor, Dr. Matthews located at [address] [place] [address] [address], [place] [address]; ([phone]) on July 21st at 9:15AM who will monitor and provide 
psychotropic medications.

## 2022-07-11 NOTE — NUR
EWELINA Initial Discharge Plan:

Patient currently resides at home oated at [address] [place] [id number]; 
([phone]). Patient lives with her . Patient's daughter Rain ([phone]) is 
involved in her care. Patient will be returning back home. EWELINA will work with the MD, 
treatment team, and family to help coordinate appropriate discharge.

## 2022-07-11 NOTE — NUR
Clinical Note:

Patient placed on a 5150 hold for GD. Pt had verbalized that she is feeling depressed and 
appeared disorganized on the medical surgical floor. Patient currently resides at home 
[facility] at [address] [place] [address]; [phone]). Patient lives with 
her . Patient's daughter Rain ([phone]) is involved in her care.

## 2022-07-11 NOTE — NUR
Post 1 hr Restoril effective. Pt asleep in bed easy to arouse. Frequent visual check done 
for safety. Will continue to monitor. Will endorse to next shift

## 2022-07-11 NOTE — NUR
RN NOTE



PATIENT DISCHARGED AS ORDERED. IN STABLE CONDITION AND WITH GOOD DISPOSITION. PER DAUGHTER, 
PATIENT CAME WITH SOME BELONGINGS WHICH INCLUDES T-SHIRT, SWEAT PANTS (NAVY BLUE) AND A PAIR 
OF SHOES (SUEDE, CLOSED TOES). BELONGINGS LIST SHOWED PATIENT DID NOT COME WITH ANY PERSONAL 
BELONGINGS. ISSUE WAS ELEVATED TO  KARIS. APPARENTLY, PATIENT CAME FROM 
ANOTHER DEPARTMENT, ELIO.  WILL FOLLOW UP AND DAUGHTER GIVEN THE PHONE NUMBER 
AND EXTENSION NUMBER OF THE . PATIENT ACCOMPANIED TO LOBBY BY NURSE VIA 
WHEELCHAIR. IN STABLE CONDITION. ENDORSED ACCORDINGLY.

## 2022-07-11 NOTE — NUR
RN NOTE



PATIENT'S SUBOXONE NOTED DISCREPANCY IN COUNT. CONSULTED CHARGE NURSE AND ELEVATED CONCERN 
TO PHARMACIST THALIA. MEDICATION AND MEDICATION COUNT SHEET BROUGHT TO PHARMACY AND GIVEN TO 
PHARMACIST TO CORRECTION. PENDING TODAY'S DOSE.

## 2022-07-11 NOTE — NUR
RN OPENING NOTE



RECEIVED PATIENT ALERT AND ORIENTED X 2-3. ON ROOM AIR, WITH EQUAL AND UNLABORED BREATHING 
WITH NO DISTRESS OR SHORTNESS OF BREATH NOTED SATURATING AT 96%. PATIENT DENIES PAIN AT THIS 
TIME. PATIENT DENIES SUICIDE IDEATION AND HOMICIDAL IDEATION AT THIS TIME. PATIENT HAS NO 
NEEDS AT THIS TIME. PATIENT EDUCATED ON THE USE OF THE CALL BELL. FALL AND SAFETY MEASURES 
IN PLACE, BED ALARM ON, BED IN LOW AND LOCK POSITION, CALL LIGHT AND TABLE WITHIN EASY 
REACH, SIDE RAILS UP X2. WILL CONTINUE TO MONITOR

## 2022-11-30 ENCOUNTER — HOSPITAL ENCOUNTER (EMERGENCY)
Dept: HOSPITAL 54 - ER | Age: 77
LOS: 1 days | Discharge: HOME | End: 2022-12-01
Payer: MEDICARE

## 2022-11-30 VITALS — BODY MASS INDEX: 41.95 KG/M2 | HEIGHT: 70 IN | WEIGHT: 293 LBS

## 2022-11-30 DIAGNOSIS — F17.200: ICD-10-CM

## 2022-11-30 DIAGNOSIS — S20.219A: Primary | ICD-10-CM

## 2022-11-30 DIAGNOSIS — S30.1XXA: ICD-10-CM

## 2022-11-30 DIAGNOSIS — I10: ICD-10-CM
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
## 2022-11-30 DIAGNOSIS — R79.89: ICD-10-CM

## 2022-11-30 DIAGNOSIS — G89.29: ICD-10-CM
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
## 2022-11-30 DIAGNOSIS — Z79.899: ICD-10-CM
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
## 2022-11-30 DIAGNOSIS — D72.829: ICD-10-CM

## 2022-11-30 DIAGNOSIS — M79.7: ICD-10-CM

## 2022-11-30 DIAGNOSIS — Z86.69: ICD-10-CM
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
## 2022-11-30 DIAGNOSIS — I48.91: ICD-10-CM

## 2022-11-30 DIAGNOSIS — Z90.89: ICD-10-CM

## 2022-11-30 DIAGNOSIS — E78.5: ICD-10-CM

## 2022-11-30 DIAGNOSIS — Z90.710: ICD-10-CM

## 2022-11-30 DIAGNOSIS — F41.9: ICD-10-CM

## 2022-11-30 DIAGNOSIS — E11.9: ICD-10-CM

## 2022-11-30 LAB
BASOPHILS # BLD AUTO: 0 K/UL (ref 0–0.2)
BASOPHILS NFR BLD AUTO: 0.3 % (ref 0–2)
BUN SERPL-MCNC: 35 MG/DL (ref 7–18)
CALCIUM SERPL-MCNC: 8.2 MG/DL (ref 8.5–10.1)
CHLORIDE SERPL-SCNC: 101 MMOL/L (ref 98–107)
CO2 SERPL-SCNC: 31 MMOL/L (ref 21–32)
CREAT SERPL-MCNC: 1.6 MG/DL (ref 0.6–1.3)
EOSINOPHIL NFR BLD AUTO: 0.9 % (ref 0–6)
GLUCOSE SERPL-MCNC: 101 MG/DL (ref 74–106)
HCT VFR BLD AUTO: 34 % (ref 33–45)
HGB BLD-MCNC: 9.9 G/DL (ref 11.5–14.8)
LYMPHOCYTES NFR BLD AUTO: 18.9 % (ref 20–44)
LYMPHOCYTES NFR BLD AUTO: 2.9 K/UL (ref 0.8–4.8)
MCHC RBC AUTO-ENTMCNC: 29 G/DL (ref 31–36)
MCV RBC AUTO: 89 FL (ref 82–100)
MONOCYTES NFR BLD AUTO: 1.5 K/UL (ref 0.1–1.3)
MONOCYTES NFR BLD AUTO: 9.7 % (ref 2–12)
NEUTROPHILS # BLD AUTO: 10.6 K/UL (ref 1.8–8.9)
NEUTROPHILS NFR BLD AUTO: 70.2 % (ref 43–81)
PLATELET # BLD AUTO: 293 K/UL (ref 150–450)
POTASSIUM SERPL-SCNC: 4.4 MMOL/L (ref 3.5–5.1)
RBC # BLD AUTO: 3.83 MIL/UL (ref 4–5.2)
SODIUM SERPL-SCNC: 134 MMOL/L (ref 136–145)
WBC NRBC COR # BLD AUTO: 15.1 K/UL (ref 4.3–11)

## 2022-11-30 PROCEDURE — 85730 THROMBOPLASTIN TIME PARTIAL: CPT

## 2022-11-30 PROCEDURE — 80048 BASIC METABOLIC PNL TOTAL CA: CPT

## 2022-11-30 PROCEDURE — 85025 COMPLETE CBC W/AUTO DIFF WBC: CPT

## 2022-11-30 PROCEDURE — 12001 RPR S/N/AX/GEN/TRNK 2.5CM/<: CPT

## 2022-11-30 PROCEDURE — 74176 CT ABD & PELVIS W/O CONTRAST: CPT

## 2022-11-30 PROCEDURE — A6403 STERILE GAUZE>16 <= 48 SQ IN: HCPCS

## 2022-11-30 PROCEDURE — 71250 CT THORAX DX C-: CPT

## 2022-11-30 PROCEDURE — 36415 COLL VENOUS BLD VENIPUNCTURE: CPT

## 2022-11-30 PROCEDURE — 70450 CT HEAD/BRAIN W/O DYE: CPT

## 2022-11-30 PROCEDURE — 99284 EMERGENCY DEPT VISIT MOD MDM: CPT

## 2022-11-30 NOTE — NUR
PATIENT [id number] FROM HOME C/O LEFT LOWER LEG WOUND. PATIENT IS A/O X 3, RR 
EVEN AND UNLABORED NO SOB NOTED, VSS, PATIENT IS AFEBRILE. PATINET TAKEN TO ER 
BED 16 CONNECTED TO CARIDAC AND POX MONITORS. WILL CONTINUIE TO MONITOR.

## 2022-12-01 VITALS — SYSTOLIC BLOOD PRESSURE: 108 MMHG | DIASTOLIC BLOOD PRESSURE: 68 MMHG

## 2022-12-01 NOTE — NUR
Patient discharged to home in stable condition with son. Written and verbal 
after care instructions given. Patient verbalizes understanding of instruction. 
DC via W/C.

## 2022-12-19 ENCOUNTER — HOSPITAL ENCOUNTER (EMERGENCY)
Dept: HOSPITAL 54 - ER | Age: 77
Discharge: HOME | End: 2022-12-19
Payer: MEDICARE

## 2022-12-19 VITALS — DIASTOLIC BLOOD PRESSURE: 71 MMHG | SYSTOLIC BLOOD PRESSURE: 132 MMHG

## 2022-12-19 VITALS — WEIGHT: 235 LBS | BODY MASS INDEX: 40.12 KG/M2 | HEIGHT: 64 IN

## 2022-12-19 DIAGNOSIS — G89.29: ICD-10-CM

## 2022-12-19 DIAGNOSIS — Z79.899: ICD-10-CM

## 2022-12-19 DIAGNOSIS — M79.7: ICD-10-CM

## 2022-12-19 DIAGNOSIS — F32.A: ICD-10-CM

## 2022-12-19 DIAGNOSIS — E78.5: ICD-10-CM

## 2022-12-19 DIAGNOSIS — I48.91: ICD-10-CM

## 2022-12-19 DIAGNOSIS — J44.9: ICD-10-CM

## 2022-12-19 DIAGNOSIS — Z48.02: Primary | ICD-10-CM

## 2022-12-19 DIAGNOSIS — I11.0: ICD-10-CM

## 2022-12-19 DIAGNOSIS — R60.0: ICD-10-CM

## 2022-12-19 DIAGNOSIS — Z90.710: ICD-10-CM

## 2022-12-19 DIAGNOSIS — Z90.89: ICD-10-CM

## 2022-12-19 DIAGNOSIS — F17.200: ICD-10-CM

## 2022-12-19 DIAGNOSIS — I50.9: ICD-10-CM

## 2023-01-25 ENCOUNTER — HOSPITAL ENCOUNTER (EMERGENCY)
Dept: HOSPITAL 54 - ER | Age: 78
Discharge: LEFT BEFORE DISCHARGE | End: 2023-01-25
Payer: MEDICARE

## 2023-01-25 VITALS — SYSTOLIC BLOOD PRESSURE: 123 MMHG | DIASTOLIC BLOOD PRESSURE: 67 MMHG

## 2023-01-25 VITALS — WEIGHT: 254 LBS | BODY MASS INDEX: 42.32 KG/M2 | HEIGHT: 65 IN

## 2023-01-25 DIAGNOSIS — G89.29: ICD-10-CM

## 2023-01-25 DIAGNOSIS — E78.5: ICD-10-CM

## 2023-01-25 DIAGNOSIS — Z20.822: ICD-10-CM

## 2023-01-25 DIAGNOSIS — D50.9: ICD-10-CM

## 2023-01-25 DIAGNOSIS — M54.9: ICD-10-CM

## 2023-01-25 DIAGNOSIS — E66.9: ICD-10-CM

## 2023-01-25 DIAGNOSIS — Z79.899: ICD-10-CM

## 2023-01-25 DIAGNOSIS — I11.0: Primary | ICD-10-CM

## 2023-01-25 DIAGNOSIS — Z90.710: ICD-10-CM

## 2023-01-25 DIAGNOSIS — F32.A: ICD-10-CM

## 2023-01-25 DIAGNOSIS — Z53.29: ICD-10-CM

## 2023-01-25 DIAGNOSIS — Z87.891: ICD-10-CM

## 2023-01-25 DIAGNOSIS — F03.90: ICD-10-CM

## 2023-01-25 DIAGNOSIS — I50.9: ICD-10-CM

## 2023-01-25 DIAGNOSIS — R07.9: ICD-10-CM

## 2023-01-25 DIAGNOSIS — R60.0: ICD-10-CM

## 2023-01-25 DIAGNOSIS — I48.91: ICD-10-CM

## 2023-01-25 DIAGNOSIS — M79.7: ICD-10-CM

## 2023-01-25 DIAGNOSIS — J44.9: ICD-10-CM

## 2023-01-25 DIAGNOSIS — R06.09: ICD-10-CM

## 2023-01-25 DIAGNOSIS — Z79.4: ICD-10-CM

## 2023-01-25 DIAGNOSIS — E11.40: ICD-10-CM

## 2023-01-25 DIAGNOSIS — Z79.01: ICD-10-CM

## 2023-01-25 LAB
ALBUMIN SERPL BCP-MCNC: 2.7 G/DL (ref 3.4–5)
ALP SERPL-CCNC: 125 U/L (ref 46–116)
ALT SERPL W P-5'-P-CCNC: 15 U/L (ref 12–78)
AST SERPL W P-5'-P-CCNC: 16 U/L (ref 15–37)
BASOPHILS # BLD AUTO: 0.1 K/UL (ref 0–0.2)
BASOPHILS NFR BLD AUTO: 0.5 % (ref 0–2)
BILIRUB DIRECT SERPL-MCNC: 0.1 MG/DL (ref 0–0.2)
BILIRUB SERPL-MCNC: 0.2 MG/DL (ref 0.2–1)
BUN SERPL-MCNC: 25 MG/DL (ref 7–18)
CALCIUM SERPL-MCNC: 8.8 MG/DL (ref 8.5–10.1)
CHLORIDE SERPL-SCNC: 104 MMOL/L (ref 98–107)
CO2 SERPL-SCNC: 29 MMOL/L (ref 21–32)
CREAT SERPL-MCNC: 1.4 MG/DL (ref 0.6–1.3)
EOSINOPHIL NFR BLD AUTO: 1.6 % (ref 0–6)
GLUCOSE SERPL-MCNC: 128 MG/DL (ref 74–106)
HCT VFR BLD AUTO: 25 % (ref 33–45)
HGB BLD-MCNC: 7.2 G/DL (ref 11.5–14.8)
LYMPHOCYTES NFR BLD AUTO: 1.6 K/UL (ref 0.8–4.8)
LYMPHOCYTES NFR BLD AUTO: 13.7 % (ref 20–44)
MCHC RBC AUTO-ENTMCNC: 29 G/DL (ref 31–36)
MCV RBC AUTO: 74 FL (ref 82–100)
MONOCYTES NFR BLD AUTO: 1.1 K/UL (ref 0.1–1.3)
MONOCYTES NFR BLD AUTO: 9.2 % (ref 2–12)
NEUTROPHILS # BLD AUTO: 8.7 K/UL (ref 1.8–8.9)
NEUTROPHILS NFR BLD AUTO: 75 % (ref 43–81)
PLATELET # BLD AUTO: 450 K/UL (ref 150–450)
POTASSIUM SERPL-SCNC: 4.1 MMOL/L (ref 3.5–5.1)
PROT SERPL-MCNC: 7.2 G/DL (ref 6.4–8.2)
RBC # BLD AUTO: 3.43 MIL/UL (ref 4–5.2)
SODIUM SERPL-SCNC: 139 MMOL/L (ref 136–145)
WBC NRBC COR # BLD AUTO: 11.6 K/UL (ref 4.3–11)

## 2023-01-25 PROCEDURE — 84484 ASSAY OF TROPONIN QUANT: CPT

## 2023-01-25 PROCEDURE — C9803 HOPD COVID-19 SPEC COLLECT: HCPCS

## 2023-01-25 PROCEDURE — 71045 X-RAY EXAM CHEST 1 VIEW: CPT

## 2023-01-25 PROCEDURE — 86850 RBC ANTIBODY SCREEN: CPT

## 2023-01-25 PROCEDURE — 80076 HEPATIC FUNCTION PANEL: CPT

## 2023-01-25 PROCEDURE — 87426 SARSCOV CORONAVIRUS AG IA: CPT

## 2023-01-25 PROCEDURE — 85025 COMPLETE CBC W/AUTO DIFF WBC: CPT

## 2023-01-25 PROCEDURE — 93005 ELECTROCARDIOGRAM TRACING: CPT

## 2023-01-25 PROCEDURE — 99285 EMERGENCY DEPT VISIT HI MDM: CPT

## 2023-01-25 PROCEDURE — 96374 THER/PROPH/DIAG INJ IV PUSH: CPT

## 2023-01-25 PROCEDURE — 80048 BASIC METABOLIC PNL TOTAL CA: CPT

## 2023-01-25 PROCEDURE — G0378 HOSPITAL OBSERVATION PER HR: HCPCS

## 2023-01-25 PROCEDURE — 83880 ASSAY OF NATRIURETIC PEPTIDE: CPT

## 2023-01-25 PROCEDURE — 36415 COLL VENOUS BLD VENIPUNCTURE: CPT

## 2023-01-25 NOTE — NUR
-------------------------------------------------------------------------------

           *** Note undone in [facility] - 01/25/23 at 1213 by TYSHAWN ***            

-------------------------------------------------------------------------------

report given to Clari brown PIPPA

## 2023-01-25 NOTE — NUR
RN AMA  REPORT   



RECEIVED  PATIENT  [facility]  [facility] , DUE    DX  OF  CHF  
EXACERBATION ,   A/O  X  4   , VERBALLY  RESPONSIVE   AND  ABLE  TO MAKE  NEEDS KNOWN ,    
ON ROOM  AIR AND    NO  SOB  OR  DISTRESS    NOTED  UPON ARRIVAL ,  NO C/O  OF  PAIN AND  
DISCOMFORT  , PATIENT  COMPLAINED   ABOUT  THAT HER   BUTTOM    PAJAMA  WAS  MISSING  AND  
SOMEONE  TOOK   AND  CALLING  STAFF  AS  MAO ,  ER  WAS  CALLED  AND   INFORMED   
REGARDING  THE   MISSING  PANTS AND  THEY  SAID  THAT  SHE  ONLY  HAD  THE TOP  PAJAMA AND  
DIAPER  WHEN  SHE  CAME  IN  , ONE  STAFF  WENT  TO  ER  TO  DOUBLE   CHECK  BUT  NOTHING  
FOUND  , PATIENT  TRIED  TO  GET  UP  BY  HERSELF  USING  THE   FOUR  WHEEL  WALKER   , 
AMBULATORY  USING  THE  FOUR  WHEEL  WALKER  , OFFERED  FOR  BODY CHECK AND  PATIENT   
REFUSED  , REMOVED  THE   ID  BAND AND   PULLED  OUT THE   IV  ACCESS ,  SCREAMING  AND   
YELLING   THAT SHE  WANT  TO  GO   HOME   AND  SHE  SAID SHE  DOESNT WANT   ANY  TREATMENT  
PROVIDED  TO  HER  , STAFF  FROM   LAB  CAME  TO  DRAW  BLOOD BUT  SHE  REFUSED  ,  V/S  WAS 
 TAKEN  AND  RECORDED  BP  145/64, P 88 , RR  18, T 98.0,  O2 SAT  96% ,     OFFERED  TO 
HAVE THE   TELE  BOX  BUT  REFUSED  ,  CALLED  THE   DAUGHTER   SPOKE  WITH MICAH   AND  
EXPLAIN THAT  HER  MOTHER   WANTS  TO  GO  HOME   AND  SHE  SAID  HER   DAD  WILL  DECIDE  
FOR IT  , SPOKE  WITH     STEVE AND  EXPLAINED  THAT  SHE  GONNA BE  D/C  AGAINST  
MEDICAL  ADVICE AND STEVE     AGREED  , EXPLAINED  TO  PATIENT   REGARDING  THE   AMA  
FORM  AND   AND  SHE SIGNED  AND  AGREED  TO GO  AGAINST   MEDICAL   ADVICE  ,  PATIENT  WAS 
 PIC K UP  BY    AND ASSISTED  TO  LOBBY  BY  LENORA Real RN  ,    PATIENT  LEFT   
WITH  NO  SOB  OR  ANY  DISTRESS  NOTED  AND   NO  C/ O   OF PAIN AND   DISCOMFORT  ,  LEFT  
VIA PRIVATE  CAR   WITH      AND  ALL  HER  BELONGINGS  WERE  TAKEN

## 2023-04-11 ENCOUNTER — HOSPITAL ENCOUNTER (INPATIENT)
Dept: HOSPITAL 54 - TELE | Age: 78
LOS: 3 days | Discharge: HOME HEALTH | DRG: 291 | End: 2023-04-14
Attending: NURSE PRACTITIONER | Admitting: NURSE PRACTITIONER
Payer: MEDICARE

## 2023-04-11 VITALS — BODY MASS INDEX: 41.66 KG/M2 | HEIGHT: 64 IN | WEIGHT: 244 LBS

## 2023-04-11 DIAGNOSIS — L97.919: ICD-10-CM

## 2023-04-11 DIAGNOSIS — I87.8: ICD-10-CM

## 2023-04-11 DIAGNOSIS — F41.9: ICD-10-CM

## 2023-04-11 DIAGNOSIS — I27.20: ICD-10-CM

## 2023-04-11 DIAGNOSIS — J44.1: ICD-10-CM

## 2023-04-11 DIAGNOSIS — Z79.899: ICD-10-CM

## 2023-04-11 DIAGNOSIS — Z79.01: ICD-10-CM

## 2023-04-11 DIAGNOSIS — M79.7: ICD-10-CM

## 2023-04-11 DIAGNOSIS — R60.9: ICD-10-CM

## 2023-04-11 DIAGNOSIS — M19.90: ICD-10-CM

## 2023-04-11 DIAGNOSIS — L03.116: ICD-10-CM

## 2023-04-11 DIAGNOSIS — N18.9: ICD-10-CM

## 2023-04-11 DIAGNOSIS — E78.5: ICD-10-CM

## 2023-04-11 DIAGNOSIS — I25.10: ICD-10-CM

## 2023-04-11 DIAGNOSIS — L03.115: ICD-10-CM

## 2023-04-11 DIAGNOSIS — I48.91: ICD-10-CM

## 2023-04-11 DIAGNOSIS — I13.0: Primary | ICD-10-CM

## 2023-04-11 DIAGNOSIS — G89.29: ICD-10-CM

## 2023-04-11 DIAGNOSIS — N17.9: ICD-10-CM

## 2023-04-11 DIAGNOSIS — E66.2: ICD-10-CM

## 2023-04-11 DIAGNOSIS — F33.1: ICD-10-CM

## 2023-04-11 DIAGNOSIS — Z87.891: ICD-10-CM

## 2023-04-11 DIAGNOSIS — Z90.49: ICD-10-CM

## 2023-04-11 DIAGNOSIS — L03.311: ICD-10-CM

## 2023-04-11 DIAGNOSIS — D68.59: ICD-10-CM

## 2023-04-11 DIAGNOSIS — Z90.710: ICD-10-CM

## 2023-04-11 DIAGNOSIS — J96.01: ICD-10-CM

## 2023-04-11 DIAGNOSIS — I50.31: ICD-10-CM

## 2023-04-11 DIAGNOSIS — F03.94: ICD-10-CM

## 2023-04-11 DIAGNOSIS — K83.9: ICD-10-CM

## 2023-04-11 DIAGNOSIS — E03.9: ICD-10-CM

## 2023-04-11 DIAGNOSIS — F03.93: ICD-10-CM

## 2023-04-11 PROCEDURE — A4223 INFUSION SUPPLIES W/O PUMP: HCPCS

## 2023-04-11 PROCEDURE — G0378 HOSPITAL OBSERVATION PER HR: HCPCS

## 2023-04-11 NOTE — NUR
TELE RN ADMISSION NOTE



PT BROUGHT UP VIA GURNEY WITH 2 EMTS FROM [facility] AT THIS TIME. PT DIRECT ADMISSION 
FROM [facility] TO TELE UNDER NP LUIS FOR ADMITTING DX COPD EXACERBATION. A/O X4 AND 
ABLE TO MAKE NEEDS KNOWN. PT ON O2 @ 2LPM VIA NC, TOLERATING WELL. SOB UPON EXCERTION. NO 
S/S OF RESPIRATORY DISTRESS. ON EXTERNAL CARDIAC MONITOR READING A  BPM. IV ACCESS 
RAC 20G SL, INTACT AND PATENT. AMBULATORY WITH WALKER. NOTED WITH WOUND LLE, BLE REDNESS, 
AND RUE ABRASION, WOUND CARE CONSULT ORDERED. ORIENTED TO UNIT, ROOM, AND STAFF. PT 
BELONGINGS ACCOUNTED FOR AND BELONGINGS LIST SIGNED. SAFETY PRECAUTIONS IN PLACE. BED IN 
LOWEST LOCKED POSITION, HOB ELEVATED, SIDE RAILS UP X3, BED ALARM ON, AND CALL LIGHT AND 
TABLE WITHIN REACH. ALL NEEDS MET AT THIS TIME.

## 2023-04-12 VITALS — DIASTOLIC BLOOD PRESSURE: 68 MMHG | SYSTOLIC BLOOD PRESSURE: 127 MMHG

## 2023-04-12 VITALS — DIASTOLIC BLOOD PRESSURE: 110 MMHG | SYSTOLIC BLOOD PRESSURE: 163 MMHG

## 2023-04-12 VITALS — SYSTOLIC BLOOD PRESSURE: 150 MMHG | DIASTOLIC BLOOD PRESSURE: 97 MMHG

## 2023-04-12 VITALS — SYSTOLIC BLOOD PRESSURE: 151 MMHG | DIASTOLIC BLOOD PRESSURE: 95 MMHG

## 2023-04-12 VITALS — DIASTOLIC BLOOD PRESSURE: 87 MMHG | SYSTOLIC BLOOD PRESSURE: 142 MMHG

## 2023-04-12 LAB
ALBUMIN SERPL BCP-MCNC: 3.5 G/DL (ref 3.4–5)
ALP SERPL-CCNC: 79 U/L (ref 46–116)
ALT SERPL W P-5'-P-CCNC: 23 U/L (ref 12–78)
AST SERPL W P-5'-P-CCNC: 24 U/L (ref 15–37)
BASE EXCESS BLDA CALC-SCNC: 0.9 MMOL/L
BASOPHILS # BLD AUTO: 0 K/UL (ref 0–0.2)
BASOPHILS NFR BLD AUTO: 0.1 % (ref 0–2)
BILIRUB DIRECT SERPL-MCNC: 0.1 MG/DL (ref 0–0.2)
BILIRUB SERPL-MCNC: 0.2 MG/DL (ref 0.2–1)
BUN SERPL-MCNC: 22 MG/DL (ref 7–18)
CALCIUM SERPL-MCNC: 9.4 MG/DL (ref 8.5–10.1)
CHLORIDE SERPL-SCNC: 102 MMOL/L (ref 98–107)
CO2 SERPL-SCNC: 26 MMOL/L (ref 21–32)
CREAT SERPL-MCNC: 1.5 MG/DL (ref 0.6–1.3)
DO-HGB MFR BLDA: 72.5 MMHG
EOSINOPHIL NFR BLD AUTO: 0 % (ref 0–6)
GLUCOSE SERPL-MCNC: 156 MG/DL (ref 74–106)
HCT VFR BLD AUTO: 43 % (ref 33–45)
HGB BLD-MCNC: 12.7 G/DL (ref 11.5–14.8)
INHALED O2 CONCENTRATION: 28 %
INHALED O2 FLOW RATE: 2 L/MIN (ref 0–30)
LYMPHOCYTES NFR BLD AUTO: 0.8 K/UL (ref 0.8–4.8)
LYMPHOCYTES NFR BLD AUTO: 11.9 % (ref 20–44)
MCHC RBC AUTO-ENTMCNC: 30 G/DL (ref 31–36)
MCV RBC AUTO: 85 FL (ref 82–100)
MONOCYTES NFR BLD AUTO: 0.1 K/UL (ref 0.1–1.3)
MONOCYTES NFR BLD AUTO: 1.8 % (ref 2–12)
NEUTROPHILS # BLD AUTO: 6.1 K/UL (ref 1.8–8.9)
NEUTROPHILS NFR BLD AUTO: 86.2 % (ref 43–81)
PCO2 TEMP ADJ BLDA: 39.2 MMHG (ref 35–45)
PH TEMP ADJ BLDA: 7.43 [PH] (ref 7.35–7.45)
PLATELET # BLD AUTO: 332 K/UL (ref 150–450)
PO2 TEMP ADJ BLDA: 80.9 MMHG (ref 75–100)
POTASSIUM SERPL-SCNC: 4.6 MMOL/L (ref 3.5–5.1)
PROT SERPL-MCNC: 8 G/DL (ref 6.4–8.2)
RBC # BLD AUTO: 5.05 MIL/UL (ref 4–5.2)
SAO2 % BLDA: 96 % (ref 92–98.5)
SODIUM SERPL-SCNC: 138 MMOL/L (ref 136–145)
WBC NRBC COR # BLD AUTO: 7 K/UL (ref 4.3–11)

## 2023-04-12 RX ADMIN — APIXABAN SCH MG: 5 TABLET, FILM COATED ORAL at 15:43

## 2023-04-12 RX ADMIN — Medication PRN MG: at 04:09

## 2023-04-12 RX ADMIN — BUMETANIDE SCH MG: 1 TABLET ORAL at 17:14

## 2023-04-12 RX ADMIN — Medication SCH TAB: at 12:30

## 2023-04-12 RX ADMIN — LEVOTHYROXINE SODIUM SCH MCG: 25 TABLET ORAL at 12:32

## 2023-04-12 RX ADMIN — BUMETANIDE SCH MG: 1 TABLET ORAL at 12:12

## 2023-04-12 RX ADMIN — GABAPENTIN SCH MG: 100 CAPSULE ORAL at 12:12

## 2023-04-12 RX ADMIN — Medication SCH MG: at 19:28

## 2023-04-12 RX ADMIN — Medication SCH MG: at 10:48

## 2023-04-12 RX ADMIN — DILTIAZEM HYDROCHLORIDE SCH MG: 240 CAPSULE, EXTENDED RELEASE ORAL at 12:25

## 2023-04-12 RX ADMIN — TRAZODONE HYDROCHLORIDE PRN MG: 50 TABLET ORAL at 02:27

## 2023-04-12 RX ADMIN — ATORVASTATIN CALCIUM SCH MG: 10 TABLET, FILM COATED ORAL at 21:04

## 2023-04-12 RX ADMIN — TRAZODONE HYDROCHLORIDE SCH MG: 50 TABLET ORAL at 21:04

## 2023-04-12 RX ADMIN — Medication SCH MG: at 15:13

## 2023-04-12 RX ADMIN — MAGNESIUM OXIDE TAB 400 MG (241.3 MG ELEMENTAL MG) SCH MG: 400 (241.3 MG) TAB at 12:12

## 2023-04-12 RX ADMIN — GABAPENTIN SCH MG: 100 CAPSULE ORAL at 17:14

## 2023-04-12 RX ADMIN — CALCIUM SCH MG: 500 TABLET ORAL at 12:12

## 2023-04-12 RX ADMIN — APIXABAN SCH MG: 5 TABLET, FILM COATED ORAL at 21:06

## 2023-04-12 RX ADMIN — PANTOPRAZOLE SODIUM SCH MG: 40 TABLET, DELAYED RELEASE ORAL at 08:18

## 2023-04-12 RX ADMIN — TRAZODONE HYDROCHLORIDE PRN MG: 50 TABLET ORAL at 23:55

## 2023-04-12 NOTE — NUR
TELE RN OPENING NOTE



RECEIVED PT AWAKE IN BED. A/O X4 AND ABLE TO MAKE NEEDS KNOWN. PT ON O2 @ 2LPM VIA NC, 
TOLERATING WELL.  PT HAD BM THIS AM. NO S/S OF RESPIRATORY DISTRESS. ON EXTERNAL CARDIAC 
MONITOR READING A FIB 84 BPM. IV ACCESS RAC 20G SL, INTACT AND PATENT. KEPT CLEAN AND DRY. 
ALL DUE MEDS GIVEN A ORDERED. SAFETY PRECAUTIONS IN PLACE AT ALL TIMES. BED IN LOWEST LOCKED 
POSITION, HOB ELEVATED, SIDE RAILS UP X3, BED ALARM ON, AND CALL LIGHT AND TABLE WITHIN 
REACH. WILL ADMINISTER SCHEDULED MEDS AND CONTINUE TO MONITOR PT.

## 2023-04-12 NOTE — NUR
RN NOTE



PT REQUESTED SLEEPING MEDICATION. ADMINISTERED TRAZODONE 100 MG FOR SLEEP AS ORDERED. MADE 
COMFORTABLE IN BED. ALL NEEDS MET AT THIS TIME.

## 2023-04-12 NOTE — NUR
TELE RN CLOSING NOTE



PT AWAKE RESTING IN BED. A/O X4 AND ABLE TO MAKE NEEDS KNOWN. PT STABLE ON ROOM AIR, 
TOLERATING WELL. NO S/S OF RESPIRATORY DISTRESS. ON EXTERNAL CARDIAC MONITOR READING A FIB 
88 BPM. IV ACCESS RAC 20G SL, INTACT AND PATENT. KEPT CLEAN AND DRY. ALL DUE MEDS GIVEN A 
ORDERED. SAFETY PRECAUTIONS IN PLACE AT ALL TIMES. BED IN LOWEST LOCKED POSITION, HOB 
ELEVATED, SIDE RAILS UP X3, BED ALARM ON, AND CALL LIGHT AND TABLE WITHIN REACH. ALL MEDS 
ADMINISTERED AS PRESCRIBED. WILL ENDORSE TO NEXT SHIFT

## 2023-04-12 NOTE — NUR
RN NOTE



PT COMPLAINED OF PAIN 8/10 OF THE ABDOMEN. ADMINISTERED MORPHINE 4 MG IV FOR SEVERE PAIN AS 
ORDERED. MADE COMFORTABLE IN BED. ALL NEEDS MET AT THIS TIME.

## 2023-04-12 NOTE — NUR
TELE RN CLOSING NOTE



PT AWAKE IN BED. A/O X4 AND ABLE TO MAKE NEEDS KNOWN. PT ON O2 @ 2LPM VIA NC, TOLERATING 
WELL. SOB UPON EXCERTION. NO S/S OF RESPIRATORY DISTRESS. ON EXTERNAL CARDIAC MONITOR 
READING A  BPM. IV ACCESS RAC 20G SL, INTACT AND PATENT. KEPT CLEAN AND DRY. ALL DUE 
MEDS GIVEN A ORDERED. MED RECON NOT COMPLETE, AWAITING DAUGHTER GREG CALL BACK TO VERIFY 
MEDICATION, WILL ENDORSE TO NEXT SHIFT. SAFETY PRECAUTIONS IN PLACE AT ALL TIMES. BED IN 
LOWEST LOCKED POSITION, HOB ELEVATED, SIDE RAILS UP X3, BED ALARM ON, AND CALL LIGHT AND 
TABLE WITHIN REACH. ALL NEEDS MET AT THIS TIME AND WILL ENDORSE TO ONCOMING NURSE FOR PIPPA.

## 2023-04-12 NOTE — NUR
TELE RN OPENING NOTE



RECEIVED PT AWAKE, SITTING IN BSC. PT A/O X4, AND ABLE TO MAKE NEEDS KNOWN. PT ON RA, 
TOLERATING WELL. NO S/S OF RESPIRATORY DISTRESS NOTED. ON EXTERNAL CARDIAC MONITOR READING A 
FIB 86 BPM. IV ACCESS TO RIGHT AC 20G SL, INTACT AND PATENT. SAFETY PRECAUTIONS IN PLACE. 
BED IN LOWEST LOCKED POSITION, HOB ELEVATED, SIDE RAILS UP X2, BED ALARM ON, AND CALL LIGHT 
AND TABLE WITHIN REACH. WILL CONTINUE TO MONITOR PT.

## 2023-04-12 NOTE — NUR
WOUND CARE CONSULT: PT PRESENTS WITH REDNESS AND EDEMA TO LOWER LEGS AND FEET WITH FRAGILE 
AREAS TO LOWER LEGS AND VARICOSE VEINS, PRESENT ON ADMISSION. PT ALSO HAS EXCORIATED AREA TO 
RT THIGH WHERE SHE STATES SHE SCRATCHED HER SKIN. NO DRAINAGE OR ERYTHEMA NOTED. DR LAZO 
CALLED FOR DPM CONSULT. MD IN AGREEMENT WITH PLAN OF CARE.

## 2023-04-12 NOTE — NUR
RN NOTE



CALLED AND LEFT MESSAGE FOR DAUGHTER GREG PER PATIENT REQUEST. PT UNABLE TO REMEMBER THE 
DOSAGE OF ALL THE MEDICATION SHE TAKES AT HOME AND STATED "MY DAUGHTER WOULD KNOW. SHE 
PREPARES MY MEDICATION FOR ME EVERYDAY". MED RECON UNABLE TO BE COMPLETED AT THIS TIME. 
GREG TAYLOR [phone]

## 2023-04-13 VITALS — DIASTOLIC BLOOD PRESSURE: 98 MMHG | SYSTOLIC BLOOD PRESSURE: 130 MMHG

## 2023-04-13 VITALS — SYSTOLIC BLOOD PRESSURE: 148 MMHG | DIASTOLIC BLOOD PRESSURE: 84 MMHG

## 2023-04-13 VITALS — DIASTOLIC BLOOD PRESSURE: 90 MMHG | SYSTOLIC BLOOD PRESSURE: 152 MMHG

## 2023-04-13 VITALS — DIASTOLIC BLOOD PRESSURE: 102 MMHG | SYSTOLIC BLOOD PRESSURE: 142 MMHG

## 2023-04-13 LAB
BASOPHILS # BLD AUTO: 0 K/UL (ref 0–0.2)
BASOPHILS NFR BLD AUTO: 0.1 % (ref 0–2)
BILIRUB UR QL STRIP: NEGATIVE
BUN SERPL-MCNC: 23 MG/DL (ref 7–18)
CALCIUM SERPL-MCNC: 8.8 MG/DL (ref 8.5–10.1)
CHLORIDE SERPL-SCNC: 103 MMOL/L (ref 98–107)
CO2 SERPL-SCNC: 27 MMOL/L (ref 21–32)
COLOR UR: YELLOW
CREAT SERPL-MCNC: 1.3 MG/DL (ref 0.6–1.3)
EOSINOPHIL NFR BLD AUTO: 0 % (ref 0–6)
GLUCOSE SERPL-MCNC: 183 MG/DL (ref 74–106)
GLUCOSE UR STRIP-MCNC: NEGATIVE MG/DL
HCT VFR BLD AUTO: 39 % (ref 33–45)
HGB BLD-MCNC: 11.7 G/DL (ref 11.5–14.8)
LEUKOCYTE ESTERASE UR QL STRIP: NEGATIVE
LYMPHOCYTES NFR BLD AUTO: 0.6 K/UL (ref 0.8–4.8)
LYMPHOCYTES NFR BLD AUTO: 4.1 % (ref 20–44)
MAGNESIUM SERPL-MCNC: 2.4 MG/DL (ref 1.8–2.4)
MCHC RBC AUTO-ENTMCNC: 30 G/DL (ref 31–36)
MCV RBC AUTO: 84 FL (ref 82–100)
MONOCYTES NFR BLD AUTO: 0.5 K/UL (ref 0.1–1.3)
MONOCYTES NFR BLD AUTO: 3.5 % (ref 2–12)
NEUTROPHILS # BLD AUTO: 12.6 K/UL (ref 1.8–8.9)
NEUTROPHILS NFR BLD AUTO: 92.3 % (ref 43–81)
NITRITE UR QL STRIP: NEGATIVE
PH UR STRIP: 7 [PH] (ref 5–8)
PHOSPHATE SERPL-MCNC: 3.4 MG/DL (ref 2.5–4.9)
PLATELET # BLD AUTO: 280 K/UL (ref 150–450)
POTASSIUM SERPL-SCNC: 4.1 MMOL/L (ref 3.5–5.1)
PROT UR QL STRIP: NEGATIVE MG/DL
RBC # BLD AUTO: 4.63 MIL/UL (ref 4–5.2)
RBC #/AREA URNS HPF: (no result) /HPF (ref 0–2)
SODIUM SERPL-SCNC: 139 MMOL/L (ref 136–145)
UROBILINOGEN UR STRIP-MCNC: 0.2 EU/DL
WBC #/AREA URNS HPF: (no result) /HPF (ref 0–3)
WBC NRBC COR # BLD AUTO: 13.6 K/UL (ref 4.3–11)

## 2023-04-13 RX ADMIN — Medication SCH GM: at 17:39

## 2023-04-13 RX ADMIN — FLUOCINONIDE SCH GM: 0.5 CREAM TOPICAL at 09:00

## 2023-04-13 RX ADMIN — MAGNESIUM OXIDE TAB 400 MG (241.3 MG ELEMENTAL MG) SCH MG: 400 (241.3 MG) TAB at 08:50

## 2023-04-13 RX ADMIN — Medication SCH MG: at 13:46

## 2023-04-13 RX ADMIN — Medication PRN MG: at 00:01

## 2023-04-13 RX ADMIN — Medication SCH TAB: at 08:51

## 2023-04-13 RX ADMIN — FLUOCINONIDE SCH GM: 0.5 CREAM TOPICAL at 17:39

## 2023-04-13 RX ADMIN — APIXABAN SCH MG: 5 TABLET, FILM COATED ORAL at 20:09

## 2023-04-13 RX ADMIN — FUROSEMIDE SCH MG: 10 INJECTION, SOLUTION INTRAMUSCULAR; INTRAVENOUS at 17:39

## 2023-04-13 RX ADMIN — Medication SCH MG: at 20:04

## 2023-04-13 RX ADMIN — FUROSEMIDE SCH MG: 10 INJECTION, SOLUTION INTRAMUSCULAR; INTRAVENOUS at 13:42

## 2023-04-13 RX ADMIN — Medication SCH GM: at 09:00

## 2023-04-13 RX ADMIN — BUMETANIDE SCH MG: 1 TABLET ORAL at 08:51

## 2023-04-13 RX ADMIN — TRAZODONE HYDROCHLORIDE SCH MG: 50 TABLET ORAL at 21:12

## 2023-04-13 RX ADMIN — FUROSEMIDE SCH MG: 10 INJECTION, SOLUTION INTRAMUSCULAR; INTRAVENOUS at 10:36

## 2023-04-13 RX ADMIN — ATORVASTATIN CALCIUM SCH MG: 10 TABLET, FILM COATED ORAL at 21:12

## 2023-04-13 RX ADMIN — APIXABAN SCH MG: 5 TABLET, FILM COATED ORAL at 08:57

## 2023-04-13 RX ADMIN — CALCIUM SCH MG: 500 TABLET ORAL at 08:51

## 2023-04-13 RX ADMIN — VENLAFAXINE HYDROCHLORIDE SCH MG: 75 CAPSULE, EXTENDED RELEASE ORAL at 08:50

## 2023-04-13 RX ADMIN — Medication SCH MG: at 07:35

## 2023-04-13 RX ADMIN — DILTIAZEM HYDROCHLORIDE SCH MG: 240 CAPSULE, EXTENDED RELEASE ORAL at 08:51

## 2023-04-13 RX ADMIN — DEXTROSE MONOHYDRATE SCH MLS/HR: 50 INJECTION, SOLUTION INTRAVENOUS at 14:50

## 2023-04-13 RX ADMIN — ALLOPURINOL SCH MG: 100 TABLET ORAL at 08:51

## 2023-04-13 RX ADMIN — LEVOTHYROXINE SODIUM SCH MCG: 25 TABLET ORAL at 07:50

## 2023-04-13 RX ADMIN — Medication SCH MG: at 01:30

## 2023-04-13 RX ADMIN — PANTOPRAZOLE SODIUM SCH MG: 40 TABLET, DELAYED RELEASE ORAL at 07:50

## 2023-04-13 NOTE — NUR
TELE RN CLOSING NOTE



PATIENT IN BED, A/O X 4, ABLE TO MAKE NEEDS KNOWN; STABLE ON ROOM AIR, TOLERATING WELL, 
BREATHING EVENLY AND NO S/S OF DISTRESS NOTED; WITH IV ACCESS ON RAC G#20 - SALINE LOCK, 
INTACT AND PATENT, FLUSHING WELL; WITH PRESENCE OF SITTER AT BEDSIDE; HAD EPISODE OF NERVOUS 
BREAKDOWN AS VERBALIZED BY PATIENT BUT WAS ABLE TO CALM DOWN IMMEDIATELY AND FELL ASLEEP; 
ADMINISTERED MEDICATIONS AS PRESCRIBED, PATIENT'S NEEDS ATTENDED; MONITORED PATIENT 
ACCORDINGLY; SAFETY MEASURES IMPLEMENTED, BED IN LOW AND LOCKED POSITION, SIDE RAILS UP X 2, 
CALL LIGHT WITHIN REACH; WILL ENDORSE TO AM NURSE FOR PIPPA.

## 2023-04-13 NOTE — NUR
TELE RN OPENING NOTES



RECEIVED PATIENT AWAKE IN CHAIR, A/Ox4, ABLE TO MAKE NEEDS KNOWN. SITTER AT BEDSIDE. ON ROOM 
AIR, NO S/S OF RESPIRATORY DISTRESS. ON TELE MONITORING SHOWING A-FIB . IV ACCESS RAC 
#20G S/L. INTACT AND PATENT. NO C/O OF CHEST PAIN OR DISCOMFORT. AMBULATORY WITH 
WALKER/ASSIST, USES BEDSIDE COMMODE. SKIN ISSUES: L LEG WOUND, BLE REDNESS, R ARM ABRASION. 
SAFETY MEASURES IN PLACE: BED LOCKED AND IN LOWEST POSITION, HOB ELEVATED, CALL LIGHT WITHIN 
REACH, SIDE RAILS UPx2. WILL CONTINUE TO MONITOR.

## 2023-04-13 NOTE — NUR
RN NOTES



LAC HYDRIN AND LIDEX NOT AVAILABLE ON UNIT. NOTIFIED PHARMACY x2, WILL AWAIT FOR MEDICATION.

## 2023-04-13 NOTE — NUR
TELE RN NOTE



RECEIVED PATIENT FROM PASTOR RN; PATIENT IS A/O X 4, ABLE TO MAKE NEEDS KNOWN; STABLE ON ROOM 
AIR, TOLERATING WELL BREATHING EVENLY AND NO S/S OF DISTRESS NOTED; WITH IV ACCESS ON RAC 
G#20 - SALINE LOCK; ENCOURAGED VERBALIZATION OF NEEDS; WITH PRESENCE OF SITTER AT BEDSIDE; 
SAFETY MEASURES IMPLEMENTED, BED IN LOW AND LOCKED POSITION, SIDE RAILS UP X 2, CALL LIGHT 
WITHIN REACH; WILL CONTINUE TO MONITOR THROUGHOUT SHIFT

## 2023-04-13 NOTE — NUR
TELE RN OPENING NOTE



RECEIVED PATIENT IN BED, AWAKE, ALERT AND ORIENTED X4. ABLE TO COMMUNICATE NEEDS WITH THE 
STAFFS. AFEBRILE AND NOT IN ANY FORM OF ACUTE DISTRESS. BREATHING EVEN AND NON LABORED. ON 
TELE MONITORING WITH CURRENT READING OF A-. WITH IV ACCESS ON RAC 20G-SL. SAFETY 
MEASURES IN PLACE. KEPT BED IN LOCKED AND IN LOW POSITION. SIDE RAILS UP X2. ADVISED TO USE 
THE CALL LIGHT WHEN IN NEED OF ASSISTANCE.

## 2023-04-13 NOTE — NUR
TELE RN NOTE



PATIENT NOTED WITH AGITATION AND C/O THAT SHE'S UNABLE TO SLEEP DESPITE TAKING HER ROUTINE 
TRAZODONE. ADMINISTERED PRN CLONAZEPAM TO CALM HER DOWN.

## 2023-04-13 NOTE — NUR
TELE RN CLOSING NOTES



PATIENT AWAKE IN BED, A/Ox4, ABLE TO MAKE NEEDS KNOWN. STABLE ON ROOM AIR, NO S/S OF 
RESPIRATORY DISTRESS. ON TELE MONITORING SHOWING A-FIB . IV ACCESS RAC #20G S/L. 
INTACT AND PATENT. NO C/O OF CHEST PAIN OR DISCOMFORT. AMBULATORY WITH WALKER/ASSIST, USES 
BEDSIDE COMMODE. SKIN ISSUES: L LEG WOUND, BLE REDNESS, R ARM ABRASION. SAFETY MEASURES 
MAINTAINED: BED LOCKED AND IN LOWEST POSITION, HOB ELEVATED, CALL LIGHT WITHIN REACH, SIDE 
RAILS UPx2. WILL ENDORSE TO NEXT SHIFT ANY IPPPA.

## 2023-04-14 VITALS — DIASTOLIC BLOOD PRESSURE: 80 MMHG | SYSTOLIC BLOOD PRESSURE: 144 MMHG

## 2023-04-14 VITALS — SYSTOLIC BLOOD PRESSURE: 138 MMHG | DIASTOLIC BLOOD PRESSURE: 102 MMHG

## 2023-04-14 VITALS — SYSTOLIC BLOOD PRESSURE: 144 MMHG | DIASTOLIC BLOOD PRESSURE: 101 MMHG

## 2023-04-14 LAB
BASOPHILS # BLD AUTO: 0 K/UL (ref 0–0.2)
BASOPHILS NFR BLD AUTO: 0.1 % (ref 0–2)
BUN SERPL-MCNC: 32 MG/DL (ref 7–18)
CALCIUM SERPL-MCNC: 9.1 MG/DL (ref 8.5–10.1)
CHLORIDE SERPL-SCNC: 100 MMOL/L (ref 98–107)
CO2 SERPL-SCNC: 26 MMOL/L (ref 21–32)
CREAT SERPL-MCNC: 1.4 MG/DL (ref 0.6–1.3)
EOSINOPHIL NFR BLD AUTO: 0 % (ref 0–6)
GLUCOSE SERPL-MCNC: 150 MG/DL (ref 74–106)
HCT VFR BLD AUTO: 43 % (ref 33–45)
HGB BLD-MCNC: 13.1 G/DL (ref 11.5–14.8)
LYMPHOCYTES NFR BLD AUTO: 0.7 K/UL (ref 0.8–4.8)
LYMPHOCYTES NFR BLD AUTO: 6.1 % (ref 20–44)
MAGNESIUM SERPL-MCNC: 2.5 MG/DL (ref 1.8–2.4)
MCHC RBC AUTO-ENTMCNC: 30 G/DL (ref 31–36)
MCV RBC AUTO: 84 FL (ref 82–100)
MONOCYTES NFR BLD AUTO: 0.4 K/UL (ref 0.1–1.3)
MONOCYTES NFR BLD AUTO: 3.6 % (ref 2–12)
NEUTROPHILS # BLD AUTO: 10.3 K/UL (ref 1.8–8.9)
NEUTROPHILS NFR BLD AUTO: 90.2 % (ref 43–81)
PHOSPHATE SERPL-MCNC: 3.8 MG/DL (ref 2.5–4.9)
PLATELET # BLD AUTO: 343 K/UL (ref 150–450)
POTASSIUM SERPL-SCNC: 4.2 MMOL/L (ref 3.5–5.1)
RBC # BLD AUTO: 5.13 MIL/UL (ref 4–5.2)
SODIUM SERPL-SCNC: 137 MMOL/L (ref 136–145)
WBC NRBC COR # BLD AUTO: 11.4 K/UL (ref 4.3–11)

## 2023-04-14 RX ADMIN — APIXABAN SCH MG: 5 TABLET, FILM COATED ORAL at 08:50

## 2023-04-14 RX ADMIN — Medication SCH MG: at 13:17

## 2023-04-14 RX ADMIN — ALLOPURINOL SCH MG: 100 TABLET ORAL at 08:47

## 2023-04-14 RX ADMIN — FLUOCINONIDE SCH GM: 0.5 CREAM TOPICAL at 08:52

## 2023-04-14 RX ADMIN — PANTOPRAZOLE SODIUM SCH MG: 40 TABLET, DELAYED RELEASE ORAL at 08:48

## 2023-04-14 RX ADMIN — Medication SCH MG: at 01:15

## 2023-04-14 RX ADMIN — VENLAFAXINE HYDROCHLORIDE SCH MG: 75 CAPSULE, EXTENDED RELEASE ORAL at 08:48

## 2023-04-14 RX ADMIN — DEXTROSE MONOHYDRATE SCH MLS/HR: 50 INJECTION, SOLUTION INTRAVENOUS at 13:26

## 2023-04-14 RX ADMIN — Medication SCH MG: at 07:36

## 2023-04-14 RX ADMIN — Medication SCH GM: at 08:52

## 2023-04-14 RX ADMIN — Medication SCH TAB: at 08:48

## 2023-04-14 RX ADMIN — LEVOTHYROXINE SODIUM SCH MCG: 25 TABLET ORAL at 08:47

## 2023-04-14 RX ADMIN — CALCIUM SCH MG: 500 TABLET ORAL at 08:52

## 2023-04-14 RX ADMIN — MAGNESIUM OXIDE TAB 400 MG (241.3 MG ELEMENTAL MG) SCH MG: 400 (241.3 MG) TAB at 08:47

## 2023-04-14 RX ADMIN — Medication PRN MG: at 08:56

## 2023-04-14 RX ADMIN — DILTIAZEM HYDROCHLORIDE SCH MG: 240 CAPSULE, EXTENDED RELEASE ORAL at 08:51

## 2023-04-14 NOTE — NUR
TELE RN CLOSING NOTE



PATIENT IN BED, SLEEPING INTERMITTENTLY. ABLE TO COMMUNICATE NEEDS WITH THE STAFFS. AFEBRILE 
AND NOT IN ANY FORM OF ACUTE DISTRESS. BREATHING EVEN AND NON LABORED. ON TELE MONITORING 
WITH CURRENT READING OF A-. WITH IV ACCESS ON RAC 20G-SL. MEDICATED AS ORDERED. 
SAFETY MEASURES IN PLACE. KEPT BED IN LOCKED AND IN LOW POSITION. SIDE RAILS UP X2. ADVISED 
TO USE THE CALL LIGHT WHEN IN NEED OF ASSISTANCE. ALL NURSING NEEDS ATTENDED. ENDORSED TO 
INCOMING SHIFT FOR CONTINUITY OF CARE.

## 2023-04-14 NOTE — NUR
TELE LVN OPENING NOTES



RECEIVED PATIENT AWAKE IN BED , A/Ox4, ABLE TO MAKE NEEDS KNOWN. ON ROOM AIR, NO S/S OF 
RESPIRATORY DISTRESS. ON TELE MONITORING SHOWING A-FIB . IV ACCESS RAC #20G S/L. 
INTACT AND PATENT. NO C/O OF CHEST PAIN OR DISCOMFORT. AMBULATORY WITH WALKER/ASSIST, USES 
BEDSIDE COMMODE. SKIN ISSUES: L LEG WOUND, BLE REDNESS, R ARM AND HIP ABRASION. PATIENT 
COMPLAINING OF PAIN IN ABDOMEN. WILL GIVE MEDICATION AS SCHEDULED. SAFETY MEASURES IN PLACE: 
BED LOCKED AND IN LOWEST POSITION, HOB ELEVATED, CALL LIGHT WITHIN REACH, SIDE RAILS UPx2.

## 2023-04-14 NOTE — NUR
LVN NOTES



PATIENT REQUESTED PRN MILK OF MAGNESIA, DID NOT WANT TO WAIT FOR BEDTIME TO TAKE. 
ADMINISTERED MOM AS PATIENT SAID SHE HAS NOT HAD A BOWEL MOVEMENT. PATIENT NOTED AGITATED 
AND THROWING WATER AND CHUCKS, REORIENTED AND CALMED. ADMINISTERED ABILIFY 2MG PER MD ORDER.

## 2023-04-14 NOTE — NUR
LVN DISCHARGE NOTE 



PATIENT DISCHARGED IN A STABLE MEDICAL CONDITION. PATIENT IS BREATHING EVENLY, NO S/S OF 
SOB. A/OX4 ABLE TO MAKE NEEDS KNOWN. NO COMPLAINTS OF PAIN AT THIS TIME. IV ACCESS REMOVED 
AND CATHETER TIP INTACT, PRESSURE DRESSING APPLIED, NO SIGNS OF BLEEDING NOTED. NAME ARM 
BAND REMOVED. SKIN ASSESSMENT WAS DONE AND PICTURE TAKEN AND PLACED IN THE PATIENT CHART. 
HEALTH TEACHING AND DISCHARGED INSTRUCTIONS WERE GIVEN AND VERBALIZED UNDERSTANDING. PATIENT 
BELONGING WERE ACCOUNTED FOR AND BELONGING SHEET WAS SIGNED. PATIENT WAS ACCOMPANIED BY WITH 
CNA TO LOBBY AND LEFT IN FAMILY CAR/ WITH HER DAUGHTER..

## 2023-04-14 NOTE — NUR
RN NOTES



PATIENT COMPLAINING OF LOWER ABDOMEN PAIN, PRN MORPHINE ADMINISTERED FOR PAIN 9/10. WILL 
CONTINUE TO MONITOR.

## 2023-05-04 ENCOUNTER — HOSPITAL ENCOUNTER (INPATIENT)
Dept: HOSPITAL 54 - ER | Age: 78
LOS: 4 days | Discharge: HOME HEALTH | DRG: 602 | End: 2023-05-08
Attending: INTERNAL MEDICINE | Admitting: INTERNAL MEDICINE
Payer: MEDICARE

## 2023-05-04 VITALS — WEIGHT: 250 LBS | BODY MASS INDEX: 40.18 KG/M2 | HEIGHT: 66 IN

## 2023-05-04 DIAGNOSIS — E11.22: ICD-10-CM

## 2023-05-04 DIAGNOSIS — Z20.822: ICD-10-CM

## 2023-05-04 DIAGNOSIS — Z87.891: ICD-10-CM

## 2023-05-04 DIAGNOSIS — Z79.01: ICD-10-CM

## 2023-05-04 DIAGNOSIS — N18.9: ICD-10-CM

## 2023-05-04 DIAGNOSIS — L03.115: Primary | ICD-10-CM

## 2023-05-04 DIAGNOSIS — Z90.710: ICD-10-CM

## 2023-05-04 DIAGNOSIS — D68.59: ICD-10-CM

## 2023-05-04 DIAGNOSIS — I25.10: ICD-10-CM

## 2023-05-04 DIAGNOSIS — I48.91: ICD-10-CM

## 2023-05-04 DIAGNOSIS — I50.33: ICD-10-CM

## 2023-05-04 DIAGNOSIS — E03.9: ICD-10-CM

## 2023-05-04 DIAGNOSIS — Z98.890: ICD-10-CM

## 2023-05-04 DIAGNOSIS — F03.94: ICD-10-CM

## 2023-05-04 DIAGNOSIS — E66.01: ICD-10-CM

## 2023-05-04 DIAGNOSIS — M79.7: ICD-10-CM

## 2023-05-04 DIAGNOSIS — E87.6: ICD-10-CM

## 2023-05-04 DIAGNOSIS — F03.93: ICD-10-CM

## 2023-05-04 DIAGNOSIS — E78.5: ICD-10-CM

## 2023-05-04 DIAGNOSIS — E11.40: ICD-10-CM

## 2023-05-04 DIAGNOSIS — I13.0: ICD-10-CM

## 2023-05-04 DIAGNOSIS — J44.1: ICD-10-CM

## 2023-05-04 DIAGNOSIS — Z90.49: ICD-10-CM

## 2023-05-04 DIAGNOSIS — L03.116: ICD-10-CM

## 2023-05-04 DIAGNOSIS — G47.33: ICD-10-CM

## 2023-05-04 DIAGNOSIS — G89.29: ICD-10-CM

## 2023-05-04 DIAGNOSIS — Z79.899: ICD-10-CM

## 2023-05-04 LAB
ALBUMIN SERPL BCP-MCNC: 3 G/DL (ref 3.4–5)
ALP SERPL-CCNC: 70 U/L (ref 46–116)
ALT SERPL W P-5'-P-CCNC: 23 U/L (ref 12–78)
AST SERPL W P-5'-P-CCNC: 17 U/L (ref 15–37)
BASOPHILS # BLD AUTO: 0 K/UL (ref 0–0.2)
BASOPHILS NFR BLD AUTO: 0.5 % (ref 0–2)
BILIRUB DIRECT SERPL-MCNC: 0 MG/DL (ref 0–0.2)
BILIRUB SERPL-MCNC: 0.2 MG/DL (ref 0.2–1)
BUN SERPL-MCNC: 31 MG/DL (ref 7–18)
CALCIUM SERPL-MCNC: 9.5 MG/DL (ref 8.5–10.1)
CHLORIDE SERPL-SCNC: 105 MMOL/L (ref 98–107)
CO2 SERPL-SCNC: 34 MMOL/L (ref 21–32)
CREAT SERPL-MCNC: 1.6 MG/DL (ref 0.6–1.3)
EOSINOPHIL NFR BLD AUTO: 2.7 % (ref 0–6)
EOSINOPHIL NFR BLD MANUAL: 3 % (ref 0–4)
GLUCOSE SERPL-MCNC: 118 MG/DL (ref 74–106)
HCT VFR BLD AUTO: 39 % (ref 33–45)
HGB BLD-MCNC: 11.9 G/DL (ref 11.5–14.8)
LYMPHOCYTES NFR BLD AUTO: 1.5 K/UL (ref 0.8–4.8)
LYMPHOCYTES NFR BLD AUTO: 19.6 % (ref 20–44)
LYMPHOCYTES NFR BLD MANUAL: 17 % (ref 16–48)
MCHC RBC AUTO-ENTMCNC: 30 G/DL (ref 31–36)
MCV RBC AUTO: 88 FL (ref 82–100)
MONOCYTES NFR BLD AUTO: 0.6 K/UL (ref 0.1–1.3)
MONOCYTES NFR BLD AUTO: 8 % (ref 2–12)
MONOCYTES NFR BLD MANUAL: 4 % (ref 0–11)
NEUTROPHILS # BLD AUTO: 5.3 K/UL (ref 1.8–8.9)
NEUTROPHILS NFR BLD AUTO: 69.2 % (ref 43–81)
NEUTS SEG NFR BLD MANUAL: 76 % (ref 42–76)
PLATELET # BLD AUTO: 229 K/UL (ref 150–450)
POTASSIUM SERPL-SCNC: 4.5 MMOL/L (ref 3.5–5.1)
PROT SERPL-MCNC: 6.8 G/DL (ref 6.4–8.2)
RBC # BLD AUTO: 4.46 MIL/UL (ref 4–5.2)
SODIUM SERPL-SCNC: 142 MMOL/L (ref 136–145)
WBC NRBC COR # BLD AUTO: 7.7 K/UL (ref 4.3–11)

## 2023-05-04 PROCEDURE — G0378 HOSPITAL OBSERVATION PER HR: HCPCS

## 2023-05-04 PROCEDURE — A4223 INFUSION SUPPLIES W/O PUMP: HCPCS

## 2023-05-04 PROCEDURE — C9803 HOPD COVID-19 SPEC COLLECT: HCPCS

## 2023-05-04 NOTE — NUR
TELE RN ADMITTING NOTE



PT ARRIVED TO UNIT @ 2340 VIA GURNEY, ASSISTED BY ER STAFF, ABLE TO MOVE SELF TO UNIT BED. 
A/O X4, ABLE TO MAKE NEEDS KNOWN, COOPERATIVE. ON RA WITH NO SOB OR DISTRESS NOTED. DENIES 
CHEST PAIN. VITAL SIGNS: 114/84 BP, 82 HR, 20 RR, 97.9 F, 94% O2. WEIGHT: 250 LB. BLOOD 
SUGAR: 87. PLACED ON CARDIAC MONITOR SHOWING CONTROLLED AFIB, 82 HR. PT STATES HAVING SOME 
WHEEZING, BREATH SOUNDS AUSCULTATED, WNL, NO WHEEZING NOTED. CIRCULATION WNL. BILATERAL 
LOWER LEGS SWOLLEN, 2+ PITTING, C/O PAIN WHEN BEING POKED DURING ASSESSMENT. SKIN ASSESSMENT 
PERFORMED, ISSUES NOTED AND DOCUMENTED IN CHART. BELONGINGS CHECKED AND DOCUMENTED. PT 
ORIENTED TO UNIT AND HOW TO USE CALL LIGHT. MADE COMFORTABLE, PLACED IN SEMI-FOWLERS 
POSITION. PT ASKING FOR SNACKS AND WANTING TO PEE AT THIS TIME. SAFETY PRECAUTIONS PUT IN 
PLACE: BED LOCKED AND IN LOWEST POSITION, BED ALARM ON, SIDE RAILS UP X3, CALL LIGHT AND 
TRAY TABLE WITHIN REACH. WILL CONTINUE TO MONITOR AND ASSIST.

## 2023-05-04 NOTE — NUR
RECEIVED PT 77 YRS FEMALECAME BY GINA FROM [facility]

## 2023-05-05 VITALS — DIASTOLIC BLOOD PRESSURE: 69 MMHG | SYSTOLIC BLOOD PRESSURE: 130 MMHG

## 2023-05-05 VITALS — SYSTOLIC BLOOD PRESSURE: 121 MMHG | DIASTOLIC BLOOD PRESSURE: 89 MMHG

## 2023-05-05 VITALS — SYSTOLIC BLOOD PRESSURE: 123 MMHG | DIASTOLIC BLOOD PRESSURE: 78 MMHG

## 2023-05-05 VITALS — SYSTOLIC BLOOD PRESSURE: 131 MMHG | DIASTOLIC BLOOD PRESSURE: 68 MMHG

## 2023-05-05 LAB
BASE EXCESS BLDA CALC-SCNC: 3.4 MMOL/L
BASOPHILS # BLD AUTO: 0 K/UL (ref 0–0.2)
BASOPHILS NFR BLD AUTO: 0.4 % (ref 0–2)
BUN SERPL-MCNC: 27 MG/DL (ref 7–18)
CALCIUM SERPL-MCNC: 8.7 MG/DL (ref 8.5–10.1)
CHLORIDE SERPL-SCNC: 106 MMOL/L (ref 98–107)
CO2 SERPL-SCNC: 32 MMOL/L (ref 21–32)
CREAT SERPL-MCNC: 1.3 MG/DL (ref 0.6–1.3)
DO-HGB MFR BLDA: 40.8 MMHG
EOSINOPHIL NFR BLD AUTO: 3.7 % (ref 0–6)
GLUCOSE SERPL-MCNC: 118 MG/DL (ref 74–106)
HCT VFR BLD AUTO: 38 % (ref 33–45)
HGB BLD-MCNC: 11.6 G/DL (ref 11.5–14.8)
INHALED O2 CONCENTRATION: 21 %
LYMPHOCYTES NFR BLD AUTO: 1.3 K/UL (ref 0.8–4.8)
LYMPHOCYTES NFR BLD AUTO: 22.1 % (ref 20–44)
MAGNESIUM SERPL-MCNC: 2.5 MG/DL (ref 1.8–2.4)
MCHC RBC AUTO-ENTMCNC: 31 G/DL (ref 31–36)
MCV RBC AUTO: 88 FL (ref 82–100)
MONOCYTES NFR BLD AUTO: 0.4 K/UL (ref 0.1–1.3)
MONOCYTES NFR BLD AUTO: 7.3 % (ref 2–12)
NEUTROPHILS # BLD AUTO: 4 K/UL (ref 1.8–8.9)
NEUTROPHILS NFR BLD AUTO: 66.5 % (ref 43–81)
PCO2 TEMP ADJ BLDA: 41.8 MMHG (ref 35–45)
PH TEMP ADJ BLDA: 7.44 [PH] (ref 7.35–7.45)
PHOSPHATE SERPL-MCNC: 3 MG/DL (ref 2.5–4.9)
PLATELET # BLD AUTO: 228 K/UL (ref 150–450)
PO2 TEMP ADJ BLDA: 58.9 MMHG (ref 75–100)
POTASSIUM SERPL-SCNC: 3.3 MMOL/L (ref 3.5–5.1)
RBC # BLD AUTO: 4.28 MIL/UL (ref 4–5.2)
SAO2 % BLDA: 91.1 % (ref 92–98.5)
SODIUM SERPL-SCNC: 142 MMOL/L (ref 136–145)
WBC NRBC COR # BLD AUTO: 6 K/UL (ref 4.3–11)

## 2023-05-05 RX ADMIN — Medication SCH EACH: at 06:44

## 2023-05-05 RX ADMIN — TRAZODONE HYDROCHLORIDE SCH MG: 50 TABLET ORAL at 21:32

## 2023-05-05 RX ADMIN — Medication SCH MG: at 20:24

## 2023-05-05 RX ADMIN — ALBUTEROL SULFATE SCH MG: 1.25 SOLUTION RESPIRATORY (INHALATION) at 14:16

## 2023-05-05 RX ADMIN — GABAPENTIN SCH MG: 300 CAPSULE ORAL at 12:44

## 2023-05-05 RX ADMIN — INSULIN HUMAN PRN UNITS: 100 INJECTION, SOLUTION PARENTERAL at 13:13

## 2023-05-05 RX ADMIN — DILTIAZEM HYDROCHLORIDE SCH MG: 240 CAPSULE, EXTENDED RELEASE ORAL at 08:29

## 2023-05-05 RX ADMIN — APIXABAN SCH MG: 5 TABLET, FILM COATED ORAL at 08:32

## 2023-05-05 RX ADMIN — Medication PRN TAB: at 08:31

## 2023-05-05 RX ADMIN — GABAPENTIN SCH MG: 300 CAPSULE ORAL at 17:11

## 2023-05-05 RX ADMIN — Medication PRN TAB: at 20:00

## 2023-05-05 RX ADMIN — FUROSEMIDE SCH MG: 10 INJECTION, SOLUTION INTRAMUSCULAR; INTRAVENOUS at 09:12

## 2023-05-05 RX ADMIN — PANTOPRAZOLE SODIUM SCH MG: 40 TABLET, DELAYED RELEASE ORAL at 08:28

## 2023-05-05 RX ADMIN — Medication SCH EACH: at 22:14

## 2023-05-05 RX ADMIN — CALCIUM SCH MG: 500 TABLET ORAL at 08:31

## 2023-05-05 RX ADMIN — Medication SCH TAB: at 08:31

## 2023-05-05 RX ADMIN — MAGNESIUM OXIDE TAB 400 MG (241.3 MG ELEMENTAL MG) SCH MG: 400 (241.3 MG) TAB at 08:29

## 2023-05-05 RX ADMIN — HUMAN INSULIN PRN UNIT: 100 INJECTION, SOLUTION SUBCUTANEOUS at 22:13

## 2023-05-05 RX ADMIN — Medication SCH EACH: at 12:41

## 2023-05-05 RX ADMIN — Medication SCH EACH: at 17:29

## 2023-05-05 RX ADMIN — ALLOPURINOL SCH MG: 100 TABLET ORAL at 08:31

## 2023-05-05 RX ADMIN — LEVOTHYROXINE SODIUM SCH MCG: 25 TABLET ORAL at 08:28

## 2023-05-05 RX ADMIN — Medication SCH MG: at 14:16

## 2023-05-05 RX ADMIN — APIXABAN SCH MG: 5 TABLET, FILM COATED ORAL at 21:32

## 2023-05-05 RX ADMIN — INSULIN HUMAN PRN UNITS: 100 INJECTION, SOLUTION PARENTERAL at 17:58

## 2023-05-05 RX ADMIN — ATORVASTATIN CALCIUM SCH MG: 10 TABLET, FILM COATED ORAL at 21:33

## 2023-05-05 RX ADMIN — GABAPENTIN SCH MG: 300 CAPSULE ORAL at 08:31

## 2023-05-05 RX ADMIN — ALBUTEROL SULFATE SCH MG: 1.25 SOLUTION RESPIRATORY (INHALATION) at 20:24

## 2023-05-05 RX ADMIN — INSULIN HUMAN PRN UNITS: 100 INJECTION, SOLUTION PARENTERAL at 06:44

## 2023-05-05 RX ADMIN — VENLAFAXINE HYDROCHLORIDE SCH MG: 75 CAPSULE, EXTENDED RELEASE ORAL at 08:29

## 2023-05-05 NOTE — NUR
noc rn note



patient c/o 8/10, doesn't want anything strong but Norco. Given Norco 5 as ordered PRN. 
will-reassess.

## 2023-05-05 NOTE — NUR
TELE RN CLOSING NOTE



PT IN BED SLEEPING, EASILY AROUSABLE. A/O X4, ABLE TO MAKE NEEDS KNOWN. STABLE ON RA WITH NO 
SOB OR DISTRESS NOTED. DENIES CHEST PAIN. ON CARDIAC MONITOR SHOWING CONTROLLED AFIB, 90 HR. 
BILATERAL LOWER LEGS SWOLLEN, 2+ PITTING. IV ACCESS RAC #20G SL, INTACT, PATENT, FLUSHING 
WELL. VOIDED TWICE LAST NIGHT, PLACED ON [place] NO OUTPUT YET. ALL CARE PROVIDED AND MEDS 
TOLERATED WELL. SAFETY PRECAUTIONS MAINTAINED: BED LOCKED AND IN LOWEST POSITION, BED ALARM 
ON, SIDE RAILS UP X3, CALL LIGHT AND TRAY TABLE WITHIN REACH. WILL ENDORSE PIPPA TO DAY SHIFT 
NURSE.

## 2023-05-05 NOTE — NUR
CLOSING NOTE



PATIENT IS AWAKE, IN BED, A/O X4, ON ROOM AIR, NO S/S OF DISTRESS. ABLE TO MAKE NEEDS KNOWN. 
IV ACCESS RAC G#20 INTACT AND PATENT, FLUSHING WELL. SKIN ASSESSMENT: BLE SWELLING AND 
REDNESS AND SACRAL REDNESS.  MEDICATION ADMINISTERED AS SCHEDULED. PATIENT IS EXPRESSES 
ANXIETY DUE TO A FAMILY MATTER. PATIENT CONTINENT: BRP AND BSC. WITH ASSIST. FALL AND SAFETY 
PRECAUTION MAINTAINED: BED LOCKED AND AT THE LOWEST POSITION, SRX2, CALL LIGHT WITHIN REACH. 
ENDORSED TO NIGHT SHIFT NURSE.

## 2023-05-05 NOTE — NUR
OPENING NOTE



PATIENT RECEIVED AWAKE A/O X4, ON ROOM AIR, NO S/S OF DISTRESS. ABLE TO MAKE NEEDS KNOWN. IV 
ACCESS RAC G#20 INTACT AND PATENT, FLUSHING WELL. SKIN ASSESSMENT: BLE SWELLING AND REDNESS 
AND SACRAL REDNESS. PATIENT CONTINENT: BRP AND BSC. WITH ASSIST. FALL AND SAFETY PRECAUTION 
IN PLACE: BED LOCKED AND AT THE LOWEST POSITION, SRX2, CALL LIGHT WITHIN REACH.

## 2023-05-05 NOTE — NUR
noc rn opening



received patient in bed. a/ox3, forgetful-- saying that she has not received her dinner 
tray. no s/s of apparent distress on room air. c/o 8/10 pain on her abdomen, soft and 
non-tender upon palpation. reading sr on the tele monitor with 84bpm. encouraged with the 
use of call light. walker noted at bedside and a bed side commode. will manage pain and will 
continue with plan of care for patient.  

-------------------------------------------------------------------------------

Addendum: 05/05/23 at 2245 by FABRIZIO TAFOYA RN

-------------------------------------------------------------------------------

noc rn opening



received patient in bed. a/ox3, forgetful-- saying that she has not received her dinner 
tray. no s/s of apparent distress on room air. c/o 8/10 pain on her abdomen, soft and 
non-tender upon palpation. reading a-fib* on the tele monitor with 84bpm. encouraged with 
the use of call light. walker noted at bedside and a bed side commode. will manage pain and 
will continue with plan of care for patient.

## 2023-05-05 NOTE — NUR
RN NOTE



PT C/O ABDOMINAL PAIN 10/10 BUT REFUSES PAIN MEDS. ASKING TO BE REPOSITIONED AND TO SIT UP 
IN BED. HELP PROVIDED AND PT VERBALIZED EFFECTIVENESS. WILL ENDORSE TO DAY SHIFT NURSE.

## 2023-05-06 VITALS — SYSTOLIC BLOOD PRESSURE: 145 MMHG | DIASTOLIC BLOOD PRESSURE: 109 MMHG

## 2023-05-06 VITALS — SYSTOLIC BLOOD PRESSURE: 141 MMHG | DIASTOLIC BLOOD PRESSURE: 82 MMHG

## 2023-05-06 VITALS — SYSTOLIC BLOOD PRESSURE: 129 MMHG | DIASTOLIC BLOOD PRESSURE: 70 MMHG

## 2023-05-06 VITALS — SYSTOLIC BLOOD PRESSURE: 155 MMHG | DIASTOLIC BLOOD PRESSURE: 76 MMHG

## 2023-05-06 VITALS — DIASTOLIC BLOOD PRESSURE: 70 MMHG | SYSTOLIC BLOOD PRESSURE: 122 MMHG

## 2023-05-06 RX ADMIN — TRAZODONE HYDROCHLORIDE SCH MG: 50 TABLET ORAL at 21:20

## 2023-05-06 RX ADMIN — Medication PRN TAB: at 00:48

## 2023-05-06 RX ADMIN — INSULIN HUMAN PRN UNITS: 100 INJECTION, SOLUTION PARENTERAL at 11:52

## 2023-05-06 RX ADMIN — MAGNESIUM OXIDE TAB 400 MG (241.3 MG ELEMENTAL MG) SCH MG: 400 (241.3 MG) TAB at 09:55

## 2023-05-06 RX ADMIN — Medication SCH MG: at 13:42

## 2023-05-06 RX ADMIN — GABAPENTIN SCH MG: 300 CAPSULE ORAL at 09:52

## 2023-05-06 RX ADMIN — Medication SCH EACH: at 11:36

## 2023-05-06 RX ADMIN — ALBUTEROL SULFATE SCH MG: 1.25 SOLUTION RESPIRATORY (INHALATION) at 01:58

## 2023-05-06 RX ADMIN — LEVOTHYROXINE SODIUM SCH MCG: 25 TABLET ORAL at 08:03

## 2023-05-06 RX ADMIN — Medication SCH MG: at 01:30

## 2023-05-06 RX ADMIN — HUMAN INSULIN PRN UNIT: 100 INJECTION, SOLUTION SUBCUTANEOUS at 21:28

## 2023-05-06 RX ADMIN — Medication SCH TAB: at 09:52

## 2023-05-06 RX ADMIN — ATORVASTATIN CALCIUM SCH MG: 10 TABLET, FILM COATED ORAL at 21:20

## 2023-05-06 RX ADMIN — Medication SCH EACH: at 21:28

## 2023-05-06 RX ADMIN — INSULIN HUMAN PRN UNITS: 100 INJECTION, SOLUTION PARENTERAL at 17:05

## 2023-05-06 RX ADMIN — Medication SCH MG: at 08:29

## 2023-05-06 RX ADMIN — Medication PRN TAB: at 18:07

## 2023-05-06 RX ADMIN — GABAPENTIN SCH MG: 300 CAPSULE ORAL at 17:04

## 2023-05-06 RX ADMIN — FLUTICASONE FUROATE AND VILANTEROL TRIFENATATE SCH EACH: 100; 25 POWDER RESPIRATORY (INHALATION) at 11:00

## 2023-05-06 RX ADMIN — GABAPENTIN SCH MG: 300 CAPSULE ORAL at 12:38

## 2023-05-06 RX ADMIN — Medication SCH MG: at 01:58

## 2023-05-06 RX ADMIN — DEXTROSE MONOHYDRATE SCH MLS/HR: 50 INJECTION, SOLUTION INTRAVENOUS at 00:50

## 2023-05-06 RX ADMIN — CALCIUM SCH MG: 500 TABLET ORAL at 09:55

## 2023-05-06 RX ADMIN — VENLAFAXINE HYDROCHLORIDE SCH MG: 75 CAPSULE, EXTENDED RELEASE ORAL at 09:55

## 2023-05-06 RX ADMIN — DILTIAZEM HYDROCHLORIDE SCH MG: 240 CAPSULE, EXTENDED RELEASE ORAL at 09:53

## 2023-05-06 RX ADMIN — APIXABAN SCH MG: 5 TABLET, FILM COATED ORAL at 09:55

## 2023-05-06 RX ADMIN — INSULIN HUMAN PRN UNITS: 100 INJECTION, SOLUTION PARENTERAL at 06:53

## 2023-05-06 RX ADMIN — Medication SCH EACH: at 06:53

## 2023-05-06 RX ADMIN — APIXABAN SCH MG: 5 TABLET, FILM COATED ORAL at 21:19

## 2023-05-06 RX ADMIN — ALBUTEROL SULFATE SCH MG: 1.25 SOLUTION RESPIRATORY (INHALATION) at 08:29

## 2023-05-06 RX ADMIN — ALLOPURINOL SCH MG: 100 TABLET ORAL at 09:52

## 2023-05-06 RX ADMIN — Medication SCH MG: at 19:51

## 2023-05-06 RX ADMIN — PANTOPRAZOLE SODIUM SCH MG: 40 TABLET, DELAYED RELEASE ORAL at 08:03

## 2023-05-06 RX ADMIN — ALBUTEROL SULFATE SCH MG: 1.25 SOLUTION RESPIRATORY (INHALATION) at 19:51

## 2023-05-06 RX ADMIN — ALBUTEROL SULFATE SCH MG: 1.25 SOLUTION RESPIRATORY (INHALATION) at 01:30

## 2023-05-06 RX ADMIN — Medication SCH EACH: at 16:35

## 2023-05-06 RX ADMIN — ALBUTEROL SULFATE SCH MG: 1.25 SOLUTION RESPIRATORY (INHALATION) at 13:42

## 2023-05-06 RX ADMIN — FUROSEMIDE SCH MG: 10 INJECTION, SOLUTION INTRAMUSCULAR; INTRAVENOUS at 09:56

## 2023-05-06 NOTE — NUR
noc rn note



patient request for a social service consult. Per patient her daughter told her that she 
might be better off and to quote patient "in a nursing homes". Patient was emotional saying 
that the daughter is the one living with her in her own house and that the daughter doesn't 
work and is in disability. Made charge nurseAida aware of the situation. Social Service 
consult ordered as patient requested. Emotional support given at this time to patient.

## 2023-05-06 NOTE — NUR
RN NOTE



PHARMACY CALLED TO CLARIFY IF PATIENT SHOULD BE IN THE MEDICATION MEDROL. PER PHARMACY 
PATIENT WENT HOME WITH MEDROL FOR 6 DAYS ON HER PREVIOUS ADMISSION AND MEDICATION SHOULD BE 
DC. DR MORROW WAS INFORMED WHILE AT BEDSIDE AND DR STATED PATIENT SHOULD BE ON IT BUT HE 
WILL LOOK INTO.

## 2023-05-06 NOTE — NUR
noc rn note



Patient refused blood draw this am. patient education done. Per patient she just wants to 
sleep.

## 2023-05-06 NOTE — NUR
noc rn closing



Patient sitting in bed c/o of intermittent sleep. no s/s of apparent distress on room air. 
pain managed with medications. reading a-fib throughout shift. All needs attended. all 
scheduled medications administered. will endorse to morning shift rn for continuity of 
patient care.

## 2023-05-06 NOTE — NUR
noc rn opening



Received patient sitting in chair. a/ox4. no s/s of apparent distress on room air. Pain 
subsided to 7 at this time, being chronic. Patient has no fluids running. reading A-fib on 
the tele monitor. Encouraged with the use of call light. will continue with patient's plan 
of care.

## 2023-05-06 NOTE — NUR
TELE RN OPENING NOTE



RECEIVED PATIENT  AWAKE IN BED A/O X4. ON ROOM AIR, NO S/S OF DISTRESS AND NO SOB NOTED. 
ABLE TO MAKE NEEDS KNOWN. IV ACCESS RAC G#20 SL, INTACT AND PATENT, FLUSHING WELL. WITH 
EXTERNAL CARDIAC MONITOR ON AFIB WITH . PATIENT HAS BLE SWELLING AND REDNESS AND 
SACRAL REDNESS. PATIENT IS CONTINENT, AMBULATORY  WITH ASSISTANCE. FALL AND SAFETY 
PRECAUTION IN PLACE: BED LOCKED AND AT THE LOWEST POSITION, SIDE RAILS UP X2, CALL LIGHT 
WITHIN REACH.   WILL CONTINUE TO MONITOR FOR PIPPA I have reviewed and confirmed nurses' notes for patient's medications, allergies, medical history, and surgical history.

## 2023-05-06 NOTE — NUR
TELE RN CLOSING NOTE



PATIENT  AWAKE IN BED A/O X3-4. ON ROOM AIR, NO S/S OF DISTRESS AND NO SOB NOTED. ABLE TO 
MAKE NEEDS KNOWN. IV AT RAC #20G SL INTACT, PATENT AND FLUSHING WELL. WITH EXTERNAL CARDIAC 
MONITOR WITH CURRENT READING OF CONTROLLED A-FIB WITH . SKIN ASSESSMENT DONE, WITH 
CELLULITIS OF BLE, SKIN CARE IMPLEMENTED. SCHEDULED MEDICATION ADMINISTERED. FALL AND SAFETY 
PRECAUTION IN PLACE: BED LOCKED AND AT THE LOWEST POSITION, SIDE RAILS UP X2, CALL LIGHT 
WITHIN REACH.   WILL ENDORSE TO NIGHT SHIFT NURSE.

## 2023-05-06 NOTE — NUR
noc rn note



Patient given Tylenol per patient request. c/o Hip pain that radiates to her lumbar area. 
Snacks provided. Will re-assess.

## 2023-05-07 VITALS — DIASTOLIC BLOOD PRESSURE: 85 MMHG | SYSTOLIC BLOOD PRESSURE: 126 MMHG

## 2023-05-07 VITALS — SYSTOLIC BLOOD PRESSURE: 142 MMHG | DIASTOLIC BLOOD PRESSURE: 92 MMHG

## 2023-05-07 VITALS — DIASTOLIC BLOOD PRESSURE: 99 MMHG | SYSTOLIC BLOOD PRESSURE: 145 MMHG

## 2023-05-07 VITALS — SYSTOLIC BLOOD PRESSURE: 154 MMHG | DIASTOLIC BLOOD PRESSURE: 89 MMHG

## 2023-05-07 VITALS — SYSTOLIC BLOOD PRESSURE: 152 MMHG | DIASTOLIC BLOOD PRESSURE: 98 MMHG

## 2023-05-07 VITALS — SYSTOLIC BLOOD PRESSURE: 118 MMHG | DIASTOLIC BLOOD PRESSURE: 75 MMHG

## 2023-05-07 LAB
BUN SERPL-MCNC: 21 MG/DL (ref 7–18)
CALCIUM SERPL-MCNC: 9.3 MG/DL (ref 8.5–10.1)
CHLORIDE SERPL-SCNC: 105 MMOL/L (ref 98–107)
CO2 SERPL-SCNC: 31 MMOL/L (ref 21–32)
CREAT SERPL-MCNC: 1.3 MG/DL (ref 0.6–1.3)
GLUCOSE SERPL-MCNC: 112 MG/DL (ref 74–106)
POTASSIUM SERPL-SCNC: 3.9 MMOL/L (ref 3.5–5.1)
SODIUM SERPL-SCNC: 142 MMOL/L (ref 136–145)

## 2023-05-07 RX ADMIN — GABAPENTIN SCH MG: 300 CAPSULE ORAL at 16:52

## 2023-05-07 RX ADMIN — GABAPENTIN SCH MG: 300 CAPSULE ORAL at 12:17

## 2023-05-07 RX ADMIN — Medication PRN TAB: at 20:53

## 2023-05-07 RX ADMIN — HUMAN INSULIN PRN UNIT: 100 INJECTION, SOLUTION SUBCUTANEOUS at 22:25

## 2023-05-07 RX ADMIN — Medication SCH TAB: at 08:20

## 2023-05-07 RX ADMIN — Medication PRN TAB: at 09:47

## 2023-05-07 RX ADMIN — ATORVASTATIN CALCIUM SCH MG: 10 TABLET, FILM COATED ORAL at 22:26

## 2023-05-07 RX ADMIN — Medication SCH EACH: at 22:25

## 2023-05-07 RX ADMIN — DEXTROSE MONOHYDRATE SCH MLS/HR: 50 INJECTION, SOLUTION INTRAVENOUS at 00:53

## 2023-05-07 RX ADMIN — Medication SCH EACH: at 06:23

## 2023-05-07 RX ADMIN — MAGNESIUM OXIDE TAB 400 MG (241.3 MG ELEMENTAL MG) SCH MG: 400 (241.3 MG) TAB at 08:19

## 2023-05-07 RX ADMIN — TRAZODONE HYDROCHLORIDE SCH MG: 50 TABLET ORAL at 22:26

## 2023-05-07 RX ADMIN — INSULIN HUMAN PRN UNITS: 100 INJECTION, SOLUTION PARENTERAL at 06:23

## 2023-05-07 RX ADMIN — DILTIAZEM HYDROCHLORIDE SCH MG: 240 CAPSULE, EXTENDED RELEASE ORAL at 08:19

## 2023-05-07 RX ADMIN — LEVOTHYROXINE SODIUM SCH MCG: 25 TABLET ORAL at 08:17

## 2023-05-07 RX ADMIN — VENLAFAXINE HYDROCHLORIDE SCH MG: 75 CAPSULE, EXTENDED RELEASE ORAL at 08:19

## 2023-05-07 RX ADMIN — ALBUTEROL SULFATE SCH MG: 1.25 SOLUTION RESPIRATORY (INHALATION) at 01:19

## 2023-05-07 RX ADMIN — ALBUTEROL SULFATE SCH MG: 1.25 SOLUTION RESPIRATORY (INHALATION) at 12:52

## 2023-05-07 RX ADMIN — Medication SCH EACH: at 16:53

## 2023-05-07 RX ADMIN — ALLOPURINOL SCH MG: 100 TABLET ORAL at 08:20

## 2023-05-07 RX ADMIN — ALBUTEROL SULFATE SCH MG: 1.25 SOLUTION RESPIRATORY (INHALATION) at 20:10

## 2023-05-07 RX ADMIN — Medication SCH EACH: at 12:10

## 2023-05-07 RX ADMIN — Medication SCH MG: at 01:18

## 2023-05-07 RX ADMIN — FUROSEMIDE SCH MG: 10 INJECTION, SOLUTION INTRAMUSCULAR; INTRAVENOUS at 08:17

## 2023-05-07 RX ADMIN — INSULIN HUMAN PRN UNITS: 100 INJECTION, SOLUTION PARENTERAL at 12:17

## 2023-05-07 RX ADMIN — PANTOPRAZOLE SODIUM SCH MG: 40 TABLET, DELAYED RELEASE ORAL at 08:16

## 2023-05-07 RX ADMIN — APIXABAN SCH MG: 5 TABLET, FILM COATED ORAL at 08:22

## 2023-05-07 RX ADMIN — Medication SCH MG: at 20:10

## 2023-05-07 RX ADMIN — APIXABAN SCH MG: 5 TABLET, FILM COATED ORAL at 20:54

## 2023-05-07 RX ADMIN — ALBUTEROL SULFATE SCH MG: 1.25 SOLUTION RESPIRATORY (INHALATION) at 07:52

## 2023-05-07 RX ADMIN — CALCIUM SCH MG: 500 TABLET ORAL at 08:20

## 2023-05-07 RX ADMIN — GABAPENTIN SCH MG: 300 CAPSULE ORAL at 08:20

## 2023-05-07 RX ADMIN — Medication SCH MG: at 07:52

## 2023-05-07 RX ADMIN — Medication SCH MG: at 12:52

## 2023-05-07 RX ADMIN — FLUTICASONE FUROATE AND VILANTEROL TRIFENATATE SCH EACH: 100; 25 POWDER RESPIRATORY (INHALATION) at 08:23

## 2023-05-07 NOTE — NUR
MS RN OPENING NOTE



RECEIVED PATIENT AWAKE IN BED, WATCHING TV AT THIS TIME. A/O X4, ABLE TO MAKE NEEDS KNOWN. 
ON RA WITH NO S/S OF DISTRESS AND SOB NOTED. DENIES PAIN AT THIS TIME. IV ACCESS RAC G#20 
SL, INTACT AND PATENT, FLUSHING WELL. REDNESS ON BILATERAL LEGS, SWELLING ON L LEG, NO 
WEEPING NOTED. PATIENT CONTINENT, AMBULATORY WITH WALKER. FALL AND SAFETY PRECAUTION IN 
PLACE: BED LOCKED AND AT THE LOWEST POSITION, SIDE RAILS UP X2, CALL LIGHT WITHIN REACH. 
WILL CONTINUE TO MONITOR AND ASSIST.

## 2023-05-07 NOTE — NUR
ECHO with normal LV function. No wall motion abnormalities. noc rn closing



Patient sitting in bed c/o of intermittent sleep. no s/s of apparent distress-- started on 
o2 2lpm via nc for comfort this am, patient was c/o SOB. reading a-fib throughout shift. All 
needs attended. all scheduled medications administered. will endorse to morning shift rn for 
continuity of patient care.

## 2023-05-07 NOTE — NUR
CHANGE OF SHIFT REPORT



PATIENT RESTING COMFORTABLY IN CHAIR. NO S/S OR C/O PAIN OR DISTRESS NOTED. SIDE RAILS UP 
X2, CALL LIGHT LEFT WITHIN REACH. PT KEPT CLEAN, DRY, AND COMFORTABLE. NO SIGNIFICANT 
CHANGSE SINCE PREVIOUS SHIFT. WILL GIVE REPORT TO NOC. RN.

## 2023-05-07 NOTE — NUR
PATIENT RECEIVED



RESTING COMFORTABLY IN BED. NO S/S OR C/O PAIN OR DISTRESS NOTED. SIDE RAILS UP X2, CALL 
LIGHT LEFT WITHIN REACH. WILL CONTINUE PLAN OF CARE.

## 2023-05-08 VITALS — DIASTOLIC BLOOD PRESSURE: 79 MMHG | SYSTOLIC BLOOD PRESSURE: 157 MMHG

## 2023-05-08 VITALS — SYSTOLIC BLOOD PRESSURE: 157 MMHG | DIASTOLIC BLOOD PRESSURE: 79 MMHG

## 2023-05-08 LAB
ALBUMIN SERPL BCP-MCNC: 3.2 G/DL (ref 3.4–5)
ALP SERPL-CCNC: 78 U/L (ref 46–116)
ALT SERPL W P-5'-P-CCNC: 22 U/L (ref 12–78)
AST SERPL W P-5'-P-CCNC: 19 U/L (ref 15–37)
BILIRUB SERPL-MCNC: 0.3 MG/DL (ref 0.2–1)
BUN SERPL-MCNC: 19 MG/DL (ref 7–18)
BUN SERPL-MCNC: 23 MG/DL (ref 7–18)
CALCIUM SERPL-MCNC: 9.3 MG/DL (ref 8.5–10.1)
CALCIUM SERPL-MCNC: 9.5 MG/DL (ref 8.5–10.1)
CHLORIDE SERPL-SCNC: 103 MMOL/L (ref 98–107)
CHLORIDE SERPL-SCNC: 105 MMOL/L (ref 98–107)
CO2 SERPL-SCNC: 25 MMOL/L (ref 21–32)
CO2 SERPL-SCNC: 27 MMOL/L (ref 21–32)
CREAT SERPL-MCNC: 1.3 MG/DL (ref 0.6–1.3)
CREAT SERPL-MCNC: 1.4 MG/DL (ref 0.6–1.3)
GLUCOSE SERPL-MCNC: 109 MG/DL (ref 74–106)
GLUCOSE SERPL-MCNC: 129 MG/DL (ref 74–106)
POTASSIUM SERPL-SCNC: 3.9 MMOL/L (ref 3.5–5.1)
POTASSIUM SERPL-SCNC: 4.3 MMOL/L (ref 3.5–5.1)
PROT SERPL-MCNC: 7.1 G/DL (ref 6.4–8.2)
SODIUM SERPL-SCNC: 140 MMOL/L (ref 136–145)
SODIUM SERPL-SCNC: 142 MMOL/L (ref 136–145)

## 2023-05-08 RX ADMIN — VENLAFAXINE HYDROCHLORIDE SCH MG: 75 CAPSULE, EXTENDED RELEASE ORAL at 08:26

## 2023-05-08 RX ADMIN — PANTOPRAZOLE SODIUM SCH MG: 40 TABLET, DELAYED RELEASE ORAL at 08:30

## 2023-05-08 RX ADMIN — ALBUTEROL SULFATE SCH MG: 1.25 SOLUTION RESPIRATORY (INHALATION) at 12:38

## 2023-05-08 RX ADMIN — ALBUTEROL SULFATE SCH MG: 1.25 SOLUTION RESPIRATORY (INHALATION) at 08:07

## 2023-05-08 RX ADMIN — Medication SCH MG: at 08:07

## 2023-05-08 RX ADMIN — Medication SCH MG: at 00:55

## 2023-05-08 RX ADMIN — CALCIUM SCH MG: 500 TABLET ORAL at 08:31

## 2023-05-08 RX ADMIN — Medication SCH TAB: at 08:26

## 2023-05-08 RX ADMIN — ALBUTEROL SULFATE SCH MG: 1.25 SOLUTION RESPIRATORY (INHALATION) at 00:55

## 2023-05-08 RX ADMIN — MAGNESIUM OXIDE TAB 400 MG (241.3 MG ELEMENTAL MG) SCH MG: 400 (241.3 MG) TAB at 08:30

## 2023-05-08 RX ADMIN — LEVOTHYROXINE SODIUM SCH MCG: 25 TABLET ORAL at 08:26

## 2023-05-08 RX ADMIN — GABAPENTIN SCH MG: 300 CAPSULE ORAL at 08:30

## 2023-05-08 RX ADMIN — DILTIAZEM HYDROCHLORIDE SCH MG: 240 CAPSULE, EXTENDED RELEASE ORAL at 08:30

## 2023-05-08 RX ADMIN — FLUTICASONE FUROATE AND VILANTEROL TRIFENATATE SCH EACH: 100; 25 POWDER RESPIRATORY (INHALATION) at 08:33

## 2023-05-08 RX ADMIN — ALLOPURINOL SCH MG: 100 TABLET ORAL at 08:26

## 2023-05-08 RX ADMIN — Medication SCH EACH: at 08:10

## 2023-05-08 RX ADMIN — Medication PRN TAB: at 05:55

## 2023-05-08 RX ADMIN — Medication SCH MG: at 12:38

## 2023-05-08 RX ADMIN — DEXTROSE MONOHYDRATE SCH MLS/HR: 50 INJECTION, SOLUTION INTRAVENOUS at 01:15

## 2023-05-08 RX ADMIN — APIXABAN SCH MG: 5 TABLET, FILM COATED ORAL at 08:33

## 2023-05-08 NOTE — NUR
MS RN NOTE



PATIENT DISCHARGED AT AROUND 1310 AS ORDERED, IN STABLE CONDITION. IV ACCESS REMOVED AND 
COVERED WITH DRY DRESSING. TOLERATED WELL. MEDICATION INSTRUCTIONS EXPLAINED TO THE PATIENT 
AND PATIENT'S DAUGHTER MIRZA MONTERO). VERBALIZED UNDERSTANDING AND APPRECIATION. HOME HEALTH 
C/O . PATIENT ID REMOVED AND WHEELED TO LOBBY WITH DAUGHTER PICKING UP THE 
PATIENT. ENDORSED ACCORDINGLY.

## 2023-05-08 NOTE — NUR
MS RN CLOSING NOTE



PATIENT AWAKE IN BED, WATCHING TV AT THIS TIME. A/O X4, ABLE TO MAKE NEEDS KNOWN. STABLE ON 
O2 2L VIA NC (SOMETIMES RA) WITH NO S/S OF DISTRESS AND SOB NOTED. DENIES PAIN AT THIS TIME. 
IV ACCESS RAC G#20 SL, INTACT AND PATENT, FLUSHING WELL. REDNESS ON BILATERAL LEGS, SWELLING 
ON L LEG, NO WEEPING NOTED. PATIENT CONTINENT, AMBULATORY WITH WALKER. ALL CARE PROVIDED AND 
MEDS TOLERATED WELL. FALL AND SAFETY PRECAUTION IN PLACE: BED LOCKED AND AT THE LOWEST 
POSITION, SIDE RAILS UP X2, CALL LIGHT WITHIN REACH. WILL ENDORSE PIPPA TO DAY SHIFT NURSE.

## 2023-05-08 NOTE — NUR
MS RN OPENING NOTE



RECEIVED PATIENT AWAKE IN BED, A/O X4, ABLE TO MAKE NEEDS KNOWN. ON RA WITH NO S/S OF 
DISTRESS AND SOB NOTED. DENIES PAIN AT THIS TIME. IV ACCESS RAC G#20 SL, INTACT AND PATENT, 
FLUSHING WELL. REDNESS ON BILATERAL LEGS, NO COMPLAIN OF PAIN OR DISCOMFORT AT THIS TIME. 
PATIENT AMBULATES WITH WALKER. FALL AND SAFETY PRECAUTION IN PLACE: BED LOCKED AND AT THE 
LOWEST POSITION, SIDE RAILS UP X2, CALL LIGHT WITHIN REACH. WILL CONTINUE WITH PLAN OF CARE.

## 2023-05-08 NOTE — NUR
MS RN NOTE



SEEN BY DR. FROST WITH ORDER FOR DISCHARGE. HEALTH TEACHING DONE REGARDING DISCHARGE AND 
DISCHARGE INSTRUCTIONS. VERBALIZED UNDERSTANDING AND APPRECIATION. IN STABLE CONDITION. 
AWAITING FINAL ORDERS

## 2023-07-24 ENCOUNTER — HOSPITAL ENCOUNTER (INPATIENT)
Dept: HOSPITAL 54 - ER | Age: 78
LOS: 8 days | Discharge: HOME | DRG: 885 | End: 2023-08-01
Attending: INTERNAL MEDICINE | Admitting: PSYCHIATRY & NEUROLOGY
Payer: MEDICARE

## 2023-07-24 VITALS — BODY MASS INDEX: 40.97 KG/M2 | HEIGHT: 64 IN | WEIGHT: 240 LBS

## 2023-07-24 VITALS — TEMPERATURE: 98.4 F | OXYGEN SATURATION: 95 % | DIASTOLIC BLOOD PRESSURE: 94 MMHG | SYSTOLIC BLOOD PRESSURE: 126 MMHG

## 2023-07-24 VITALS — TEMPERATURE: 98.4 F

## 2023-07-24 DIAGNOSIS — L97.819: ICD-10-CM

## 2023-07-24 DIAGNOSIS — E03.9: ICD-10-CM

## 2023-07-24 DIAGNOSIS — E78.5: ICD-10-CM

## 2023-07-24 DIAGNOSIS — E66.01: ICD-10-CM

## 2023-07-24 DIAGNOSIS — E83.19: ICD-10-CM

## 2023-07-24 DIAGNOSIS — I25.10: ICD-10-CM

## 2023-07-24 DIAGNOSIS — F03.94: ICD-10-CM

## 2023-07-24 DIAGNOSIS — Z90.710: ICD-10-CM

## 2023-07-24 DIAGNOSIS — M79.675: ICD-10-CM

## 2023-07-24 DIAGNOSIS — M81.0: ICD-10-CM

## 2023-07-24 DIAGNOSIS — G89.4: ICD-10-CM

## 2023-07-24 DIAGNOSIS — D68.69: ICD-10-CM

## 2023-07-24 DIAGNOSIS — M62.81: ICD-10-CM

## 2023-07-24 DIAGNOSIS — J44.9: ICD-10-CM

## 2023-07-24 DIAGNOSIS — Z79.899: ICD-10-CM

## 2023-07-24 DIAGNOSIS — I50.32: ICD-10-CM

## 2023-07-24 DIAGNOSIS — Z79.01: ICD-10-CM

## 2023-07-24 DIAGNOSIS — R45.851: ICD-10-CM

## 2023-07-24 DIAGNOSIS — E11.22: ICD-10-CM

## 2023-07-24 DIAGNOSIS — Z90.49: ICD-10-CM

## 2023-07-24 DIAGNOSIS — F33.2: Primary | ICD-10-CM

## 2023-07-24 DIAGNOSIS — L60.3: ICD-10-CM

## 2023-07-24 DIAGNOSIS — F41.1: ICD-10-CM

## 2023-07-24 DIAGNOSIS — M79.7: ICD-10-CM

## 2023-07-24 DIAGNOSIS — Z79.891: ICD-10-CM

## 2023-07-24 DIAGNOSIS — F03.93: ICD-10-CM

## 2023-07-24 DIAGNOSIS — I48.20: ICD-10-CM

## 2023-07-24 DIAGNOSIS — I87.313: ICD-10-CM

## 2023-07-24 DIAGNOSIS — I13.0: ICD-10-CM

## 2023-07-24 DIAGNOSIS — I27.20: ICD-10-CM

## 2023-07-24 DIAGNOSIS — Z79.84: ICD-10-CM

## 2023-07-24 DIAGNOSIS — N17.0: ICD-10-CM

## 2023-07-24 DIAGNOSIS — N18.9: ICD-10-CM

## 2023-07-24 DIAGNOSIS — L97.829: ICD-10-CM

## 2023-07-24 DIAGNOSIS — I89.0: ICD-10-CM

## 2023-07-24 LAB
ALBUMIN SERPL BCP-MCNC: 2.9 G/DL (ref 3.4–5)
ALP SERPL-CCNC: 69 U/L (ref 46–116)
ALT SERPL W P-5'-P-CCNC: 27 U/L (ref 12–78)
AST SERPL W P-5'-P-CCNC: 20 U/L (ref 15–37)
BASOPHILS # BLD AUTO: 0 K/UL (ref 0–0.2)
BASOPHILS NFR BLD AUTO: 0.3 % (ref 0–2)
BILIRUB DIRECT SERPL-MCNC: 0.1 MG/DL (ref 0–0.2)
BILIRUB SERPL-MCNC: 0.3 MG/DL (ref 0.2–1)
BILIRUB UR QL STRIP: NEGATIVE
BUN SERPL-MCNC: 22 MG/DL (ref 7–18)
CALCIUM SERPL-MCNC: 9.3 MG/DL (ref 8.5–10.1)
CHLORIDE SERPL-SCNC: 103 MMOL/L (ref 98–107)
CO2 SERPL-SCNC: 27 MMOL/L (ref 21–32)
COLOR UR: YELLOW
CREAT SERPL-MCNC: 1.4 MG/DL (ref 0.6–1.3)
EOSINOPHIL NFR BLD AUTO: 2.3 % (ref 0–6)
ETHANOL SERPL-MCNC: < 3 MG/DL (ref 0–10)
GLUCOSE SERPL-MCNC: 144 MG/DL (ref 74–106)
GLUCOSE UR STRIP-MCNC: (no result) MG/DL
HCT VFR BLD AUTO: 39 % (ref 33–45)
HGB BLD-MCNC: 12.2 G/DL (ref 11.5–14.8)
LEUKOCYTE ESTERASE UR QL STRIP: NEGATIVE
LYMPHOCYTES NFR BLD AUTO: 1.1 K/UL (ref 0.8–4.8)
LYMPHOCYTES NFR BLD AUTO: 14.2 % (ref 20–44)
MCHC RBC AUTO-ENTMCNC: 31 G/DL (ref 31–36)
MCV RBC AUTO: 91 FL (ref 82–100)
MONOCYTES NFR BLD AUTO: 0.7 K/UL (ref 0.1–1.3)
MONOCYTES NFR BLD AUTO: 8.7 % (ref 2–12)
NEUTROPHILS # BLD AUTO: 5.7 K/UL (ref 1.8–8.9)
NEUTROPHILS NFR BLD AUTO: 74.5 % (ref 43–81)
NITRITE UR QL STRIP: NEGATIVE
PH UR STRIP: 6 [PH] (ref 5–8)
PLATELET # BLD AUTO: 340 K/UL (ref 150–450)
POTASSIUM SERPL-SCNC: 3.8 MMOL/L (ref 3.5–5.1)
PROT SERPL-MCNC: 7.1 G/DL (ref 6.4–8.2)
PROT UR QL STRIP: NEGATIVE MG/DL
RBC # BLD AUTO: 4.31 MIL/UL (ref 4–5.2)
SODIUM SERPL-SCNC: 141 MMOL/L (ref 136–145)
UROBILINOGEN UR STRIP-MCNC: 0.2 EU/DL
WBC NRBC COR # BLD AUTO: 7.7 K/UL (ref 4.3–11)

## 2023-07-24 PROCEDURE — G0480 DRUG TEST DEF 1-7 CLASSES: HCPCS

## 2023-07-24 RX ADMIN — ZOLPIDEM TARTRATE PRN MG: 5 TABLET, FILM COATED ORAL at 21:44

## 2023-07-24 NOTE — NUR
RN ADMISSION NOTE:



ADMITTED A 76Y/O, FEMALE, FROM -[facility], formerly [facility]. ADMITTED ON A 5150 HOLD FOR GD. PER HOLD, PT. 
ADMITTED DUE TO WANTS TO TAKE ALL HER MEDICATIONS ,GD. UPON FACE TO FACE EVALUATION, PATIENT 
IS ALERT AND ORIENTED X2 ANXIOUS, AGGRESSIVE, DISORGANIZED.SKIN ASSESSMENT DONE,HER LOWER 
AND UPPER EXTREMITIES BRUISE AND WOUND, AND LEFT BIG TOE HAS OPEN WOUND. PATIENT'S RIGHTS 
WERE DISCUSSED AND BOOKLET WAS GIVEN. CONTACTED DR. MALAVE  OF THE ADMISSION. INFORMED HER 
DAUGHTER .BED IN LOW AND LOCKED POSITION. VITAL SIGNS REFUSED. PT UNABLE TO SIGN ADMITTING 
DOCUMENTS D/T AGGRESSIVE BEHAVIOR. SAFETY PRECAUTIONS MAINTAINED. WILL CONTINUE TO MONITOR 
Q15 MINS FOR MOOD, SAFETY AND BEHAVIOR.

## 2023-07-24 NOTE — NUR
RN NOTE:- 

PATIENT UNABLE TO SLEEP PRN AMBIEN 5MG PO GIVEN PER PATIENT REQUEST , WILL CONTINUE TO 
MONITOR.

## 2023-07-25 VITALS — DIASTOLIC BLOOD PRESSURE: 65 MMHG | TEMPERATURE: 98.7 F | SYSTOLIC BLOOD PRESSURE: 103 MMHG | OXYGEN SATURATION: 97 %

## 2023-07-25 VITALS — TEMPERATURE: 97.5 F | DIASTOLIC BLOOD PRESSURE: 65 MMHG | SYSTOLIC BLOOD PRESSURE: 123 MMHG | OXYGEN SATURATION: 98 %

## 2023-07-25 LAB
BUN SERPL-MCNC: 21 MG/DL (ref 7–18)
CALCIUM SERPL-MCNC: 9.3 MG/DL (ref 8.5–10.1)
CHLORIDE SERPL-SCNC: 103 MMOL/L (ref 98–107)
CHOLEST SERPL-MCNC: 133 MG/DL (ref ?–200)
CO2 SERPL-SCNC: 25 MMOL/L (ref 21–32)
CREAT SERPL-MCNC: 1.4 MG/DL (ref 0.6–1.3)
CREAT SERPL-MCNC: 1.5 MG/DL (ref 0.6–1.3)
GLUCOSE SERPL-MCNC: 115 MG/DL (ref 74–106)
HDLC SERPL-MCNC: 53 MG/DL (ref 40–60)
LDLC SERPL DIRECT ASSAY-MCNC: 59 MG/DL (ref 0–99)
POTASSIUM SERPL-SCNC: 3.7 MMOL/L (ref 3.5–5.1)
SODIUM SERPL-SCNC: 139 MMOL/L (ref 136–145)
TRIGL SERPL-MCNC: 105 MG/DL (ref 30–150)

## 2023-07-25 RX ADMIN — CHLORDIAZEPOXIDE HYDROCHLORIDE PRN MG: 25 CAPSULE ORAL at 20:55

## 2023-07-25 RX ADMIN — ACETAMINOPHEN PRN MG: 325 TABLET ORAL at 06:59

## 2023-07-25 RX ADMIN — GABAPENTIN SCH MG: 300 CAPSULE ORAL at 16:47

## 2023-07-25 RX ADMIN — ZOLPIDEM TARTRATE PRN MG: 5 TABLET, FILM COATED ORAL at 22:09

## 2023-07-25 RX ADMIN — LORAZEPAM PRN MG: 0.5 TABLET ORAL at 09:49

## 2023-07-25 RX ADMIN — LORAZEPAM PRN MG: 0.5 TABLET ORAL at 02:49

## 2023-07-25 RX ADMIN — GABAPENTIN SCH MG: 300 CAPSULE ORAL at 12:37

## 2023-07-25 RX ADMIN — Medication SCH OZ: at 09:46

## 2023-07-25 RX ADMIN — ACETAMINOPHEN PRN MG: 325 TABLET ORAL at 20:13

## 2023-07-25 RX ADMIN — CHLORDIAZEPOXIDE HYDROCHLORIDE PRN MG: 25 CAPSULE ORAL at 12:37
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
## 2023-07-25 NOTE — NUR
WOUND CARE CONSULT: PT KNOWN TO WOUND CARE FROM PREVIOUS ADMISSIONS. PT YELLING AND AGITATED 
THIS AM. REVIEWED CHART, NURSING DOCUMENTATION AND PHOTOS WHICH INDICATE LEFT GREAT TOE 
WOUND, REDNESS WITH WOUNDS TO LOWER LEGS, PRESENT ON ADMISSION. DR LAZO CALLED FOR DPM 
CONSULT. DISCUSSED SKIN PROTECTION WITH NURSING STAFF. MD IN AGREEMENT WITH PLAN OF CARE.

## 2023-07-25 NOTE — NUR
EWELINA Initial Discharge Note:

Patient currently resides at home located [address] [place] [address]; ([phone]). Pt would want to return back home. EWELINA will contact pt's 
daughter Briseida ([phone]) and will discuss treatment and discharge plan. EWELINA will work 
with the MD, family, and treatment team.

## 2023-07-25 NOTE — NUR
RN NOTE:-

 PT ANXIOUS AND RESTLESS AT THIS TIME. ATIVAN PO ADMINISTERED AS ORDERED.WILL CONTINUE TO 
MONITOR.
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
## 2023-07-26 VITALS — SYSTOLIC BLOOD PRESSURE: 135 MMHG | DIASTOLIC BLOOD PRESSURE: 82 MMHG | TEMPERATURE: 98.1 F | OXYGEN SATURATION: 97 %

## 2023-07-26 VITALS — OXYGEN SATURATION: 96 % | TEMPERATURE: 98.7 F | DIASTOLIC BLOOD PRESSURE: 91 MMHG | SYSTOLIC BLOOD PRESSURE: 130 MMHG

## 2023-07-26 LAB
BUN SERPL-MCNC: 16 MG/DL (ref 7–18)
CALCIUM SERPL-MCNC: 8.9 MG/DL (ref 8.5–10.1)
CHLORIDE SERPL-SCNC: 107 MMOL/L (ref 98–107)
CO2 SERPL-SCNC: 27 MMOL/L (ref 21–32)
CREAT SERPL-MCNC: 1.3 MG/DL (ref 0.6–1.3)
GLUCOSE SERPL-MCNC: 121 MG/DL (ref 74–106)
POTASSIUM SERPL-SCNC: 4 MMOL/L (ref 3.5–5.1)
SODIUM SERPL-SCNC: 144 MMOL/L (ref 136–145)

## 2023-07-26 RX ADMIN — GABAPENTIN SCH MG: 300 CAPSULE ORAL at 16:46

## 2023-07-26 RX ADMIN — ACETAMINOPHEN PRN MG: 325 TABLET ORAL at 08:27

## 2023-07-26 RX ADMIN — Medication PRN TAB: at 10:05

## 2023-07-26 RX ADMIN — GABAPENTIN SCH MG: 300 CAPSULE ORAL at 12:13

## 2023-07-26 RX ADMIN — ARIPIPRAZOLE SCH MG: 5 TABLET ORAL at 08:27

## 2023-07-26 RX ADMIN — Medication SCH OZ: at 08:31

## 2023-07-26 RX ADMIN — GABAPENTIN SCH MG: 300 CAPSULE ORAL at 08:28

## 2023-07-26 RX ADMIN — Medication PRN TAB: at 18:57

## 2023-07-26 RX ADMIN — VENLAFAXINE HYDROCHLORIDE SCH MG: 150 CAPSULE, EXTENDED RELEASE ORAL at 08:27

## 2023-07-26 RX ADMIN — CHLORDIAZEPOXIDE HYDROCHLORIDE PRN MG: 25 CAPSULE ORAL at 05:29

## 2023-07-26 NOTE — NUR
GPS RN NOTE:



RECEIVED PT IN BED. AAOX3. ABLE TO MAKE NEEDS KNOWN. CURRENT BEHAVIOR ANXIOUS, CALM, 
COOPERATIVE WITH CARE, GUARDED, MED COMPLIANT WITH WHOLE PILL, NEEDY AND LOUD. NO RESP 
DISTRESS NOTED. AFEBRILE. ON VISUAL MONITORING [id number] FOR SAFETY AND NEEDS. BEDS KEPT LOW 
AND LOCK FOR SAFETY.

## 2023-07-26 NOTE — NUR
GPS RN NOTES



PATIENT C/O LOWER BACK PAIN ON A PAIN SCALE OF 9/10. TYLENOL WAS GIVEN BUT INEFFECTIVE. EPIC 
ON-CALL NP LUIS NOTIFIED OF THE FINDING ASSESSMENT AND OBTAINED ORDER OF NORCO 5/325MG PO 
X1 DOSE NOW NOTED AND CARRIED OUT.

## 2023-07-26 NOTE — NUR
GPS RN NOTE:



PT IS AAOX4. ABLE TO MAKE NEEDS KNOWN. ABLE TO MAKE DECISIONS. PER PT SHES REQUESTING FOR 
REGULAR DIET. DISCUSS RISK AND BENEFITS. CONT TO INSIST FOR REGULAR DIET. MD MADE AWARE WITH 
REGULAR DIET ORDER.

## 2023-07-27 VITALS — OXYGEN SATURATION: 98 % | TEMPERATURE: 97.8 F | DIASTOLIC BLOOD PRESSURE: 97 MMHG | SYSTOLIC BLOOD PRESSURE: 153 MMHG

## 2023-07-27 VITALS — SYSTOLIC BLOOD PRESSURE: 154 MMHG | DIASTOLIC BLOOD PRESSURE: 96 MMHG | TEMPERATURE: 97.8 F | OXYGEN SATURATION: 95 %

## 2023-07-27 VITALS — SYSTOLIC BLOOD PRESSURE: 138 MMHG | DIASTOLIC BLOOD PRESSURE: 84 MMHG | OXYGEN SATURATION: 98 % | TEMPERATURE: 98 F

## 2023-07-27 RX ADMIN — Medication PRN TAB: at 00:46

## 2023-07-27 RX ADMIN — VENLAFAXINE HYDROCHLORIDE SCH MG: 150 CAPSULE, EXTENDED RELEASE ORAL at 08:21

## 2023-07-27 RX ADMIN — GABAPENTIN SCH MG: 300 CAPSULE ORAL at 12:13

## 2023-07-27 RX ADMIN — GABAPENTIN SCH MG: 300 CAPSULE ORAL at 20:25

## 2023-07-27 RX ADMIN — ZOLPIDEM TARTRATE PRN MG: 5 TABLET, FILM COATED ORAL at 21:50

## 2023-07-27 RX ADMIN — Medication PRN TAB: at 19:28

## 2023-07-27 RX ADMIN — ARIPIPRAZOLE SCH MG: 5 TABLET ORAL at 08:21

## 2023-07-27 RX ADMIN — Medication PRN TAB: at 10:48

## 2023-07-27 RX ADMIN — GABAPENTIN SCH MG: 300 CAPSULE ORAL at 17:12

## 2023-07-27 RX ADMIN — CHLORDIAZEPOXIDE HYDROCHLORIDE PRN MG: 25 CAPSULE ORAL at 05:26

## 2023-07-27 RX ADMIN — Medication SCH OZ: at 08:22

## 2023-07-27 RX ADMIN — GABAPENTIN SCH MG: 300 CAPSULE ORAL at 08:21

## 2023-07-27 RX ADMIN — ACETAMINOPHEN PRN MG: 325 TABLET ORAL at 03:54

## 2023-07-27 NOTE — NUR
RN NOTE:-

 PT ANXIOUS AND YELLING AT THIS TIME AND REQUESTED MED ,LIBRIUM PO ADMINISTERED AS 
ORDERED.WILL CONTINUE TO MONITOR.

## 2023-07-27 NOTE — NUR
RN NOTES

PATIENT C/O LOWER BACK PAIN ON A PAIN SCALE OF 7/10. PATIENT REQUESTED NORCO 5/325MG PO X1 
DOSE ADMINISTERED ,AS ORDER .WILL CONTINUE TO MONITOR.

## 2023-07-27 NOTE — NUR
RN- CLOSING NOTES

 PATIENT AWAKE, SITTING IN WHEELCHAIR IN THE HALLWAY, BREATHING EVEN AND NON LABORED WITH NO 
S/S OF DISTRESS. PATIENT IS COOPERATIVE, GUARDED, NEEDY, ATTENTION SEEKINGS, ANXIOUS, 
LABILE, SUSPICIOUS, AGGRESSIVE AND ISOLATIVE. PATIENT IS MEDICATION COMPLAINT. ENCOURAGED 
PATIENT TO LEAVE ROOM AND SOCIALIZE WITH STAFF, PATIENT REFUSED. DENIES SI/HI AT THIS TIME. 
WILL CONTINUE TO MONITOR Q 15 MINUTES FOR SAFETY AND BEHAVIOR.

## 2023-07-27 NOTE — NUR
RN- NOTES : PAIN 

  PT.C/O  LOWER ABDOMEN PAIN NORCO ADMINISTERED DUE TO PATIENT COMPLAINTS OF 7/10 PAIN ,WILL 
CONTINUE TO MONITOR. .

## 2023-07-28 VITALS — SYSTOLIC BLOOD PRESSURE: 137 MMHG | DIASTOLIC BLOOD PRESSURE: 96 MMHG | TEMPERATURE: 98.1 F | OXYGEN SATURATION: 96 %

## 2023-07-28 VITALS — SYSTOLIC BLOOD PRESSURE: 129 MMHG | TEMPERATURE: 97.8 F | DIASTOLIC BLOOD PRESSURE: 69 MMHG | OXYGEN SATURATION: 98 %

## 2023-07-28 RX ADMIN — APIXABAN SCH MG: 5 TABLET, FILM COATED ORAL at 17:11

## 2023-07-28 RX ADMIN — GABAPENTIN SCH MG: 300 CAPSULE ORAL at 21:21

## 2023-07-28 RX ADMIN — ARIPIPRAZOLE SCH MG: 5 TABLET ORAL at 08:04

## 2023-07-28 RX ADMIN — Medication SCH MG: at 21:21

## 2023-07-28 RX ADMIN — GABAPENTIN SCH MG: 300 CAPSULE ORAL at 17:11

## 2023-07-28 RX ADMIN — POTASSIUM CHLORIDE SCH MEQ: 750 TABLET, FILM COATED, EXTENDED RELEASE ORAL at 11:06

## 2023-07-28 RX ADMIN — HYDROCODONE BITARTRATE AND ACETAMINOPHEN PRN TAB: 10; 325 TABLET ORAL at 10:53

## 2023-07-28 RX ADMIN — DILTIAZEM HYDROCHLORIDE SCH MG: 240 CAPSULE, EXTENDED RELEASE ORAL at 11:06

## 2023-07-28 RX ADMIN — ZOLPIDEM TARTRATE PRN MG: 5 TABLET, FILM COATED ORAL at 22:35

## 2023-07-28 RX ADMIN — Medication SCH OZ: at 08:05

## 2023-07-28 RX ADMIN — GABAPENTIN SCH MG: 300 CAPSULE ORAL at 13:17

## 2023-07-28 RX ADMIN — BUMETANIDE SCH MG: 1 TABLET ORAL at 11:07

## 2023-07-28 RX ADMIN — GABAPENTIN SCH MG: 300 CAPSULE ORAL at 08:04

## 2023-07-28 RX ADMIN — VENLAFAXINE HYDROCHLORIDE SCH MG: 150 CAPSULE, EXTENDED RELEASE ORAL at 08:04

## 2023-07-28 RX ADMIN — APIXABAN SCH MG: 5 TABLET, FILM COATED ORAL at 11:14

## 2023-07-28 RX ADMIN — DOCUSATE SODIUM SCH MG: 250 CAPSULE, LIQUID FILLED ORAL at 21:22

## 2023-07-28 RX ADMIN — Medication PRN TAB: at 02:36

## 2023-07-28 RX ADMIN — Medication SCH MG: at 15:08

## 2023-07-28 RX ADMIN — ALLOPURINOL SCH MG: 100 TABLET ORAL at 11:06

## 2023-07-28 RX ADMIN — ATORVASTATIN CALCIUM SCH MG: 10 TABLET, FILM COATED ORAL at 21:21

## 2023-07-28 NOTE — NUR
RN- NOTES : PAIN 

  PT.C/O  GENERALIZED BODY PAIN NORCO ADMINISTERED DUE TO PATIENT COMPLAINTS OF 7/10 PAIN 
,WILL CONTINUE TO MONITOR .

## 2023-07-28 NOTE — NUR
RN- NOTES : PAIN 

  PT.C/O  ABDOMINAL PAIN 8/10 , SCHEDULE  PO OXYCONTIN 10 MG  ADMINISTERED PER MD ORDERS  
,WILL CONTINUE TO MONITOR .
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
## 2023-07-29 VITALS — TEMPERATURE: 98 F | SYSTOLIC BLOOD PRESSURE: 131 MMHG | DIASTOLIC BLOOD PRESSURE: 95 MMHG | OXYGEN SATURATION: 97 %

## 2023-07-29 VITALS — SYSTOLIC BLOOD PRESSURE: 135 MMHG | TEMPERATURE: 98 F | OXYGEN SATURATION: 97 % | DIASTOLIC BLOOD PRESSURE: 77 MMHG

## 2023-07-29 VITALS — DIASTOLIC BLOOD PRESSURE: 74 MMHG | SYSTOLIC BLOOD PRESSURE: 145 MMHG | TEMPERATURE: 98.1 F | OXYGEN SATURATION: 98 %

## 2023-07-29 RX ADMIN — LEVOTHYROXINE SODIUM SCH MCG: 25 TABLET ORAL at 08:26

## 2023-07-29 RX ADMIN — ARIPIPRAZOLE SCH MG: 5 TABLET ORAL at 08:28

## 2023-07-29 RX ADMIN — CHLORDIAZEPOXIDE HYDROCHLORIDE PRN MG: 25 CAPSULE ORAL at 16:09

## 2023-07-29 RX ADMIN — ATORVASTATIN CALCIUM SCH MG: 10 TABLET, FILM COATED ORAL at 21:43

## 2023-07-29 RX ADMIN — DILTIAZEM HYDROCHLORIDE SCH MG: 240 CAPSULE, EXTENDED RELEASE ORAL at 08:30

## 2023-07-29 RX ADMIN — HYDROCODONE BITARTRATE AND ACETAMINOPHEN PRN TAB: 10; 325 TABLET ORAL at 23:47

## 2023-07-29 RX ADMIN — APIXABAN SCH MG: 5 TABLET, FILM COATED ORAL at 08:28

## 2023-07-29 RX ADMIN — GABAPENTIN SCH MG: 300 CAPSULE ORAL at 13:06

## 2023-07-29 RX ADMIN — POTASSIUM CHLORIDE SCH MEQ: 750 TABLET, FILM COATED, EXTENDED RELEASE ORAL at 08:27

## 2023-07-29 RX ADMIN — BUMETANIDE SCH MG: 1 TABLET ORAL at 08:27

## 2023-07-29 RX ADMIN — DOCUSATE SODIUM SCH MG: 250 CAPSULE, LIQUID FILLED ORAL at 21:42

## 2023-07-29 RX ADMIN — Medication SCH MG: at 08:27

## 2023-07-29 RX ADMIN — GABAPENTIN SCH MG: 300 CAPSULE ORAL at 16:52

## 2023-07-29 RX ADMIN — ALLOPURINOL SCH MG: 100 TABLET ORAL at 08:26

## 2023-07-29 RX ADMIN — VENLAFAXINE HYDROCHLORIDE SCH MG: 150 CAPSULE, EXTENDED RELEASE ORAL at 08:27

## 2023-07-29 RX ADMIN — APIXABAN SCH MG: 5 TABLET, FILM COATED ORAL at 16:53

## 2023-07-29 RX ADMIN — GABAPENTIN SCH MG: 300 CAPSULE ORAL at 20:02

## 2023-07-29 RX ADMIN — GABAPENTIN SCH MG: 300 CAPSULE ORAL at 08:27

## 2023-07-29 RX ADMIN — ZOLPIDEM TARTRATE PRN MG: 5 TABLET, FILM COATED ORAL at 21:42

## 2023-07-29 RX ADMIN — Medication SCH MG: at 20:04

## 2023-07-29 RX ADMIN — Medication SCH OZ: at 08:29

## 2023-07-29 NOTE — NUR
GPS RN NOTE, PATIENT HAS A COMPLAINT OF ITCHING ON RIGHT GLUTEAL FOLD AND IS REQUESTING 
BENADRYL AT THIS TIME.  PAGED [facility] [employer] AND INFORMED MASSIEL SMITH DNP OF MY FINDINGS. 
 MASSIEL SMITH DNP ORDERED BENADRYL 25MG PO Q6HR PRN FOR ITCHING.  ALL ORDERS NOTED AND CARRIED 
OUT WILL CONTINUE TO MONITOR THIS PATIENT WITH THE HELP OF STAFF.

## 2023-07-30 VITALS — SYSTOLIC BLOOD PRESSURE: 127 MMHG | OXYGEN SATURATION: 95 % | TEMPERATURE: 97.6 F

## 2023-07-30 VITALS — DIASTOLIC BLOOD PRESSURE: 95 MMHG | TEMPERATURE: 97.9 F | SYSTOLIC BLOOD PRESSURE: 142 MMHG | OXYGEN SATURATION: 95 %

## 2023-07-30 VITALS — SYSTOLIC BLOOD PRESSURE: 132 MMHG | DIASTOLIC BLOOD PRESSURE: 99 MMHG | OXYGEN SATURATION: 97 % | TEMPERATURE: 98 F

## 2023-07-30 RX ADMIN — GABAPENTIN SCH MG: 300 CAPSULE ORAL at 16:24

## 2023-07-30 RX ADMIN — ALLOPURINOL SCH MG: 100 TABLET ORAL at 08:43

## 2023-07-30 RX ADMIN — Medication SCH MG: at 10:13

## 2023-07-30 RX ADMIN — ACETAMINOPHEN PRN MG: 325 TABLET ORAL at 02:29

## 2023-07-30 RX ADMIN — GABAPENTIN SCH MG: 300 CAPSULE ORAL at 08:43

## 2023-07-30 RX ADMIN — CHLORDIAZEPOXIDE HYDROCHLORIDE PRN MG: 25 CAPSULE ORAL at 14:56

## 2023-07-30 RX ADMIN — ZOLPIDEM TARTRATE PRN MG: 5 TABLET, FILM COATED ORAL at 21:37

## 2023-07-30 RX ADMIN — APIXABAN SCH MG: 5 TABLET, FILM COATED ORAL at 16:25

## 2023-07-30 RX ADMIN — BUMETANIDE SCH MG: 1 TABLET ORAL at 08:44

## 2023-07-30 RX ADMIN — GABAPENTIN SCH MG: 300 CAPSULE ORAL at 13:02

## 2023-07-30 RX ADMIN — HYDROCODONE BITARTRATE AND ACETAMINOPHEN PRN TAB: 10; 325 TABLET ORAL at 07:48

## 2023-07-30 RX ADMIN — DILTIAZEM HYDROCHLORIDE SCH MG: 240 CAPSULE, EXTENDED RELEASE ORAL at 08:44

## 2023-07-30 RX ADMIN — DOCUSATE SODIUM SCH MG: 250 CAPSULE, LIQUID FILLED ORAL at 21:36

## 2023-07-30 RX ADMIN — Medication SCH OZ: at 09:19

## 2023-07-30 RX ADMIN — ARIPIPRAZOLE SCH MG: 5 TABLET ORAL at 08:43

## 2023-07-30 RX ADMIN — APIXABAN SCH MG: 5 TABLET, FILM COATED ORAL at 08:45

## 2023-07-30 RX ADMIN — Medication SCH MG: at 20:07

## 2023-07-30 RX ADMIN — Medication SCH MG: at 08:55

## 2023-07-30 RX ADMIN — VENLAFAXINE HYDROCHLORIDE SCH MG: 150 CAPSULE, EXTENDED RELEASE ORAL at 08:46

## 2023-07-30 RX ADMIN — BUMETANIDE SCH MG: 1 TABLET ORAL at 08:56

## 2023-07-30 RX ADMIN — GABAPENTIN SCH MG: 300 CAPSULE ORAL at 20:07

## 2023-07-30 RX ADMIN — POTASSIUM CHLORIDE SCH MEQ: 750 TABLET, FILM COATED, EXTENDED RELEASE ORAL at 08:45

## 2023-07-30 RX ADMIN — ATORVASTATIN CALCIUM SCH MG: 10 TABLET, FILM COATED ORAL at 21:36

## 2023-07-30 RX ADMIN — LEVOTHYROXINE SODIUM SCH MCG: 25 TABLET ORAL at 07:47

## 2023-07-30 NOTE — NUR
2142 Ambien 5mg given per patient requested for insomia.

2347 Norco  given for c/o severe abdominal pain.

2229 Tylenol 650mg given for c/o of abdominal pain.

0310 Patient sleeping with no sign of distress or discomfort. Will continue monitor.

## 2023-07-30 NOTE — NUR
RN- CLOSING NOTES

 PATIENT AWAKE, RESTING IN BED, BREATHING EVEN AND NON LABORED WITH NO S/S OF DISTRESS. 
PATIENT IS COOPERATIVE, GUARDED, NEEDY, ATTENTION SEEKING, ANXIOUS, LABILE, SUSPICIOUS, AND 
ISOLATIVE. PATIENT IS MEDICATION COMPLAINT. PATIENT CONTINUES TO COMPLAIN OF PAIN 
INTERMITTENTLY THROUGHOUT THE DAY. PATIENT COMPLAINTS OF INCREASED ANXIETY, PRN MEDICATION 
ADMINISTERED. ENCOURAGED PATIENT TO LEAVE ROOM AND SOCIALIZE WITH STAFF, PATIENT REFUSED. 
DENIES SI/HI AT THIS TIME. WILL CONTINUE TO MONITOR Q 15 MINUTES FOR SAFETY AND BEHAVIOR.

## 2023-07-30 NOTE — NUR
GPS RN OPENING NOTE



RECEIVED PT AWAKE IN BED. PT STABLE ON ROOM AIR. NO SOB OR S/S OF RESPIRATORY DISTRESS. 
BREATHING EVEN AND UNLABORED. PT IS COOPERATIVE, GUARDED, NEEDY, ATTENTION SEEKING, ANXIOUS, 
LABILE, SUSPICIOUS, MED COMPLIANT, AND ISOLATIVE. DENIES SI/HI AT THIS TIME. WILL CONTINUE 
TO MONITOR Q15MIN FOR SAFETY AND BEHAVIOR.

## 2023-07-30 NOTE — NUR
RN- NOTES

 OXYCODONE ADMINISTERED DUE TO PATIENT COMPLAINTS OF 8/10 PAIN ON LOWER ABDOMEN.

-------------------------------------------------------------------------------

Addendum: 07/30/23 at 0751 by PASTORA HIDALGO RN

-------------------------------------------------------------------------------

INCORRECT MEDICATION NAME- NORCO ADMINISTERED

## 2023-07-30 NOTE — NUR
RN- NOTES

 LIBRIUM ADMINISTERED DUE TO PATIENT COMPLAINTS OF INCREASED ANXIETY AND ATTEMPTING TO PEEL 
SKIN OFF OF BODY.

## 2023-07-30 NOTE — NUR
RN NOTE



PT COMPLAINED OF INSOMNIA. ADMINISTERED AMBIEN 5 MG FOR SLEEP AS ORDERED. MADE COMFORTABLE 
IN BED. ALL NEEDS MET AT THIS TIME.

## 2023-07-31 VITALS — OXYGEN SATURATION: 97 % | SYSTOLIC BLOOD PRESSURE: 110 MMHG | DIASTOLIC BLOOD PRESSURE: 60 MMHG | TEMPERATURE: 98 F

## 2023-07-31 VITALS — OXYGEN SATURATION: 96 % | TEMPERATURE: 98.7 F | DIASTOLIC BLOOD PRESSURE: 95 MMHG | SYSTOLIC BLOOD PRESSURE: 125 MMHG

## 2023-07-31 VITALS — DIASTOLIC BLOOD PRESSURE: 80 MMHG | SYSTOLIC BLOOD PRESSURE: 153 MMHG | OXYGEN SATURATION: 98 % | TEMPERATURE: 98.8 F

## 2023-07-31 LAB
BUN SERPL-MCNC: 17 MG/DL (ref 7–18)
CALCIUM SERPL-MCNC: 9.6 MG/DL (ref 8.5–10.1)
CHLORIDE SERPL-SCNC: 107 MMOL/L (ref 98–107)
CO2 SERPL-SCNC: 25 MMOL/L (ref 21–32)
CREAT SERPL-MCNC: 1 MG/DL (ref 0.6–1.3)
GLUCOSE SERPL-MCNC: 105 MG/DL (ref 74–106)
POTASSIUM SERPL-SCNC: 4.2 MMOL/L (ref 3.5–5.1)
SODIUM SERPL-SCNC: 140 MMOL/L (ref 136–145)

## 2023-07-31 RX ADMIN — APIXABAN SCH MG: 5 TABLET, FILM COATED ORAL at 08:16

## 2023-07-31 RX ADMIN — BUMETANIDE SCH MG: 1 TABLET ORAL at 08:59

## 2023-07-31 RX ADMIN — Medication SCH MG: at 08:28

## 2023-07-31 RX ADMIN — LEVOTHYROXINE SODIUM SCH MCG: 25 TABLET ORAL at 08:07

## 2023-07-31 RX ADMIN — NYSTATIN PRN GM: 100000 CREAM TOPICAL at 10:22

## 2023-07-31 RX ADMIN — ATORVASTATIN CALCIUM SCH MG: 10 TABLET, FILM COATED ORAL at 21:30

## 2023-07-31 RX ADMIN — GABAPENTIN SCH MG: 300 CAPSULE ORAL at 20:53

## 2023-07-31 RX ADMIN — Medication SCH OZ: at 08:42

## 2023-07-31 RX ADMIN — BUMETANIDE SCH MG: 1 TABLET ORAL at 08:13

## 2023-07-31 RX ADMIN — GABAPENTIN SCH MG: 300 CAPSULE ORAL at 18:06

## 2023-07-31 RX ADMIN — CHLORDIAZEPOXIDE HYDROCHLORIDE PRN MG: 25 CAPSULE ORAL at 20:20

## 2023-07-31 RX ADMIN — HYDROCODONE BITARTRATE AND ACETAMINOPHEN PRN TAB: 10; 325 TABLET ORAL at 14:29

## 2023-07-31 RX ADMIN — APIXABAN SCH MG: 5 TABLET, FILM COATED ORAL at 18:07

## 2023-07-31 RX ADMIN — GABAPENTIN SCH MG: 300 CAPSULE ORAL at 08:15

## 2023-07-31 RX ADMIN — ZOLPIDEM TARTRATE PRN MG: 5 TABLET, FILM COATED ORAL at 22:08

## 2023-07-31 RX ADMIN — Medication SCH MG: at 20:53

## 2023-07-31 RX ADMIN — POTASSIUM CHLORIDE SCH MEQ: 750 TABLET, FILM COATED, EXTENDED RELEASE ORAL at 08:13

## 2023-07-31 RX ADMIN — ALLOPURINOL SCH MG: 100 TABLET ORAL at 08:13

## 2023-07-31 RX ADMIN — NYSTATIN PRN GM: 100000 CREAM TOPICAL at 18:13

## 2023-07-31 RX ADMIN — DILTIAZEM HYDROCHLORIDE SCH MG: 240 CAPSULE, EXTENDED RELEASE ORAL at 08:28

## 2023-07-31 RX ADMIN — GABAPENTIN SCH MG: 300 CAPSULE ORAL at 12:48

## 2023-07-31 RX ADMIN — DOCUSATE SODIUM SCH MG: 250 CAPSULE, LIQUID FILLED ORAL at 21:30

## 2023-07-31 RX ADMIN — ARIPIPRAZOLE SCH MG: 5 TABLET ORAL at 08:13

## 2023-07-31 RX ADMIN — CHLORDIAZEPOXIDE HYDROCHLORIDE PRN MG: 25 CAPSULE ORAL at 04:24

## 2023-07-31 RX ADMIN — VENLAFAXINE HYDROCHLORIDE SCH MG: 150 CAPSULE, EXTENDED RELEASE ORAL at 08:14

## 2023-07-31 NOTE — NUR
RN OPENING NOTE



RECEIVED PATIENT AWAKE IN BED, STABLE ON ROOM AIR. NO SOB OR S/S OF RESPIRATORY DISTRESS. 
BREATHING EVEN AND UNLABORED. PATIENT IS COOPERATIVE, GUARDED, NEEDY, ATTENTION SEEKING, 
ANXIOUS, LABILE, SUSPICIOUS AND ISOLATIVE. DENIES SI/HI AT THIS TIME. WILL CONTINUE TO 
MONITOR Q15MIN FOR SAFETY AND BEHAVIOR.

## 2023-07-31 NOTE — NUR
RN NOTE



PATIENT REFUSED TO TAKE BUMETANIDE. RISKS AND BENEFITS REITERATED COMPLYING TO MEDICATION 3X 
BUT PATIENT IS SHOUTING AND REFUSING MEDICATION. WILL CONTINUE TO MONITOR THE PATIENT.

## 2023-07-31 NOTE — NUR
RN NOTE



PT COMPLAINED OF ANXIETY. ADMINISTERED LIBRIUM FOR ANXIETY AS ORDERED. ALL NEEDS MET AT THIS 
TIME.

## 2023-07-31 NOTE — NUR
RN NOTE



CALLED DR. [facility] TO FOLLOW ON THE CONSULT  FOR SEVERE STOMACH PAIN, WAS INFORMED 
THAT THEY WILL JUST CALLBACK. WILL CONTINUE TO MONITOR THE PATIENT.

## 2023-08-01 VITALS — DIASTOLIC BLOOD PRESSURE: 72 MMHG | SYSTOLIC BLOOD PRESSURE: 136 MMHG

## 2023-08-01 RX ADMIN — GABAPENTIN SCH MG: 300 CAPSULE ORAL at 12:20

## 2023-08-01 RX ADMIN — ARIPIPRAZOLE SCH MG: 5 TABLET ORAL at 08:04

## 2023-08-01 RX ADMIN — POTASSIUM CHLORIDE SCH MEQ: 750 TABLET, FILM COATED, EXTENDED RELEASE ORAL at 08:01

## 2023-08-01 RX ADMIN — Medication SCH MG: at 08:04

## 2023-08-01 RX ADMIN — Medication SCH OZ: at 08:07

## 2023-08-01 RX ADMIN — LEVOTHYROXINE SODIUM SCH MCG: 25 TABLET ORAL at 08:04

## 2023-08-01 RX ADMIN — HYDROCODONE BITARTRATE AND ACETAMINOPHEN PRN TAB: 10; 325 TABLET ORAL at 12:20

## 2023-08-01 RX ADMIN — CHLORDIAZEPOXIDE HYDROCHLORIDE PRN MG: 25 CAPSULE ORAL at 04:10

## 2023-08-01 RX ADMIN — APIXABAN SCH MG: 5 TABLET, FILM COATED ORAL at 09:04

## 2023-08-01 RX ADMIN — ALLOPURINOL SCH MG: 100 TABLET ORAL at 08:04

## 2023-08-01 RX ADMIN — DILTIAZEM HYDROCHLORIDE SCH MG: 240 CAPSULE, EXTENDED RELEASE ORAL at 08:03

## 2023-08-01 RX ADMIN — BUMETANIDE SCH MG: 1 TABLET ORAL at 08:04

## 2023-08-01 RX ADMIN — VENLAFAXINE HYDROCHLORIDE SCH MG: 150 CAPSULE, EXTENDED RELEASE ORAL at 08:04

## 2023-08-01 RX ADMIN — GABAPENTIN SCH MG: 300 CAPSULE ORAL at 08:04

## 2023-08-01 NOTE — NUR
GPS OPENING NOTE:



RECEIVED PATIENT AWAKE IN BED, STABLE ON ROOM AIR. NO SOB OR S/S OF RESPIRATORY DISTRESS. 
BREATHING EVEN AND UNLABORED. PATIENT IS COOPERATIVE, GUARDED, NEEDY, ATTENTION SEEKING, 
ANXIOUS, LABILE, SUSPICIOUS AND ISOLATIVE. DENIES SI/HI AT THIS TIME. WILL CONTINUE TO 
MONITOR Q15MIN FOR SAFETY AND BEHAVIOR.

## 2023-08-01 NOTE — NUR
SW Discharge Note:

Patient will be discharged back home located at [address] [place], [place] 
[address]; ([phone]). Patients daughter Rain ([phone]) will  pt between 
1-2PM. Patient is alert and oriented x2. Patient happy to be going back home. Patient denies 
suicidal or homicidal ideation. Patient denies visual/auditory hallucinations. Patient will 
follow up with primary doctor, Dr. Matthews located at [id number] [place] [address] 
[address]; ([phone]) on via telehealth August 1st via telehealth at 3:15PM who will monitor 
and provide psychotropic medications and requested a GI doctor for a referral. Patient 
presents with euthymic mood and congruent affect.

## 2023-08-01 NOTE — NUR
GPS DISCHARGE NOTE:



THIS IS A 77YEAR OLD FEMALE DISCHARGE TO HOME  IN STABLE CONDITION. COMPLIANT WITH 
MEDICATIONS, COOPERATIVE WITH TREATMENT PLANS. PT DENIES SI/HI AND INSTRUCTED TO GO TO THE 
CLOSEST ER IF DEVELOPING SI/HI. BEHAVIOR IMPROVED, PSYCHIATRIC, TX PLANS MET, MEDICAL TX  
PLANS AND DEFERRED FOR CONTINUAL MONITORING. EDUCATED PT ABOUT AFTER CARE PLAN (EXIT CARE) 
AND COPY PROVIDED. RETURNED PERSONAL BELONGINGS TO PT. MED RECON WITH DR. HOWARD PSYC, DR WILKES MED INTERNIST. PT SIGNED D/C PAPERWORK. WOUNDS PICTURE TAKEN AND DOCUMENTED IN 
CHART. PT LEFT UNIT AT 1300 VIA PRIVATE TRANSPORTATION. ASSISTED BY CNA TO CAR. DAUGHTER 
SIGN AND TOOK RESPONSIBILITY OF CARE. PT LEFT IN STABLE CONDITION.

## 2023-08-01 NOTE — NUR
EWELINA Coordination of Care:



Patient will follow up with primary doctor, Dr. Matthews located at [id number] [place] [address] [address], [place] [address]; ([phone]) on via telehealth August 1st at 3:15PM who will monitor and 
provide psychotropic medications and requested a GI doctor for a referral.

## 2023-08-04 ENCOUNTER — HOSPITAL ENCOUNTER (INPATIENT)
Dept: HOSPITAL 12 - ER | Age: 78
LOS: 3 days | Discharge: NURSING, REHAB OR LONG-TERM CARE | DRG: 308 | End: 2023-08-07
Payer: MEDICARE

## 2023-08-04 VITALS — SYSTOLIC BLOOD PRESSURE: 85 MMHG | DIASTOLIC BLOOD PRESSURE: 65 MMHG | OXYGEN SATURATION: 94 %

## 2023-08-04 VITALS — SYSTOLIC BLOOD PRESSURE: 117 MMHG | OXYGEN SATURATION: 92 % | DIASTOLIC BLOOD PRESSURE: 62 MMHG

## 2023-08-04 VITALS — DIASTOLIC BLOOD PRESSURE: 63 MMHG | SYSTOLIC BLOOD PRESSURE: 110 MMHG | OXYGEN SATURATION: 93 %

## 2023-08-04 VITALS — TEMPERATURE: 98.2 F | DIASTOLIC BLOOD PRESSURE: 62 MMHG | SYSTOLIC BLOOD PRESSURE: 94 MMHG | OXYGEN SATURATION: 92 %

## 2023-08-04 VITALS — OXYGEN SATURATION: 95 %

## 2023-08-04 VITALS — SYSTOLIC BLOOD PRESSURE: 154 MMHG | OXYGEN SATURATION: 94 % | DIASTOLIC BLOOD PRESSURE: 83 MMHG

## 2023-08-04 VITALS — DIASTOLIC BLOOD PRESSURE: 62 MMHG | TEMPERATURE: 98.2 F | SYSTOLIC BLOOD PRESSURE: 94 MMHG | OXYGEN SATURATION: 91 %

## 2023-08-04 VITALS — OXYGEN SATURATION: 93 % | SYSTOLIC BLOOD PRESSURE: 96 MMHG | DIASTOLIC BLOOD PRESSURE: 67 MMHG

## 2023-08-04 VITALS — SYSTOLIC BLOOD PRESSURE: 111 MMHG | DIASTOLIC BLOOD PRESSURE: 68 MMHG | OXYGEN SATURATION: 96 %

## 2023-08-04 VITALS — BODY MASS INDEX: 38.92 KG/M2 | WEIGHT: 248 LBS | HEIGHT: 67 IN

## 2023-08-04 VITALS — SYSTOLIC BLOOD PRESSURE: 129 MMHG | TEMPERATURE: 98 F | DIASTOLIC BLOOD PRESSURE: 75 MMHG | OXYGEN SATURATION: 94 %

## 2023-08-04 VITALS — DIASTOLIC BLOOD PRESSURE: 69 MMHG | OXYGEN SATURATION: 94 % | SYSTOLIC BLOOD PRESSURE: 98 MMHG

## 2023-08-04 VITALS — SYSTOLIC BLOOD PRESSURE: 98 MMHG | DIASTOLIC BLOOD PRESSURE: 69 MMHG | OXYGEN SATURATION: 94 %

## 2023-08-04 VITALS — OXYGEN SATURATION: 93 % | DIASTOLIC BLOOD PRESSURE: 66 MMHG | SYSTOLIC BLOOD PRESSURE: 117 MMHG

## 2023-08-04 DIAGNOSIS — E44.0: ICD-10-CM

## 2023-08-04 DIAGNOSIS — I10: ICD-10-CM

## 2023-08-04 DIAGNOSIS — M48.54XA: ICD-10-CM

## 2023-08-04 DIAGNOSIS — F33.2: ICD-10-CM

## 2023-08-04 DIAGNOSIS — K59.00: ICD-10-CM

## 2023-08-04 DIAGNOSIS — I48.91: Primary | ICD-10-CM

## 2023-08-04 DIAGNOSIS — R45.851: ICD-10-CM

## 2023-08-04 DIAGNOSIS — F91.9: ICD-10-CM

## 2023-08-04 DIAGNOSIS — M48.04: ICD-10-CM

## 2023-08-04 DIAGNOSIS — Z98.1: ICD-10-CM

## 2023-08-04 DIAGNOSIS — Z79.899: ICD-10-CM

## 2023-08-04 DIAGNOSIS — K44.9: ICD-10-CM

## 2023-08-04 DIAGNOSIS — E66.9: ICD-10-CM

## 2023-08-04 DIAGNOSIS — G89.4: ICD-10-CM

## 2023-08-04 DIAGNOSIS — F41.1: ICD-10-CM

## 2023-08-04 DIAGNOSIS — F11.20: ICD-10-CM

## 2023-08-04 DIAGNOSIS — J44.1: ICD-10-CM

## 2023-08-04 DIAGNOSIS — E03.9: ICD-10-CM

## 2023-08-04 DIAGNOSIS — Z20.822: ICD-10-CM

## 2023-08-04 DIAGNOSIS — J96.01: ICD-10-CM

## 2023-08-04 DIAGNOSIS — Z79.84: ICD-10-CM

## 2023-08-04 DIAGNOSIS — E11.9: ICD-10-CM

## 2023-08-04 DIAGNOSIS — Z79.01: ICD-10-CM

## 2023-08-04 DIAGNOSIS — E87.20: ICD-10-CM

## 2023-08-04 DIAGNOSIS — Z79.890: ICD-10-CM

## 2023-08-04 DIAGNOSIS — E78.5: ICD-10-CM

## 2023-08-04 DIAGNOSIS — N17.0: ICD-10-CM

## 2023-08-04 DIAGNOSIS — Z79.4: ICD-10-CM

## 2023-08-04 DIAGNOSIS — E88.09: ICD-10-CM

## 2023-08-04 LAB
ALBUMIN SERPL BCG-MCNC: 3 G/DL (ref 3.4–5)
ALP SERPL-CCNC: 88 U/L (ref 50–136)
ALT SERPL W/O P-5'-P-CCNC: 18 U/L (ref 14–59)
AMPHETAMINES UR QL SCN>1000 NG/ML: NEGATIVE
APPEARANCE UR: CLEAR
AST SERPL-CCNC: 13 U/L (ref 15–37)
BARBITURATES UR QL SCN: NEGATIVE
BASE EXCESS BLDA CALC-SCNC: -0.6 MMOL/L
BASOPHILS # BLD AUTO: 0 K/UL (ref 0–0.2)
BASOPHILS NFR BLD AUTO: 0.3 % (ref 0–2)
BENZODIAZ UR QL SCN: POSITIVE
BILIRUB DIRECT SERPL-MCNC: 0.1 MG/DL (ref 0–0.2)
BILIRUB SERPL-MCNC: 0.3 MG/DL (ref 0.2–1)
BILIRUB UR QL STRIP: NEGATIVE
BUN SERPL-MCNC: 27 MG/DL (ref 7–18)
CALCIUM SERPL-MCNC: 9.1 MG/DL (ref 8.5–10.1)
CHLORIDE SERPL-SCNC: 101 MMOL/L (ref 98–107)
CO2 SERPL-SCNC: 28 MMOL/L (ref 21–32)
COCAINE UR QL SCN: NEGATIVE
COLOR UR: YELLOW
CREAT SERPL-MCNC: 1.5 MG/DL (ref 0.6–1.3)
EOSINOPHIL # BLD AUTO: 0 K/UL (ref 0–0.7)
EOSINOPHIL NFR BLD AUTO: 0.4 % (ref 0–7)
ERYTHROCYTE [DISTWIDTH] IN BLOOD BY AUTOMATED COUNT: 18.5 % (ref 12.3–17.7)
ETHANOL SERPL-MCNC: < 3 MG/DL (ref 0–10)
FENTANYL UR-MCNC: NEGATIVE NG/ML
GLUCOSE SERPL-MCNC: 118 MG/DL (ref 74–106)
GLUCOSE UR STRIP-MCNC: NEGATIVE MG/DL
HCO3 BLDA-SCNC: 24.1 MMOL/L
HCT VFR BLD AUTO: 37.8 % (ref 31.2–41.9)
HGB BLD-MCNC: 12 G/DL (ref 10.9–14.3)
HGB BLDA OXIMETRY-MCNC: 12.8 G/DL (ref 12–16)
HGB UR QL STRIP: NEGATIVE
INHALED O2 CONCENTRATION: 28 %
INHALED O2 FLOW RATE: 2 L/MIN (ref 0–30)
KETONES UR STRIP-MCNC: NEGATIVE MG/DL
LACTATE SERPL-SCNC: 2.1 MMOL/L (ref 0.4–2)
LEUKOCYTE ESTERASE UR QL STRIP: NEGATIVE
LIPASE SERPL-CCNC: 39 U/L (ref 73–393)
LYMPHOCYTES # BLD AUTO: 0.4 K/UL (ref 0.8–4.8)
LYMPHOCYTES NFR BLD AUTO: 3.8 % (ref 20.5–51.5)
MANUAL DIF COMMENT BLD-IMP: 0
MCH RBC QN AUTO: 29.1 UUG (ref 24.7–32.8)
MCHC RBC AUTO-ENTMCNC: 32 G/DL (ref 32.3–35.6)
MCV RBC AUTO: 91.7 FL (ref 75.5–95.3)
MONOCYTES # BLD AUTO: 0.4 K/UL (ref 0.1–1.3)
MONOCYTES NFR BLD AUTO: 3.8 % (ref 0–11)
NEUTROPHILS # BLD AUTO: 9.9 K/UL (ref 1.8–8.9)
NEUTROPHILS NFR BLD AUTO: 91.7 % (ref 38.5–71.5)
NITRITE UR QL STRIP: NEGATIVE
NT-PROBNP SERPL-MCNC: 1125 PG/ML (ref 0–125)
OPIATES UR QL SCN: NEGATIVE
PCO2 TEMP ADJ BLDA: 39.9 MMHG (ref 35–45)
PCP UR QL SCN>25 NG/ML: NEGATIVE
PH TEMP ADJ BLDA: 7.4 [PH] (ref 7.35–7.45)
PH UR STRIP: 6 [PH] (ref 5–8)
PLATELET # BLD AUTO: 355 K/UL (ref 179–408)
PO2 TEMP ADJ BLDA: 70.1 MMHG (ref 75–100)
POTASSIUM SERPL-SCNC: 3.7 MMOL/L (ref 3.5–5.1)
PROT UR QL STRIP: NEGATIVE
RBC # BLD AUTO: 4.13 MIL/UL (ref 3.63–4.92)
SODIUM SERPL-SCNC: 140 MMOL/L (ref 136–145)
SP GR UR STRIP: 1.01 (ref 1–1.03)
SPECIMEN DRAWN FROM PATIENT: (no result)
THC UR QL SCN>50 NG/ML: NEGATIVE
UROBILINOGEN UR STRIP-MCNC: 0.2 E.U./DL
WBC # BLD AUTO: 10.8 K/UL (ref 3.8–11.8)
WS STN SPEC: 7.2 G/DL (ref 6.4–8.2)

## 2023-08-04 PROCEDURE — A4663 DIALYSIS BLOOD PRESSURE CUFF: HCPCS

## 2023-08-04 PROCEDURE — G0378 HOSPITAL OBSERVATION PER HR: HCPCS

## 2023-08-04 PROCEDURE — C1758 CATHETER, URETERAL: HCPCS

## 2023-08-04 PROCEDURE — G0480 DRUG TEST DEF 1-7 CLASSES: HCPCS

## 2023-08-04 RX ADMIN — LORAZEPAM PRN MG: 2 INJECTION INTRAMUSCULAR; INTRAVENOUS at 20:56

## 2023-08-04 RX ADMIN — Medication SCH EACH: at 16:22

## 2023-08-04 RX ADMIN — HYDROCODONE BITARTRATE AND ACETAMINOPHEN PRN TAB: 10; 325 TABLET ORAL at 20:58

## 2023-08-04 RX ADMIN — ATORVASTATIN CALCIUM SCH MG: 10 TABLET, FILM COATED ORAL at 20:56

## 2023-08-04 RX ADMIN — Medication SCH EACH: at 21:00

## 2023-08-04 RX ADMIN — Medication SCH EA: at 16:37

## 2023-08-05 VITALS — OXYGEN SATURATION: 94 % | DIASTOLIC BLOOD PRESSURE: 81 MMHG | SYSTOLIC BLOOD PRESSURE: 146 MMHG

## 2023-08-05 VITALS — SYSTOLIC BLOOD PRESSURE: 133 MMHG | TEMPERATURE: 98.1 F | DIASTOLIC BLOOD PRESSURE: 64 MMHG | OXYGEN SATURATION: 94 %

## 2023-08-05 VITALS — DIASTOLIC BLOOD PRESSURE: 64 MMHG | OXYGEN SATURATION: 93 % | SYSTOLIC BLOOD PRESSURE: 120 MMHG | TEMPERATURE: 98.2 F

## 2023-08-05 VITALS — OXYGEN SATURATION: 94 % | SYSTOLIC BLOOD PRESSURE: 122 MMHG | TEMPERATURE: 98 F | DIASTOLIC BLOOD PRESSURE: 66 MMHG

## 2023-08-05 VITALS — OXYGEN SATURATION: 94 % | DIASTOLIC BLOOD PRESSURE: 72 MMHG | TEMPERATURE: 98 F | SYSTOLIC BLOOD PRESSURE: 133 MMHG

## 2023-08-05 VITALS — DIASTOLIC BLOOD PRESSURE: 79 MMHG | SYSTOLIC BLOOD PRESSURE: 126 MMHG | OXYGEN SATURATION: 94 %

## 2023-08-05 VITALS — OXYGEN SATURATION: 93 % | SYSTOLIC BLOOD PRESSURE: 147 MMHG | DIASTOLIC BLOOD PRESSURE: 88 MMHG

## 2023-08-05 VITALS — DIASTOLIC BLOOD PRESSURE: 76 MMHG | OXYGEN SATURATION: 94 % | SYSTOLIC BLOOD PRESSURE: 127 MMHG

## 2023-08-05 VITALS — SYSTOLIC BLOOD PRESSURE: 121 MMHG | OXYGEN SATURATION: 93 % | DIASTOLIC BLOOD PRESSURE: 73 MMHG

## 2023-08-05 VITALS — OXYGEN SATURATION: 94 %

## 2023-08-05 VITALS — SYSTOLIC BLOOD PRESSURE: 137 MMHG | DIASTOLIC BLOOD PRESSURE: 82 MMHG | OXYGEN SATURATION: 95 %

## 2023-08-05 VITALS — DIASTOLIC BLOOD PRESSURE: 74 MMHG | OXYGEN SATURATION: 93 % | SYSTOLIC BLOOD PRESSURE: 126 MMHG

## 2023-08-05 VITALS — OXYGEN SATURATION: 93 % | SYSTOLIC BLOOD PRESSURE: 151 MMHG | TEMPERATURE: 97.5 F | DIASTOLIC BLOOD PRESSURE: 76 MMHG

## 2023-08-05 VITALS — DIASTOLIC BLOOD PRESSURE: 83 MMHG | SYSTOLIC BLOOD PRESSURE: 134 MMHG | OXYGEN SATURATION: 95 %

## 2023-08-05 VITALS — DIASTOLIC BLOOD PRESSURE: 74 MMHG | SYSTOLIC BLOOD PRESSURE: 124 MMHG | OXYGEN SATURATION: 93 %

## 2023-08-05 VITALS — TEMPERATURE: 97.8 F | SYSTOLIC BLOOD PRESSURE: 121 MMHG | DIASTOLIC BLOOD PRESSURE: 77 MMHG | OXYGEN SATURATION: 93 %

## 2023-08-05 VITALS — DIASTOLIC BLOOD PRESSURE: 71 MMHG | OXYGEN SATURATION: 94 % | SYSTOLIC BLOOD PRESSURE: 125 MMHG

## 2023-08-05 LAB
BASOPHILS # BLD AUTO: 0 K/UL (ref 0–0.2)
BASOPHILS NFR BLD AUTO: 0.1 % (ref 0–2)
BUN SERPL-MCNC: 25 MG/DL (ref 7–18)
CALCIUM SERPL-MCNC: 8.8 MG/DL (ref 8.5–10.1)
CHLORIDE SERPL-SCNC: 105 MMOL/L (ref 98–107)
CO2 SERPL-SCNC: 27 MMOL/L (ref 21–32)
CREAT SERPL-MCNC: 1.3 MG/DL (ref 0.6–1.3)
EOSINOPHIL # BLD AUTO: 0 K/UL (ref 0–0.7)
EOSINOPHIL NFR BLD AUTO: 0 % (ref 0–7)
ERYTHROCYTE [DISTWIDTH] IN BLOOD BY AUTOMATED COUNT: 18.7 % (ref 12.3–17.7)
GLUCOSE SERPL-MCNC: 183 MG/DL (ref 74–106)
HCT VFR BLD AUTO: 36.8 % (ref 31.2–41.9)
HGB BLD-MCNC: 11.7 G/DL (ref 10.9–14.3)
LYMPHOCYTES # BLD AUTO: 0.5 K/UL (ref 0.8–4.8)
LYMPHOCYTES NFR BLD AUTO: 7 % (ref 20.5–51.5)
MAGNESIUM SERPL-MCNC: 2 MG/DL (ref 1.8–2.4)
MANUAL DIF COMMENT BLD-IMP: 1
MCH RBC QN AUTO: 29.1 UUG (ref 24.7–32.8)
MCHC RBC AUTO-ENTMCNC: 32 G/DL (ref 32.3–35.6)
MCV RBC AUTO: 91.6 FL (ref 75.5–95.3)
MONOCYTES # BLD AUTO: 0.1 K/UL (ref 0.1–1.3)
MONOCYTES NFR BLD AUTO: 1.4 % (ref 0–11)
NEUTROPHILS # BLD AUTO: 7.1 K/UL (ref 1.8–8.9)
NEUTROPHILS NFR BLD AUTO: 91.5 % (ref 38.5–71.5)
PHOSPHATE SERPL-MCNC: 3.4 MG/DL (ref 2.5–4.9)
PLATELET # BLD AUTO: 337 K/UL (ref 179–408)
POTASSIUM SERPL-SCNC: 4 MMOL/L (ref 3.5–5.1)
RBC # BLD AUTO: 4.02 MIL/UL (ref 3.63–4.92)
SODIUM SERPL-SCNC: 141 MMOL/L (ref 136–145)
WBC # BLD AUTO: 7.8 K/UL (ref 3.8–11.8)

## 2023-08-05 RX ADMIN — CLONIDINE HYDROCHLORIDE SCH MG: 0.1 TABLET ORAL at 21:08

## 2023-08-05 RX ADMIN — SODIUM CHLORIDE PRN UNIT: 9 INJECTION, SOLUTION INTRAVENOUS at 17:24

## 2023-08-05 RX ADMIN — Medication SCH EA: at 13:41

## 2023-08-05 RX ADMIN — APIXABAN SCH MG: 5 TABLET, FILM COATED ORAL at 17:21

## 2023-08-05 RX ADMIN — Medication SCH EACH: at 12:00

## 2023-08-05 RX ADMIN — LORAZEPAM PRN MG: 2 INJECTION INTRAMUSCULAR; INTRAVENOUS at 03:46

## 2023-08-05 RX ADMIN — HYDROCODONE BITARTRATE AND ACETAMINOPHEN PRN TAB: 10; 325 TABLET ORAL at 04:10

## 2023-08-05 RX ADMIN — APIXABAN SCH MG: 5 TABLET, FILM COATED ORAL at 08:19

## 2023-08-05 RX ADMIN — SODIUM CHLORIDE PRN UNIT: 9 INJECTION, SOLUTION INTRAVENOUS at 12:04

## 2023-08-05 RX ADMIN — DILTIAZEM HYDROCHLORIDE SCH MG: 240 CAPSULE, EXTENDED RELEASE ORAL at 09:10

## 2023-08-05 RX ADMIN — FERROUS SULFATE TAB 325 MG (65 MG ELEMENTAL FE) SCH MG: 325 (65 FE) TAB at 08:19

## 2023-08-05 RX ADMIN — DIGOXIN SCH MCG: 0.25 INJECTION INTRAMUSCULAR; INTRAVENOUS at 09:57

## 2023-08-05 RX ADMIN — Medication SCH ML: at 08:22

## 2023-08-05 RX ADMIN — GABAPENTIN SCH MG: 100 CAPSULE ORAL at 08:18

## 2023-08-05 RX ADMIN — ATORVASTATIN CALCIUM SCH MG: 10 TABLET, FILM COATED ORAL at 21:08

## 2023-08-05 RX ADMIN — THERA TABS SCH UDTAB: TAB at 08:19

## 2023-08-05 RX ADMIN — Medication SCH EA: at 08:18

## 2023-08-05 RX ADMIN — Medication SCH EACH: at 16:36

## 2023-08-05 RX ADMIN — DIGOXIN SCH MCG: 0.25 INJECTION INTRAMUSCULAR; INTRAVENOUS at 14:16

## 2023-08-05 RX ADMIN — DIGOXIN SCH MCG: 0.25 INJECTION INTRAMUSCULAR; INTRAVENOUS at 21:10

## 2023-08-05 RX ADMIN — Medication SCH ML: at 17:20

## 2023-08-05 RX ADMIN — Medication SCH MG: at 08:19

## 2023-08-05 RX ADMIN — ALLOPURINOL SCH MG: 100 TABLET ORAL at 08:19

## 2023-08-05 RX ADMIN — Medication SCH EACH: at 21:20

## 2023-08-05 RX ADMIN — Medication SCH EA: at 17:19

## 2023-08-05 RX ADMIN — SODIUM CHLORIDE PRN UNIT: 9 INJECTION, SOLUTION INTRAVENOUS at 21:33

## 2023-08-05 RX ADMIN — LEVOTHYROXINE SODIUM SCH MCG: 25 TABLET ORAL at 08:18

## 2023-08-05 RX ADMIN — FERROUS SULFATE TAB 325 MG (65 MG ELEMENTAL FE) SCH MG: 325 (65 FE) TAB at 17:19

## 2023-08-05 RX ADMIN — GABAPENTIN SCH MG: 100 CAPSULE ORAL at 13:41

## 2023-08-05 RX ADMIN — GABAPENTIN SCH MG: 100 CAPSULE ORAL at 17:19

## 2023-08-05 RX ADMIN — Medication SCH EACH: at 07:05

## 2023-08-06 VITALS — DIASTOLIC BLOOD PRESSURE: 53 MMHG | TEMPERATURE: 97.7 F | OXYGEN SATURATION: 91 % | SYSTOLIC BLOOD PRESSURE: 105 MMHG

## 2023-08-06 VITALS — DIASTOLIC BLOOD PRESSURE: 89 MMHG | SYSTOLIC BLOOD PRESSURE: 126 MMHG | OXYGEN SATURATION: 94 % | TEMPERATURE: 98 F

## 2023-08-06 VITALS — DIASTOLIC BLOOD PRESSURE: 83 MMHG | TEMPERATURE: 97.7 F | SYSTOLIC BLOOD PRESSURE: 133 MMHG | OXYGEN SATURATION: 94 %

## 2023-08-06 VITALS — OXYGEN SATURATION: 95 % | DIASTOLIC BLOOD PRESSURE: 64 MMHG | TEMPERATURE: 97.5 F | SYSTOLIC BLOOD PRESSURE: 134 MMHG

## 2023-08-06 VITALS — SYSTOLIC BLOOD PRESSURE: 116 MMHG | TEMPERATURE: 97.8 F | DIASTOLIC BLOOD PRESSURE: 64 MMHG | OXYGEN SATURATION: 96 %

## 2023-08-06 LAB
BASOPHILS # BLD AUTO: 0 K/UL (ref 0–0.2)
BASOPHILS NFR BLD AUTO: 0.1 % (ref 0–2)
BUN SERPL-MCNC: 32 MG/DL (ref 7–18)
CALCIUM SERPL-MCNC: 9.1 MG/DL (ref 8.5–10.1)
CHLORIDE SERPL-SCNC: 105 MMOL/L (ref 98–107)
CO2 SERPL-SCNC: 29 MMOL/L (ref 21–32)
CREAT SERPL-MCNC: 1.3 MG/DL (ref 0.6–1.3)
EOSINOPHIL # BLD AUTO: 0 K/UL (ref 0–0.7)
EOSINOPHIL NFR BLD AUTO: 0.1 % (ref 0–7)
ERYTHROCYTE [DISTWIDTH] IN BLOOD BY AUTOMATED COUNT: 19.1 % (ref 12.3–17.7)
GLUCOSE SERPL-MCNC: 173 MG/DL (ref 74–106)
HCT VFR BLD AUTO: 38.7 % (ref 31.2–41.9)
HGB BLD-MCNC: 12 G/DL (ref 10.9–14.3)
LYMPHOCYTES # BLD AUTO: 0.6 K/UL (ref 0.8–4.8)
LYMPHOCYTES NFR BLD AUTO: 4.6 % (ref 20.5–51.5)
MANUAL DIF COMMENT BLD-IMP: 1
MCH RBC QN AUTO: 28.8 UUG (ref 24.7–32.8)
MCHC RBC AUTO-ENTMCNC: 31 G/DL (ref 32.3–35.6)
MCV RBC AUTO: 93.3 FL (ref 75.5–95.3)
MONOCYTES # BLD AUTO: 0.2 K/UL (ref 0.1–1.3)
MONOCYTES NFR BLD AUTO: 1.6 % (ref 0–11)
NEUTROPHILS # BLD AUTO: 13 K/UL (ref 1.8–8.9)
NEUTROPHILS NFR BLD AUTO: 93.6 % (ref 38.5–71.5)
PLATELET # BLD AUTO: 402 K/UL (ref 179–408)
POTASSIUM SERPL-SCNC: 4.2 MMOL/L (ref 3.5–5.1)
RBC # BLD AUTO: 4.15 MIL/UL (ref 3.63–4.92)
SODIUM SERPL-SCNC: 142 MMOL/L (ref 136–145)
WBC # BLD AUTO: 13.9 K/UL (ref 3.8–11.8)

## 2023-08-06 RX ADMIN — CLONIDINE HYDROCHLORIDE SCH MG: 0.1 TABLET ORAL at 20:56

## 2023-08-06 RX ADMIN — SODIUM CHLORIDE PRN UNIT: 9 INJECTION, SOLUTION INTRAVENOUS at 17:07

## 2023-08-06 RX ADMIN — APIXABAN SCH MG: 5 TABLET, FILM COATED ORAL at 18:09

## 2023-08-06 RX ADMIN — GABAPENTIN SCH MG: 100 CAPSULE ORAL at 13:26

## 2023-08-06 RX ADMIN — Medication SCH EACH: at 11:30

## 2023-08-06 RX ADMIN — Medication SCH ML: at 18:08

## 2023-08-06 RX ADMIN — Medication SCH ML: at 09:17

## 2023-08-06 RX ADMIN — Medication SCH EACH: at 07:37

## 2023-08-06 RX ADMIN — Medication SCH EA: at 11:14

## 2023-08-06 RX ADMIN — VENLAFAXINE HYDROCHLORIDE SCH MG: 150 CAPSULE, EXTENDED RELEASE ORAL at 09:14

## 2023-08-06 RX ADMIN — SODIUM CHLORIDE PRN UNIT: 9 INJECTION, SOLUTION INTRAVENOUS at 22:00

## 2023-08-06 RX ADMIN — APIXABAN SCH MG: 5 TABLET, FILM COATED ORAL at 09:18

## 2023-08-06 RX ADMIN — ATORVASTATIN CALCIUM SCH MG: 10 TABLET, FILM COATED ORAL at 20:56

## 2023-08-06 RX ADMIN — Medication SCH MG: at 09:09

## 2023-08-06 RX ADMIN — THERA TABS SCH UDTAB: TAB at 09:09

## 2023-08-06 RX ADMIN — ALLOPURINOL SCH MG: 100 TABLET ORAL at 09:09

## 2023-08-06 RX ADMIN — CLONIDINE HYDROCHLORIDE SCH MG: 0.1 TABLET ORAL at 09:21

## 2023-08-06 RX ADMIN — GABAPENTIN SCH MG: 100 CAPSULE ORAL at 09:09

## 2023-08-06 RX ADMIN — LEVOTHYROXINE SODIUM SCH MCG: 25 TABLET ORAL at 06:10

## 2023-08-06 RX ADMIN — GABAPENTIN SCH MG: 100 CAPSULE ORAL at 18:07

## 2023-08-06 RX ADMIN — Medication SCH EA: at 15:20

## 2023-08-06 RX ADMIN — FERROUS SULFATE TAB 325 MG (65 MG ELEMENTAL FE) SCH MG: 325 (65 FE) TAB at 18:07

## 2023-08-06 RX ADMIN — ARIPIPRAZOLE SCH MG: 5 TABLET ORAL at 09:09

## 2023-08-06 RX ADMIN — Medication SCH EA: at 18:12

## 2023-08-06 RX ADMIN — Medication SCH EACH: at 21:58

## 2023-08-06 RX ADMIN — FERROUS SULFATE TAB 325 MG (65 MG ELEMENTAL FE) SCH MG: 325 (65 FE) TAB at 09:10

## 2023-08-06 RX ADMIN — DILTIAZEM HYDROCHLORIDE SCH MG: 240 CAPSULE, EXTENDED RELEASE ORAL at 09:20

## 2023-08-06 RX ADMIN — SODIUM CHLORIDE PRN UNIT: 9 INJECTION, SOLUTION INTRAVENOUS at 12:45

## 2023-08-06 RX ADMIN — Medication SCH EACH: at 17:04

## 2023-08-07 ENCOUNTER — HOSPITAL ENCOUNTER (INPATIENT)
Dept: HOSPITAL 12 - REHABOV3 | Age: 78
LOS: 8 days | Discharge: HOME HEALTH | DRG: 947 | End: 2023-08-15
Attending: PHYSICAL MEDICINE & REHABILITATION | Admitting: PHYSICAL MEDICINE & REHABILITATION
Payer: MEDICARE

## 2023-08-07 VITALS — DIASTOLIC BLOOD PRESSURE: 74 MMHG | OXYGEN SATURATION: 93 % | SYSTOLIC BLOOD PRESSURE: 139 MMHG | TEMPERATURE: 98.6 F

## 2023-08-07 VITALS — OXYGEN SATURATION: 93 % | DIASTOLIC BLOOD PRESSURE: 47 MMHG | TEMPERATURE: 97.6 F | SYSTOLIC BLOOD PRESSURE: 117 MMHG

## 2023-08-07 VITALS — TEMPERATURE: 97.8 F

## 2023-08-07 VITALS — OXYGEN SATURATION: 95 %

## 2023-08-07 VITALS — OXYGEN SATURATION: 95 % | TEMPERATURE: 98.4 F | SYSTOLIC BLOOD PRESSURE: 121 MMHG | DIASTOLIC BLOOD PRESSURE: 77 MMHG

## 2023-08-07 VITALS — TEMPERATURE: 98.2 F | DIASTOLIC BLOOD PRESSURE: 62 MMHG | OXYGEN SATURATION: 97 % | SYSTOLIC BLOOD PRESSURE: 109 MMHG

## 2023-08-07 VITALS — HEIGHT: 61 IN | BODY MASS INDEX: 47.39 KG/M2 | WEIGHT: 251 LBS

## 2023-08-07 VITALS — TEMPERATURE: 98.4 F | SYSTOLIC BLOOD PRESSURE: 117 MMHG | OXYGEN SATURATION: 95 % | DIASTOLIC BLOOD PRESSURE: 91 MMHG

## 2023-08-07 VITALS — OXYGEN SATURATION: 93 %

## 2023-08-07 VITALS — TEMPERATURE: 98.5 F

## 2023-08-07 DIAGNOSIS — M62.81: ICD-10-CM

## 2023-08-07 DIAGNOSIS — E11.22: ICD-10-CM

## 2023-08-07 DIAGNOSIS — E66.01: ICD-10-CM

## 2023-08-07 DIAGNOSIS — D68.59: ICD-10-CM

## 2023-08-07 DIAGNOSIS — Z79.01: ICD-10-CM
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
## 2023-08-07 DIAGNOSIS — S80.812A: ICD-10-CM

## 2023-08-07 DIAGNOSIS — L60.3: ICD-10-CM

## 2023-08-07 DIAGNOSIS — Z79.4: ICD-10-CM

## 2023-08-07 DIAGNOSIS — E78.5: ICD-10-CM

## 2023-08-07 DIAGNOSIS — E86.9: ICD-10-CM

## 2023-08-07 DIAGNOSIS — I48.20: ICD-10-CM

## 2023-08-07 DIAGNOSIS — F41.1: ICD-10-CM

## 2023-08-07 DIAGNOSIS — F03.94: ICD-10-CM

## 2023-08-07 DIAGNOSIS — B96.89: ICD-10-CM

## 2023-08-07 DIAGNOSIS — N18.9: ICD-10-CM

## 2023-08-07 DIAGNOSIS — Z91.81: ICD-10-CM

## 2023-08-07 DIAGNOSIS — N17.0: ICD-10-CM

## 2023-08-07 DIAGNOSIS — Z90.49: ICD-10-CM

## 2023-08-07 DIAGNOSIS — E46: ICD-10-CM
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
## 2023-08-07 DIAGNOSIS — F11.20: ICD-10-CM

## 2023-08-07 DIAGNOSIS — R53.1: Primary | ICD-10-CM
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
## 2023-08-07 DIAGNOSIS — J96.01: ICD-10-CM

## 2023-08-07 DIAGNOSIS — J44.9: ICD-10-CM

## 2023-08-07 DIAGNOSIS — Z98.1: ICD-10-CM

## 2023-08-07 DIAGNOSIS — E11.40: ICD-10-CM

## 2023-08-07 DIAGNOSIS — I87.8: ICD-10-CM

## 2023-08-07 DIAGNOSIS — G89.4: ICD-10-CM

## 2023-08-07 DIAGNOSIS — I50.33: ICD-10-CM

## 2023-08-07 DIAGNOSIS — F03.93: ICD-10-CM

## 2023-08-07 DIAGNOSIS — Z90.710: ICD-10-CM

## 2023-08-07 DIAGNOSIS — N39.0: ICD-10-CM

## 2023-08-07 DIAGNOSIS — I27.20: ICD-10-CM

## 2023-08-07 DIAGNOSIS — S80.811A: ICD-10-CM

## 2023-08-07 DIAGNOSIS — E03.9: ICD-10-CM

## 2023-08-07 DIAGNOSIS — I13.0: ICD-10-CM

## 2023-08-07 DIAGNOSIS — F31.9: ICD-10-CM

## 2023-08-07 DIAGNOSIS — R45.851: ICD-10-CM

## 2023-08-07 DIAGNOSIS — M81.0: ICD-10-CM

## 2023-08-07 DIAGNOSIS — F33.2: ICD-10-CM

## 2023-08-07 DIAGNOSIS — F03.911: ICD-10-CM

## 2023-08-07 DIAGNOSIS — M89.8X9: ICD-10-CM

## 2023-08-07 LAB
BASOPHILS # BLD AUTO: 0 K/UL (ref 0–0.2)
BASOPHILS NFR BLD AUTO: 0 % (ref 0–2)
BUN SERPL-MCNC: 44 MG/DL (ref 7–18)
CALCIUM SERPL-MCNC: 8.8 MG/DL (ref 8.5–10.1)
CHLORIDE SERPL-SCNC: 104 MMOL/L (ref 98–107)
CO2 SERPL-SCNC: 31 MMOL/L (ref 21–32)
CREAT SERPL-MCNC: 1.6 MG/DL (ref 0.6–1.3)
EOSINOPHIL # BLD AUTO: 0 K/UL (ref 0–0.7)
EOSINOPHIL NFR BLD AUTO: 0 % (ref 0–7)
ERYTHROCYTE [DISTWIDTH] IN BLOOD BY AUTOMATED COUNT: 18.2 % (ref 12.3–17.7)
GLUCOSE SERPL-MCNC: 148 MG/DL (ref 74–106)
HCT VFR BLD AUTO: 37.4 % (ref 31.2–41.9)
HGB BLD-MCNC: 11.5 G/DL (ref 10.9–14.3)
LYMPHOCYTES # BLD AUTO: 1.1 K/UL (ref 0.8–4.8)
LYMPHOCYTES NFR BLD AUTO: 7.5 % (ref 20.5–51.5)
MANUAL DIF COMMENT BLD-IMP: 1
MCH RBC QN AUTO: 28.8 UUG (ref 24.7–32.8)
MCHC RBC AUTO-ENTMCNC: 31 G/DL (ref 32.3–35.6)
MCV RBC AUTO: 93.5 FL (ref 75.5–95.3)
MONOCYTES # BLD AUTO: 0.9 K/UL (ref 0.1–1.3)
MONOCYTES NFR BLD AUTO: 5.9 % (ref 0–11)
NEUTROPHILS # BLD AUTO: 12.6 K/UL (ref 1.8–8.9)
NEUTROPHILS NFR BLD AUTO: 86.6 % (ref 38.5–71.5)
PLATELET # BLD AUTO: 410 K/UL (ref 179–408)
POTASSIUM SERPL-SCNC: 4.5 MMOL/L (ref 3.5–5.1)
RBC # BLD AUTO: 4 MIL/UL (ref 3.63–4.92)
SODIUM SERPL-SCNC: 140 MMOL/L (ref 136–145)
WBC # BLD AUTO: 14.5 K/UL (ref 3.8–11.8)

## 2023-08-07 PROCEDURE — A6209 FOAM DRSG <=16 SQ IN W/O BDR: HCPCS

## 2023-08-07 PROCEDURE — A4663 DIALYSIS BLOOD PRESSURE CUFF: HCPCS

## 2023-08-07 RX ADMIN — GABAPENTIN SCH MG: 100 CAPSULE ORAL at 13:35

## 2023-08-07 RX ADMIN — Medication SCH EA: at 10:23

## 2023-08-07 RX ADMIN — Medication SCH EA: at 13:00

## 2023-08-07 RX ADMIN — CLONIDINE HYDROCHLORIDE SCH MG: 0.1 TABLET ORAL at 10:26

## 2023-08-07 RX ADMIN — APIXABAN SCH MG: 5 TABLET, FILM COATED ORAL at 10:24

## 2023-08-07 RX ADMIN — VENLAFAXINE HYDROCHLORIDE SCH MG: 150 CAPSULE, EXTENDED RELEASE ORAL at 10:21

## 2023-08-07 RX ADMIN — Medication SCH EA: at 09:00

## 2023-08-07 RX ADMIN — SODIUM CHLORIDE PRN UNIT: 9 INJECTION, SOLUTION INTRAVENOUS at 17:45

## 2023-08-07 RX ADMIN — Medication SCH ML: at 10:24

## 2023-08-07 RX ADMIN — Medication SCH EACH: at 17:42

## 2023-08-07 RX ADMIN — ALLOPURINOL SCH MG: 100 TABLET ORAL at 10:22

## 2023-08-07 RX ADMIN — THERA TABS SCH UDTAB: TAB at 10:22

## 2023-08-07 RX ADMIN — LEVOTHYROXINE SODIUM SCH MCG: 25 TABLET ORAL at 06:05

## 2023-08-07 RX ADMIN — SODIUM CHLORIDE PRN UNIT: 9 INJECTION, SOLUTION INTRAVENOUS at 06:22

## 2023-08-07 RX ADMIN — Medication SCH EACH: at 06:21

## 2023-08-07 RX ADMIN — GABAPENTIN SCH MG: 100 CAPSULE ORAL at 17:43

## 2023-08-07 RX ADMIN — FERROUS SULFATE TAB 325 MG (65 MG ELEMENTAL FE) SCH MG: 325 (65 FE) TAB at 17:43

## 2023-08-07 RX ADMIN — SODIUM CHLORIDE PRN UNIT: 9 INJECTION, SOLUTION INTRAVENOUS at 12:02

## 2023-08-07 RX ADMIN — Medication SCH EA: at 18:33

## 2023-08-07 RX ADMIN — GABAPENTIN SCH MG: 100 CAPSULE ORAL at 10:22

## 2023-08-07 RX ADMIN — APIXABAN SCH MG: 5 TABLET, FILM COATED ORAL at 17:42

## 2023-08-07 RX ADMIN — Medication SCH EACH: at 12:02

## 2023-08-07 RX ADMIN — FERROUS SULFATE TAB 325 MG (65 MG ELEMENTAL FE) SCH MG: 325 (65 FE) TAB at 10:20

## 2023-08-07 RX ADMIN — ARIPIPRAZOLE SCH MG: 5 TABLET ORAL at 10:19

## 2023-08-07 RX ADMIN — Medication SCH MG: at 10:21

## 2023-08-07 RX ADMIN — Medication SCH EA: at 13:35

## 2023-08-07 RX ADMIN — Medication SCH ML: at 17:00

## 2023-08-07 RX ADMIN — DILTIAZEM HYDROCHLORIDE SCH MG: 240 CAPSULE, EXTENDED RELEASE ORAL at 10:19

## 2023-08-08 VITALS — SYSTOLIC BLOOD PRESSURE: 117 MMHG | DIASTOLIC BLOOD PRESSURE: 72 MMHG | OXYGEN SATURATION: 96 % | TEMPERATURE: 98 F

## 2023-08-08 VITALS — SYSTOLIC BLOOD PRESSURE: 105 MMHG | OXYGEN SATURATION: 95 % | TEMPERATURE: 97.9 F | DIASTOLIC BLOOD PRESSURE: 52 MMHG

## 2023-08-08 VITALS — DIASTOLIC BLOOD PRESSURE: 64 MMHG | OXYGEN SATURATION: 95 % | SYSTOLIC BLOOD PRESSURE: 112 MMHG | TEMPERATURE: 98.5 F

## 2023-08-08 LAB
APPEARANCE UR: (no result)
BILIRUB UR QL STRIP: (no result)
COLOR UR: (no result)
GLUCOSE UR STRIP-MCNC: (no result) MG/DL
HGB UR QL STRIP: (no result)
KETONES UR STRIP-MCNC: (no result) MG/DL
LEUKOCYTE ESTERASE UR QL STRIP: (no result)
NITRITE UR QL STRIP: POSITIVE
PH UR STRIP: 5.5 [PH] (ref 5–8)
PROT UR QL STRIP: (no result)
RBC #/AREA URNS HPF: (no result) /HPF (ref 0–3)
SP GR UR STRIP: 1.02 (ref 1–1.03)
UROBILINOGEN UR STRIP-MCNC: >=8 E.U./DL
WBC #/AREA URNS HPF: (no result) /HPF
WBC #/AREA URNS HPF: (no result) /HPF (ref 0–3)

## 2023-08-08 RX ADMIN — Medication SCH EA: at 16:55

## 2023-08-08 RX ADMIN — ARIPIPRAZOLE SCH MG: 5 TABLET ORAL at 08:50

## 2023-08-08 RX ADMIN — FERROUS SULFATE TAB 325 MG (65 MG ELEMENTAL FE) SCH MG: 325 (65 FE) TAB at 16:55

## 2023-08-08 RX ADMIN — ACETAMINOPHEN PRN MG: 325 TABLET ORAL at 06:48

## 2023-08-08 RX ADMIN — VENLAFAXINE HYDROCHLORIDE SCH MG: 150 CAPSULE, EXTENDED RELEASE ORAL at 08:59

## 2023-08-08 RX ADMIN — DILTIAZEM HYDROCHLORIDE SCH MG: 240 CAPSULE, EXTENDED RELEASE ORAL at 08:58

## 2023-08-08 RX ADMIN — GABAPENTIN SCH MG: 300 CAPSULE ORAL at 16:55

## 2023-08-08 RX ADMIN — DEXTROSE SCH MLS/HR: 50 INJECTION, SOLUTION INTRAVENOUS at 18:03

## 2023-08-08 RX ADMIN — ALLOPURINOL SCH MG: 100 TABLET ORAL at 08:50

## 2023-08-08 RX ADMIN — GABAPENTIN SCH MG: 300 CAPSULE ORAL at 12:37

## 2023-08-08 RX ADMIN — Medication SCH MG: at 08:50

## 2023-08-08 RX ADMIN — APIXABAN SCH MG: 5 TABLET, FILM COATED ORAL at 08:50

## 2023-08-08 RX ADMIN — ATORVASTATIN CALCIUM SCH MG: 10 TABLET, FILM COATED ORAL at 21:07

## 2023-08-08 RX ADMIN — APIXABAN SCH MG: 5 TABLET, FILM COATED ORAL at 16:56

## 2023-08-08 RX ADMIN — Medication SCH EA: at 12:38

## 2023-08-08 RX ADMIN — CLONIDINE HYDROCHLORIDE SCH MG: 0.1 TABLET ORAL at 21:08

## 2023-08-08 RX ADMIN — Medication SCH EA: at 08:49

## 2023-08-08 RX ADMIN — GABAPENTIN SCH MG: 300 CAPSULE ORAL at 08:50

## 2023-08-08 RX ADMIN — THERA TABS SCH UDTAB: TAB at 08:58

## 2023-08-08 RX ADMIN — CLONIDINE HYDROCHLORIDE SCH MG: 0.1 TABLET ORAL at 08:58

## 2023-08-08 RX ADMIN — FERROUS SULFATE TAB 325 MG (65 MG ELEMENTAL FE) SCH MG: 325 (65 FE) TAB at 08:50

## 2023-08-08 RX ADMIN — LEVOTHYROXINE SODIUM SCH MCG: 25 TABLET ORAL at 06:48

## 2023-08-09 VITALS — OXYGEN SATURATION: 94 % | TEMPERATURE: 97.4 F | SYSTOLIC BLOOD PRESSURE: 132 MMHG | DIASTOLIC BLOOD PRESSURE: 63 MMHG

## 2023-08-09 VITALS — OXYGEN SATURATION: 98 %

## 2023-08-09 VITALS — SYSTOLIC BLOOD PRESSURE: 108 MMHG | DIASTOLIC BLOOD PRESSURE: 66 MMHG | TEMPERATURE: 98.3 F | OXYGEN SATURATION: 95 %

## 2023-08-09 VITALS — SYSTOLIC BLOOD PRESSURE: 97 MMHG | DIASTOLIC BLOOD PRESSURE: 54 MMHG | OXYGEN SATURATION: 95 % | TEMPERATURE: 98.3 F

## 2023-08-09 VITALS — DIASTOLIC BLOOD PRESSURE: 75 MMHG | OXYGEN SATURATION: 96 % | SYSTOLIC BLOOD PRESSURE: 127 MMHG | TEMPERATURE: 98.6 F

## 2023-08-09 VITALS — OXYGEN SATURATION: 97 %

## 2023-08-09 LAB
BASOPHILS # BLD AUTO: 0.1 K/UL (ref 0–0.2)
BASOPHILS NFR BLD AUTO: 0.7 % (ref 0–2)
BUN SERPL-MCNC: 39 MG/DL (ref 7–18)
CALCIUM SERPL-MCNC: 8.7 MG/DL (ref 8.5–10.1)
CHLORIDE SERPL-SCNC: 104 MMOL/L (ref 98–107)
CO2 SERPL-SCNC: 31 MMOL/L (ref 21–32)
CREAT SERPL-MCNC: 1.4 MG/DL (ref 0.6–1.3)
EOSINOPHIL # BLD AUTO: 0.1 K/UL (ref 0–0.7)
EOSINOPHIL NFR BLD AUTO: 1 % (ref 0–7)
ERYTHROCYTE [DISTWIDTH] IN BLOOD BY AUTOMATED COUNT: 19.2 % (ref 12.3–17.7)
GLUCOSE SERPL-MCNC: 129 MG/DL (ref 74–106)
HCT VFR BLD AUTO: 37.9 % (ref 31.2–41.9)
HGB BLD-MCNC: 11.8 G/DL (ref 10.9–14.3)
LYMPHOCYTES # BLD AUTO: 2.6 K/UL (ref 0.8–4.8)
LYMPHOCYTES NFR BLD AUTO: 18.7 % (ref 20.5–51.5)
MAGNESIUM SERPL-MCNC: 2.4 MG/DL (ref 1.8–2.4)
MANUAL DIF COMMENT BLD-IMP: 1
MCH RBC QN AUTO: 28.9 UUG (ref 24.7–32.8)
MCHC RBC AUTO-ENTMCNC: 31 G/DL (ref 32.3–35.6)
MCV RBC AUTO: 92.9 FL (ref 75.5–95.3)
MONOCYTES # BLD AUTO: 1.1 K/UL (ref 0.1–1.3)
MONOCYTES NFR BLD AUTO: 8.2 % (ref 0–11)
NEUTROPHILS # BLD AUTO: 9.7 K/UL (ref 1.8–8.9)
NEUTROPHILS NFR BLD AUTO: 71.4 % (ref 38.5–71.5)
PHOSPHATE SERPL-MCNC: 3.3 MG/DL (ref 2.5–4.9)
PLATELET # BLD AUTO: 393 K/UL (ref 179–408)
POTASSIUM SERPL-SCNC: 4.1 MMOL/L (ref 3.5–5.1)
RBC # BLD AUTO: 4.08 MIL/UL (ref 3.63–4.92)
SODIUM SERPL-SCNC: 140 MMOL/L (ref 136–145)
WBC # BLD AUTO: 13.6 K/UL (ref 3.8–11.8)

## 2023-08-09 PROCEDURE — 05HF33Z INSERTION OF INFUSION DEVICE INTO LEFT CEPHALIC VEIN, PERCUTANEOUS APPROACH: ICD-10-PCS

## 2023-08-09 RX ADMIN — ALLOPURINOL SCH MG: 100 TABLET ORAL at 08:38

## 2023-08-09 RX ADMIN — GABAPENTIN SCH MG: 300 CAPSULE ORAL at 16:14

## 2023-08-09 RX ADMIN — Medication SCH EA: at 08:41

## 2023-08-09 RX ADMIN — GABAPENTIN SCH MG: 300 CAPSULE ORAL at 08:37

## 2023-08-09 RX ADMIN — ATORVASTATIN CALCIUM SCH MG: 10 TABLET, FILM COATED ORAL at 21:11

## 2023-08-09 RX ADMIN — DEXTROSE SCH MLS/HR: 50 INJECTION, SOLUTION INTRAVENOUS at 17:32

## 2023-08-09 RX ADMIN — Medication SCH EA: at 14:04

## 2023-08-09 RX ADMIN — FERROUS SULFATE TAB 325 MG (65 MG ELEMENTAL FE) SCH MG: 325 (65 FE) TAB at 16:13

## 2023-08-09 RX ADMIN — DILTIAZEM HYDROCHLORIDE SCH MG: 240 CAPSULE, EXTENDED RELEASE ORAL at 08:37

## 2023-08-09 RX ADMIN — CLONIDINE HYDROCHLORIDE SCH MG: 0.1 TABLET ORAL at 08:38

## 2023-08-09 RX ADMIN — ARIPIPRAZOLE SCH MG: 5 TABLET ORAL at 08:37

## 2023-08-09 RX ADMIN — CHLORDIAZEPOXIDE HYDROCHLORIDE PRN MG: 25 CAPSULE ORAL at 16:13

## 2023-08-09 RX ADMIN — CLONIDINE HYDROCHLORIDE SCH MG: 0.1 TABLET ORAL at 21:12

## 2023-08-09 RX ADMIN — APIXABAN SCH MG: 5 TABLET, FILM COATED ORAL at 08:48

## 2023-08-09 RX ADMIN — APIXABAN SCH MG: 5 TABLET, FILM COATED ORAL at 16:15

## 2023-08-09 RX ADMIN — GABAPENTIN SCH MG: 300 CAPSULE ORAL at 14:03

## 2023-08-09 RX ADMIN — LEVOTHYROXINE SODIUM SCH MCG: 25 TABLET ORAL at 06:35

## 2023-08-09 RX ADMIN — Medication SCH EA: at 16:15

## 2023-08-09 RX ADMIN — Medication SCH MG: at 08:37

## 2023-08-09 RX ADMIN — CHLORDIAZEPOXIDE HYDROCHLORIDE PRN MG: 25 CAPSULE ORAL at 11:08

## 2023-08-09 RX ADMIN — THERA TABS SCH UDTAB: TAB at 08:38

## 2023-08-09 RX ADMIN — DOXEPIN HYDROCHLORIDE PRN MG: 25 CAPSULE ORAL at 21:13

## 2023-08-09 RX ADMIN — FERROUS SULFATE TAB 325 MG (65 MG ELEMENTAL FE) SCH MG: 325 (65 FE) TAB at 08:38

## 2023-08-09 RX ADMIN — VENLAFAXINE HYDROCHLORIDE SCH MG: 150 CAPSULE, EXTENDED RELEASE ORAL at 08:38

## 2023-08-09 RX ADMIN — DOXEPIN HYDROCHLORIDE PRN MG: 25 CAPSULE ORAL at 00:19

## 2023-08-10 VITALS — TEMPERATURE: 98 F | DIASTOLIC BLOOD PRESSURE: 64 MMHG | OXYGEN SATURATION: 93 % | SYSTOLIC BLOOD PRESSURE: 109 MMHG

## 2023-08-10 VITALS — TEMPERATURE: 97.9 F | DIASTOLIC BLOOD PRESSURE: 65 MMHG | OXYGEN SATURATION: 97 % | SYSTOLIC BLOOD PRESSURE: 107 MMHG

## 2023-08-10 VITALS — TEMPERATURE: 98.1 F | OXYGEN SATURATION: 96 % | SYSTOLIC BLOOD PRESSURE: 112 MMHG | DIASTOLIC BLOOD PRESSURE: 59 MMHG

## 2023-08-10 VITALS — SYSTOLIC BLOOD PRESSURE: 117 MMHG | DIASTOLIC BLOOD PRESSURE: 82 MMHG | TEMPERATURE: 98.7 F | OXYGEN SATURATION: 97 %

## 2023-08-10 RX ADMIN — CHLORDIAZEPOXIDE HYDROCHLORIDE PRN MG: 25 CAPSULE ORAL at 11:49

## 2023-08-10 RX ADMIN — FERROUS SULFATE TAB 325 MG (65 MG ELEMENTAL FE) SCH MG: 325 (65 FE) TAB at 08:22

## 2023-08-10 RX ADMIN — ALLOPURINOL SCH MG: 100 TABLET ORAL at 08:22

## 2023-08-10 RX ADMIN — GABAPENTIN SCH MG: 300 CAPSULE ORAL at 16:23

## 2023-08-10 RX ADMIN — FERROUS SULFATE TAB 325 MG (65 MG ELEMENTAL FE) SCH MG: 325 (65 FE) TAB at 16:21

## 2023-08-10 RX ADMIN — VENLAFAXINE HYDROCHLORIDE SCH MG: 150 CAPSULE, EXTENDED RELEASE ORAL at 08:23

## 2023-08-10 RX ADMIN — GABAPENTIN SCH MG: 300 CAPSULE ORAL at 14:09

## 2023-08-10 RX ADMIN — Medication SCH EA: at 16:39

## 2023-08-10 RX ADMIN — GABAPENTIN SCH MG: 300 CAPSULE ORAL at 08:22

## 2023-08-10 RX ADMIN — DILTIAZEM HYDROCHLORIDE SCH MG: 240 CAPSULE, EXTENDED RELEASE ORAL at 08:22

## 2023-08-10 RX ADMIN — LEVOTHYROXINE SODIUM SCH MCG: 25 TABLET ORAL at 06:03

## 2023-08-10 RX ADMIN — Medication SCH EA: at 09:00

## 2023-08-10 RX ADMIN — DEXTROSE SCH MLS/HR: 50 INJECTION, SOLUTION INTRAVENOUS at 17:09

## 2023-08-10 RX ADMIN — CLONIDINE HYDROCHLORIDE SCH MG: 0.1 TABLET ORAL at 08:23

## 2023-08-10 RX ADMIN — ARIPIPRAZOLE SCH MG: 5 TABLET ORAL at 08:22

## 2023-08-10 RX ADMIN — APIXABAN SCH MG: 5 TABLET, FILM COATED ORAL at 09:02

## 2023-08-10 RX ADMIN — ATORVASTATIN CALCIUM SCH MG: 10 TABLET, FILM COATED ORAL at 21:32

## 2023-08-10 RX ADMIN — Medication SCH MG: at 08:22

## 2023-08-10 RX ADMIN — Medication SCH EA: at 14:08

## 2023-08-10 RX ADMIN — APIXABAN SCH MG: 5 TABLET, FILM COATED ORAL at 16:24

## 2023-08-10 RX ADMIN — CLONIDINE HYDROCHLORIDE SCH MG: 0.1 TABLET ORAL at 21:32

## 2023-08-10 RX ADMIN — THERA TABS SCH UDTAB: TAB at 08:22

## 2023-08-10 RX ADMIN — Medication SCH EA: at 09:09

## 2023-08-11 VITALS — TEMPERATURE: 97.8 F | DIASTOLIC BLOOD PRESSURE: 59 MMHG | OXYGEN SATURATION: 94 % | SYSTOLIC BLOOD PRESSURE: 116 MMHG

## 2023-08-11 VITALS — DIASTOLIC BLOOD PRESSURE: 62 MMHG | SYSTOLIC BLOOD PRESSURE: 108 MMHG | OXYGEN SATURATION: 94 % | TEMPERATURE: 98.2 F

## 2023-08-11 VITALS — SYSTOLIC BLOOD PRESSURE: 111 MMHG | TEMPERATURE: 97.9 F | DIASTOLIC BLOOD PRESSURE: 66 MMHG | OXYGEN SATURATION: 96 %

## 2023-08-11 LAB
BASOPHILS # BLD AUTO: 0 K/UL (ref 0–0.2)
BASOPHILS NFR BLD AUTO: 0.1 % (ref 0–2)
BUN SERPL-MCNC: 30 MG/DL (ref 7–18)
CALCIUM SERPL-MCNC: 8.2 MG/DL (ref 8.5–10.1)
CHLORIDE SERPL-SCNC: 104 MMOL/L (ref 98–107)
CO2 SERPL-SCNC: 29 MMOL/L (ref 21–32)
CREAT SERPL-MCNC: 1.3 MG/DL (ref 0.6–1.3)
EOSINOPHIL # BLD AUTO: 0.1 K/UL (ref 0–0.7)
EOSINOPHIL NFR BLD AUTO: 1 % (ref 0–7)
ERYTHROCYTE [DISTWIDTH] IN BLOOD BY AUTOMATED COUNT: 19 % (ref 12.3–17.7)
GLUCOSE SERPL-MCNC: 103 MG/DL (ref 74–106)
HCT VFR BLD AUTO: 36.5 % (ref 31.2–41.9)
HGB BLD-MCNC: 11.6 G/DL (ref 10.9–14.3)
LYMPHOCYTES # BLD AUTO: 2.3 K/UL (ref 0.8–4.8)
LYMPHOCYTES NFR BLD AUTO: 19.9 % (ref 20.5–51.5)
MAGNESIUM SERPL-MCNC: 2.3 MG/DL (ref 1.8–2.4)
MANUAL DIF COMMENT BLD-IMP: 1
MCH RBC QN AUTO: 29.5 UUG (ref 24.7–32.8)
MCHC RBC AUTO-ENTMCNC: 32 G/DL (ref 32.3–35.6)
MCV RBC AUTO: 92.7 FL (ref 75.5–95.3)
MONOCYTES # BLD AUTO: 0.9 K/UL (ref 0.1–1.3)
MONOCYTES NFR BLD AUTO: 8 % (ref 0–11)
NEUTROPHILS # BLD AUTO: 8.3 K/UL (ref 1.8–8.9)
NEUTROPHILS NFR BLD AUTO: 71 % (ref 38.5–71.5)
PHOSPHATE SERPL-MCNC: 3.7 MG/DL (ref 2.5–4.9)
PLATELET # BLD AUTO: 380 K/UL (ref 179–408)
POTASSIUM SERPL-SCNC: 4.3 MMOL/L (ref 3.5–5.1)
RBC # BLD AUTO: 3.93 MIL/UL (ref 3.63–4.92)
SODIUM SERPL-SCNC: 140 MMOL/L (ref 136–145)
WBC # BLD AUTO: 11.7 K/UL (ref 3.8–11.8)

## 2023-08-11 RX ADMIN — Medication SCH MG: at 22:15

## 2023-08-11 RX ADMIN — ARIPIPRAZOLE SCH MG: 5 TABLET ORAL at 09:54

## 2023-08-11 RX ADMIN — APIXABAN SCH MG: 5 TABLET, FILM COATED ORAL at 10:06

## 2023-08-11 RX ADMIN — ATORVASTATIN CALCIUM SCH MG: 10 TABLET, FILM COATED ORAL at 20:35

## 2023-08-11 RX ADMIN — LEVOTHYROXINE SODIUM SCH MCG: 25 TABLET ORAL at 06:10

## 2023-08-11 RX ADMIN — APIXABAN SCH MG: 5 TABLET, FILM COATED ORAL at 17:34

## 2023-08-11 RX ADMIN — FERROUS SULFATE TAB 325 MG (65 MG ELEMENTAL FE) SCH MG: 325 (65 FE) TAB at 10:04

## 2023-08-11 RX ADMIN — GABAPENTIN SCH MG: 300 CAPSULE ORAL at 09:54

## 2023-08-11 RX ADMIN — VENLAFAXINE HYDROCHLORIDE SCH MG: 150 CAPSULE, EXTENDED RELEASE ORAL at 09:54

## 2023-08-11 RX ADMIN — ALLOPURINOL SCH MG: 100 TABLET ORAL at 09:54

## 2023-08-11 RX ADMIN — CLONIDINE HYDROCHLORIDE SCH MG: 0.1 TABLET ORAL at 10:05

## 2023-08-11 RX ADMIN — DILTIAZEM HYDROCHLORIDE SCH MG: 240 CAPSULE, EXTENDED RELEASE ORAL at 10:04

## 2023-08-11 RX ADMIN — Medication SCH EA: at 09:55

## 2023-08-11 RX ADMIN — Medication SCH MG: at 10:04

## 2023-08-11 RX ADMIN — FERROUS SULFATE TAB 325 MG (65 MG ELEMENTAL FE) SCH MG: 325 (65 FE) TAB at 17:34

## 2023-08-11 RX ADMIN — CHLORDIAZEPOXIDE HYDROCHLORIDE PRN MG: 25 CAPSULE ORAL at 22:40

## 2023-08-11 RX ADMIN — GABAPENTIN SCH MG: 300 CAPSULE ORAL at 14:15

## 2023-08-11 RX ADMIN — Medication SCH EA: at 14:13

## 2023-08-11 RX ADMIN — Medication SCH MG: at 14:13

## 2023-08-11 RX ADMIN — THERA TABS SCH UDTAB: TAB at 10:04

## 2023-08-11 RX ADMIN — CLONIDINE HYDROCHLORIDE SCH MG: 0.1 TABLET ORAL at 20:34

## 2023-08-11 RX ADMIN — GABAPENTIN SCH MG: 300 CAPSULE ORAL at 17:34

## 2023-08-11 RX ADMIN — Medication SCH EA: at 17:34

## 2023-08-12 VITALS — OXYGEN SATURATION: 96 % | TEMPERATURE: 98.5 F | DIASTOLIC BLOOD PRESSURE: 77 MMHG | SYSTOLIC BLOOD PRESSURE: 108 MMHG

## 2023-08-12 VITALS — DIASTOLIC BLOOD PRESSURE: 80 MMHG | OXYGEN SATURATION: 94 % | SYSTOLIC BLOOD PRESSURE: 143 MMHG | TEMPERATURE: 98.5 F

## 2023-08-12 VITALS — SYSTOLIC BLOOD PRESSURE: 125 MMHG | TEMPERATURE: 98.1 F | DIASTOLIC BLOOD PRESSURE: 72 MMHG

## 2023-08-12 VITALS — TEMPERATURE: 98.1 F | SYSTOLIC BLOOD PRESSURE: 125 MMHG | OXYGEN SATURATION: 96 % | DIASTOLIC BLOOD PRESSURE: 80 MMHG

## 2023-08-12 LAB
ALBUMIN SERPL BCG-MCNC: 2.6 G/DL (ref 3.4–5)
ALP SERPL-CCNC: 69 U/L (ref 50–136)
ALT SERPL W/O P-5'-P-CCNC: 22 U/L (ref 14–59)
AST SERPL-CCNC: 10 U/L (ref 15–37)
BASOPHILS # BLD AUTO: 0 K/UL (ref 0–0.2)
BASOPHILS NFR BLD AUTO: 0.2 % (ref 0–2)
BILIRUB SERPL-MCNC: 0.3 MG/DL (ref 0.2–1)
BUN SERPL-MCNC: 27 MG/DL (ref 7–18)
CALCIUM SERPL-MCNC: 8.2 MG/DL (ref 8.5–10.1)
CHLORIDE SERPL-SCNC: 105 MMOL/L (ref 98–107)
CO2 SERPL-SCNC: 32 MMOL/L (ref 21–32)
CREAT SERPL-MCNC: 1.3 MG/DL (ref 0.6–1.3)
EOSINOPHIL # BLD AUTO: 0.1 K/UL (ref 0–0.7)
EOSINOPHIL NFR BLD AUTO: 1.2 % (ref 0–7)
ERYTHROCYTE [DISTWIDTH] IN BLOOD BY AUTOMATED COUNT: 19 % (ref 12.3–17.7)
GLUCOSE SERPL-MCNC: 103 MG/DL (ref 74–106)
HCT VFR BLD AUTO: 36.3 % (ref 31.2–41.9)
HGB BLD-MCNC: 11.3 G/DL (ref 10.9–14.3)
LYMPHOCYTES # BLD AUTO: 2.1 K/UL (ref 0.8–4.8)
LYMPHOCYTES NFR BLD AUTO: 18.4 % (ref 20.5–51.5)
MAGNESIUM SERPL-MCNC: 2.3 MG/DL (ref 1.8–2.4)
MANUAL DIF COMMENT BLD-IMP: 1
MCH RBC QN AUTO: 29.1 UUG (ref 24.7–32.8)
MCHC RBC AUTO-ENTMCNC: 31 G/DL (ref 32.3–35.6)
MCV RBC AUTO: 93.7 FL (ref 75.5–95.3)
MONOCYTES # BLD AUTO: 0.9 K/UL (ref 0.1–1.3)
MONOCYTES NFR BLD AUTO: 8.1 % (ref 0–11)
NEUTROPHILS # BLD AUTO: 8.4 K/UL (ref 1.8–8.9)
NEUTROPHILS NFR BLD AUTO: 72.1 % (ref 38.5–71.5)
PHOSPHATE SERPL-MCNC: 3.7 MG/DL (ref 2.5–4.9)
PLATELET # BLD AUTO: 393 K/UL (ref 179–408)
POTASSIUM SERPL-SCNC: 4.6 MMOL/L (ref 3.5–5.1)
RBC # BLD AUTO: 3.88 MIL/UL (ref 3.63–4.92)
SODIUM SERPL-SCNC: 141 MMOL/L (ref 136–145)
WBC # BLD AUTO: 11.6 K/UL (ref 3.8–11.8)
WS STN SPEC: 6.3 G/DL (ref 6.4–8.2)

## 2023-08-12 RX ADMIN — Medication SCH MG: at 06:18

## 2023-08-12 RX ADMIN — APIXABAN SCH MG: 5 TABLET, FILM COATED ORAL at 16:06

## 2023-08-12 RX ADMIN — GABAPENTIN SCH MG: 300 CAPSULE ORAL at 08:54

## 2023-08-12 RX ADMIN — ATORVASTATIN CALCIUM SCH MG: 10 TABLET, FILM COATED ORAL at 21:00

## 2023-08-12 RX ADMIN — CLONIDINE HYDROCHLORIDE SCH MG: 0.1 TABLET ORAL at 08:32

## 2023-08-12 RX ADMIN — DOXEPIN HYDROCHLORIDE PRN MG: 25 CAPSULE ORAL at 21:00

## 2023-08-12 RX ADMIN — FERROUS SULFATE TAB 325 MG (65 MG ELEMENTAL FE) SCH MG: 325 (65 FE) TAB at 16:12

## 2023-08-12 RX ADMIN — GABAPENTIN SCH MG: 300 CAPSULE ORAL at 13:10

## 2023-08-12 RX ADMIN — CHLORDIAZEPOXIDE HYDROCHLORIDE PRN MG: 25 CAPSULE ORAL at 16:04

## 2023-08-12 RX ADMIN — APIXABAN SCH MG: 5 TABLET, FILM COATED ORAL at 09:04

## 2023-08-12 RX ADMIN — GABAPENTIN SCH MG: 300 CAPSULE ORAL at 16:04

## 2023-08-12 RX ADMIN — Medication SCH MG: at 08:55

## 2023-08-12 RX ADMIN — Medication SCH MG: at 21:00

## 2023-08-12 RX ADMIN — ARIPIPRAZOLE SCH MG: 5 TABLET ORAL at 08:55

## 2023-08-12 RX ADMIN — VENLAFAXINE HYDROCHLORIDE SCH MG: 150 CAPSULE, EXTENDED RELEASE ORAL at 08:33

## 2023-08-12 RX ADMIN — Medication SCH EA: at 13:08

## 2023-08-12 RX ADMIN — Medication SCH MG: at 13:13

## 2023-08-12 RX ADMIN — Medication SCH EA: at 08:55

## 2023-08-12 RX ADMIN — LEVOTHYROXINE SODIUM SCH MCG: 25 TABLET ORAL at 06:18

## 2023-08-12 RX ADMIN — NYSTATIN SCH APPLIC: 100000 CREAM TOPICAL at 20:55

## 2023-08-12 RX ADMIN — ALLOPURINOL SCH MG: 100 TABLET ORAL at 08:54

## 2023-08-12 RX ADMIN — CLONIDINE HYDROCHLORIDE SCH MG: 0.1 TABLET ORAL at 21:00

## 2023-08-12 RX ADMIN — FERROUS SULFATE TAB 325 MG (65 MG ELEMENTAL FE) SCH MG: 325 (65 FE) TAB at 08:54

## 2023-08-12 RX ADMIN — Medication SCH EA: at 16:12

## 2023-08-12 RX ADMIN — DILTIAZEM HYDROCHLORIDE SCH MG: 240 CAPSULE, EXTENDED RELEASE ORAL at 08:32

## 2023-08-12 RX ADMIN — THERA TABS SCH UDTAB: TAB at 08:55

## 2023-08-13 VITALS — OXYGEN SATURATION: 98 % | SYSTOLIC BLOOD PRESSURE: 137 MMHG | DIASTOLIC BLOOD PRESSURE: 63 MMHG | TEMPERATURE: 97.6 F

## 2023-08-13 VITALS — OXYGEN SATURATION: 97 % | TEMPERATURE: 98.2 F | DIASTOLIC BLOOD PRESSURE: 76 MMHG | SYSTOLIC BLOOD PRESSURE: 124 MMHG

## 2023-08-13 VITALS — TEMPERATURE: 98.2 F | SYSTOLIC BLOOD PRESSURE: 146 MMHG | OXYGEN SATURATION: 96 % | DIASTOLIC BLOOD PRESSURE: 80 MMHG

## 2023-08-13 VITALS — SYSTOLIC BLOOD PRESSURE: 138 MMHG | DIASTOLIC BLOOD PRESSURE: 69 MMHG | OXYGEN SATURATION: 95 % | TEMPERATURE: 98 F

## 2023-08-13 VITALS — OXYGEN SATURATION: 98 %

## 2023-08-13 LAB
ALBUMIN SERPL BCG-MCNC: 2.7 G/DL (ref 3.4–5)
ALP SERPL-CCNC: 88 U/L (ref 50–136)
ALT SERPL W/O P-5'-P-CCNC: 26 U/L (ref 14–59)
APPEARANCE UR: CLEAR
AST SERPL-CCNC: 14 U/L (ref 15–37)
BASOPHILS # BLD AUTO: 0 K/UL (ref 0–0.2)
BASOPHILS NFR BLD AUTO: 0.4 % (ref 0–2)
BILIRUB SERPL-MCNC: 0.2 MG/DL (ref 0.2–1)
BILIRUB UR QL STRIP: NEGATIVE
BUN SERPL-MCNC: 30 MG/DL (ref 7–18)
CALCIUM SERPL-MCNC: 8.3 MG/DL (ref 8.5–10.1)
CHLORIDE SERPL-SCNC: 104 MMOL/L (ref 98–107)
CO2 SERPL-SCNC: 33 MMOL/L (ref 21–32)
COLOR UR: YELLOW
CREAT SERPL-MCNC: 1.6 MG/DL (ref 0.6–1.3)
CREAT UR-MCNC: 117.1 MG/DL (ref 30–125)
DEPRECATED SQUAMOUS URNS QL MICRO: (no result) /HPF
EOSINOPHIL # BLD AUTO: 0.3 K/UL (ref 0–0.7)
EOSINOPHIL NFR BLD AUTO: 2.4 % (ref 0–7)
ERYTHROCYTE [DISTWIDTH] IN BLOOD BY AUTOMATED COUNT: 19.3 % (ref 12.3–17.7)
GLUCOSE SERPL-MCNC: 116 MG/DL (ref 74–106)
GLUCOSE UR STRIP-MCNC: (no result) MG/DL
HCT VFR BLD AUTO: 37.6 % (ref 31.2–41.9)
HGB BLD-MCNC: 11.7 G/DL (ref 10.9–14.3)
HGB UR QL STRIP: NEGATIVE
KETONES UR STRIP-MCNC: NEGATIVE MG/DL
LEUKOCYTE ESTERASE UR QL STRIP: NEGATIVE
LYMPHOCYTES # BLD AUTO: 2 K/UL (ref 0.8–4.8)
LYMPHOCYTES NFR BLD AUTO: 18.9 % (ref 20.5–51.5)
MAGNESIUM SERPL-MCNC: 2.2 MG/DL (ref 1.8–2.4)
MANUAL DIF COMMENT BLD-IMP: 1
MCH RBC QN AUTO: 29.3 UUG (ref 24.7–32.8)
MCHC RBC AUTO-ENTMCNC: 31 G/DL (ref 32.3–35.6)
MCV RBC AUTO: 94.3 FL (ref 75.5–95.3)
MONOCYTES # BLD AUTO: 0.8 K/UL (ref 0.1–1.3)
MONOCYTES NFR BLD AUTO: 8 % (ref 0–11)
NEUTROPHILS # BLD AUTO: 7.4 K/UL (ref 1.8–8.9)
NEUTROPHILS NFR BLD AUTO: 70.3 % (ref 38.5–71.5)
NITRITE UR QL STRIP: NEGATIVE
PH UR STRIP: 5.5 [PH] (ref 5–8)
PHOSPHATE SERPL-MCNC: 3.2 MG/DL (ref 2.5–4.9)
PLATELET # BLD AUTO: 372 K/UL (ref 179–408)
POTASSIUM SERPL-SCNC: 4.1 MMOL/L (ref 3.5–5.1)
PROT UR QL STRIP: (no result)
RBC # BLD AUTO: 3.99 MIL/UL (ref 3.63–4.92)
RBC #/AREA URNS HPF: (no result) /HPF (ref 0–3)
SODIUM SERPL-SCNC: 140 MMOL/L (ref 136–145)
SODIUM UR-SCNC: 27 MMOL/L (ref 40–220)
SP GR UR STRIP: 1.02 (ref 1–1.03)
UROBILINOGEN UR STRIP-MCNC: 0.2 E.U./DL
WBC # BLD AUTO: 10.5 K/UL (ref 3.8–11.8)
WBC #/AREA URNS HPF: (no result) /HPF
WBC #/AREA URNS HPF: (no result) /HPF (ref 0–3)
WS STN SPEC: 6.2 G/DL (ref 6.4–8.2)
YEAST URNS QL MICRO: (no result) /HPF

## 2023-08-13 RX ADMIN — FERROUS SULFATE TAB 325 MG (65 MG ELEMENTAL FE) SCH MG: 325 (65 FE) TAB at 08:13

## 2023-08-13 RX ADMIN — NYSTATIN SCH APPLIC: 100000 CREAM TOPICAL at 21:28

## 2023-08-13 RX ADMIN — GABAPENTIN SCH MG: 300 CAPSULE ORAL at 12:09

## 2023-08-13 RX ADMIN — APIXABAN SCH MG: 5 TABLET, FILM COATED ORAL at 08:15

## 2023-08-13 RX ADMIN — NYSTATIN SCH APPLIC: 100000 CREAM TOPICAL at 08:17

## 2023-08-13 RX ADMIN — GABAPENTIN SCH MG: 300 CAPSULE ORAL at 16:54

## 2023-08-13 RX ADMIN — Medication SCH EA: at 08:21

## 2023-08-13 RX ADMIN — ARIPIPRAZOLE SCH MG: 5 TABLET ORAL at 08:13

## 2023-08-13 RX ADMIN — Medication SCH MG: at 05:49

## 2023-08-13 RX ADMIN — Medication SCH MG: at 08:12

## 2023-08-13 RX ADMIN — CHLORDIAZEPOXIDE HYDROCHLORIDE PRN MG: 25 CAPSULE ORAL at 14:38

## 2023-08-13 RX ADMIN — CLONIDINE HYDROCHLORIDE SCH MG: 0.1 TABLET ORAL at 21:06

## 2023-08-13 RX ADMIN — ALLOPURINOL SCH MG: 100 TABLET ORAL at 08:13

## 2023-08-13 RX ADMIN — Medication SCH EA: at 12:09

## 2023-08-13 RX ADMIN — DOXEPIN HYDROCHLORIDE PRN MG: 25 CAPSULE ORAL at 21:08

## 2023-08-13 RX ADMIN — GABAPENTIN SCH MG: 300 CAPSULE ORAL at 08:13

## 2023-08-13 RX ADMIN — LEVOTHYROXINE SODIUM SCH MCG: 25 TABLET ORAL at 06:02

## 2023-08-13 RX ADMIN — VENLAFAXINE HYDROCHLORIDE SCH MG: 150 CAPSULE, EXTENDED RELEASE ORAL at 08:20

## 2023-08-13 RX ADMIN — Medication SCH MG: at 12:58

## 2023-08-13 RX ADMIN — Medication SCH EA: at 17:00

## 2023-08-13 RX ADMIN — CHLORDIAZEPOXIDE HYDROCHLORIDE PRN MG: 25 CAPSULE ORAL at 08:30

## 2023-08-13 RX ADMIN — DILTIAZEM HYDROCHLORIDE SCH MG: 240 CAPSULE, EXTENDED RELEASE ORAL at 08:19

## 2023-08-13 RX ADMIN — FERROUS SULFATE TAB 325 MG (65 MG ELEMENTAL FE) SCH MG: 325 (65 FE) TAB at 16:54

## 2023-08-13 RX ADMIN — Medication SCH MG: at 21:07

## 2023-08-13 RX ADMIN — ATORVASTATIN CALCIUM SCH MG: 10 TABLET, FILM COATED ORAL at 21:06

## 2023-08-13 RX ADMIN — ACETAMINOPHEN PRN MG: 325 TABLET ORAL at 21:55

## 2023-08-13 RX ADMIN — APIXABAN SCH MG: 5 TABLET, FILM COATED ORAL at 16:55

## 2023-08-13 RX ADMIN — CLONIDINE HYDROCHLORIDE SCH MG: 0.1 TABLET ORAL at 08:19

## 2023-08-13 RX ADMIN — THERA TABS SCH UDTAB: TAB at 08:13

## 2023-08-14 VITALS — SYSTOLIC BLOOD PRESSURE: 115 MMHG | TEMPERATURE: 98.6 F | DIASTOLIC BLOOD PRESSURE: 65 MMHG | OXYGEN SATURATION: 94 %

## 2023-08-14 VITALS — OXYGEN SATURATION: 98 %

## 2023-08-14 RX ADMIN — Medication SCH MG: at 06:50

## 2023-08-14 RX ADMIN — LEVOTHYROXINE SODIUM SCH MCG: 25 TABLET ORAL at 06:50

## 2023-08-14 RX ADMIN — NYSTATIN SCH APPLIC: 100000 CREAM TOPICAL at 20:24

## 2023-08-14 RX ADMIN — ARIPIPRAZOLE SCH MG: 5 TABLET ORAL at 08:14

## 2023-08-14 RX ADMIN — ALLOPURINOL SCH MG: 100 TABLET ORAL at 08:14

## 2023-08-14 RX ADMIN — Medication SCH MG: at 08:14

## 2023-08-14 RX ADMIN — Medication SCH EA: at 12:42

## 2023-08-14 RX ADMIN — Medication SCH EA: at 17:27

## 2023-08-14 RX ADMIN — VENLAFAXINE HYDROCHLORIDE SCH MG: 150 CAPSULE, EXTENDED RELEASE ORAL at 08:15

## 2023-08-14 RX ADMIN — Medication SCH EA: at 08:12

## 2023-08-14 RX ADMIN — CLONIDINE HYDROCHLORIDE SCH MG: 0.1 TABLET ORAL at 20:24

## 2023-08-14 RX ADMIN — CLONIDINE HYDROCHLORIDE SCH MG: 0.1 TABLET ORAL at 08:14

## 2023-08-14 RX ADMIN — CHLORDIAZEPOXIDE HYDROCHLORIDE PRN MG: 25 CAPSULE ORAL at 14:14

## 2023-08-14 RX ADMIN — DOXEPIN HYDROCHLORIDE PRN MG: 25 CAPSULE ORAL at 21:04

## 2023-08-14 RX ADMIN — GABAPENTIN SCH MG: 300 CAPSULE ORAL at 16:51

## 2023-08-14 RX ADMIN — GABAPENTIN SCH MG: 300 CAPSULE ORAL at 08:15

## 2023-08-14 RX ADMIN — FERROUS SULFATE TAB 325 MG (65 MG ELEMENTAL FE) SCH MG: 325 (65 FE) TAB at 08:13

## 2023-08-14 RX ADMIN — APIXABAN SCH MG: 5 TABLET, FILM COATED ORAL at 08:13

## 2023-08-14 RX ADMIN — THERA TABS SCH UDTAB: TAB at 08:14

## 2023-08-14 RX ADMIN — ATORVASTATIN CALCIUM SCH MG: 10 TABLET, FILM COATED ORAL at 20:23

## 2023-08-14 RX ADMIN — APIXABAN SCH MG: 5 TABLET, FILM COATED ORAL at 16:52

## 2023-08-14 RX ADMIN — FERROUS SULFATE TAB 325 MG (65 MG ELEMENTAL FE) SCH MG: 325 (65 FE) TAB at 16:51

## 2023-08-14 RX ADMIN — CHLORDIAZEPOXIDE HYDROCHLORIDE PRN MG: 25 CAPSULE ORAL at 08:13

## 2023-08-14 RX ADMIN — NYSTATIN SCH APPLIC: 100000 CREAM TOPICAL at 08:16

## 2023-08-14 RX ADMIN — GABAPENTIN SCH MG: 300 CAPSULE ORAL at 12:42

## 2023-08-14 RX ADMIN — DILTIAZEM HYDROCHLORIDE SCH MG: 240 CAPSULE, EXTENDED RELEASE ORAL at 08:14

## 2023-08-15 VITALS — TEMPERATURE: 98 F | DIASTOLIC BLOOD PRESSURE: 49 MMHG | OXYGEN SATURATION: 94 % | SYSTOLIC BLOOD PRESSURE: 103 MMHG

## 2023-08-15 VITALS — TEMPERATURE: 97.7 F | OXYGEN SATURATION: 95 % | DIASTOLIC BLOOD PRESSURE: 72 MMHG | SYSTOLIC BLOOD PRESSURE: 117 MMHG

## 2023-08-15 VITALS — SYSTOLIC BLOOD PRESSURE: 103 MMHG | DIASTOLIC BLOOD PRESSURE: 49 MMHG

## 2023-08-15 VITALS — OXYGEN SATURATION: 92 % | SYSTOLIC BLOOD PRESSURE: 101 MMHG | TEMPERATURE: 98.5 F | DIASTOLIC BLOOD PRESSURE: 64 MMHG

## 2023-08-15 LAB
ALBUMIN SERPL BCG-MCNC: 2.5 G/DL (ref 3.4–5)
ALP SERPL-CCNC: 67 U/L (ref 50–136)
ALT SERPL W/O P-5'-P-CCNC: 27 U/L (ref 14–59)
AST SERPL-CCNC: 13 U/L (ref 15–37)
BASOPHILS # BLD AUTO: 0 K/UL (ref 0–0.2)
BASOPHILS NFR BLD AUTO: 0.4 % (ref 0–2)
BILIRUB SERPL-MCNC: 0.3 MG/DL (ref 0.2–1)
BUN SERPL-MCNC: 30 MG/DL (ref 7–18)
CALCIUM SERPL-MCNC: 8.3 MG/DL (ref 8.5–10.1)
CHLORIDE SERPL-SCNC: 107 MMOL/L (ref 98–107)
CHOLEST SERPL-MCNC: 149 MG/DL (ref ?–200)
CO2 SERPL-SCNC: 30 MMOL/L (ref 21–32)
CREAT SERPL-MCNC: 1.5 MG/DL (ref 0.6–1.3)
EOSINOPHIL # BLD AUTO: 0.2 K/UL (ref 0–0.7)
EOSINOPHIL NFR BLD AUTO: 2 % (ref 0–7)
ERYTHROCYTE [DISTWIDTH] IN BLOOD BY AUTOMATED COUNT: 19.8 % (ref 12.3–17.7)
GLUCOSE SERPL-MCNC: 103 MG/DL (ref 74–106)
HCT VFR BLD AUTO: 35.7 % (ref 31.2–41.9)
HDLC SERPL-MCNC: 77 MG/DL (ref 40–60)
HGB BLD-MCNC: 11.3 G/DL (ref 10.9–14.3)
LYMPHOCYTES # BLD AUTO: 2.3 K/UL (ref 0.8–4.8)
LYMPHOCYTES NFR BLD AUTO: 22.5 % (ref 20.5–51.5)
MAGNESIUM SERPL-MCNC: 2.3 MG/DL (ref 1.8–2.4)
MANUAL DIF COMMENT BLD-IMP: 1
MCH RBC QN AUTO: 29.7 UUG (ref 24.7–32.8)
MCHC RBC AUTO-ENTMCNC: 32 G/DL (ref 32.3–35.6)
MCV RBC AUTO: 94.2 FL (ref 75.5–95.3)
MONOCYTES # BLD AUTO: 0.8 K/UL (ref 0.1–1.3)
MONOCYTES NFR BLD AUTO: 8.2 % (ref 0–11)
NEUTROPHILS # BLD AUTO: 6.8 K/UL (ref 1.8–8.9)
NEUTROPHILS NFR BLD AUTO: 66.9 % (ref 38.5–71.5)
PHOSPHATE SERPL-MCNC: 3.9 MG/DL (ref 2.5–4.9)
PLATELET # BLD AUTO: 323 K/UL (ref 179–408)
POTASSIUM SERPL-SCNC: 4.4 MMOL/L (ref 3.5–5.1)
RBC # BLD AUTO: 3.79 MIL/UL (ref 3.63–4.92)
SODIUM SERPL-SCNC: 143 MMOL/L (ref 136–145)
TRIGL SERPL-MCNC: 117 MG/DL (ref 30–150)
TSH SERPL DL<=0.005 MIU/L-ACNC: 1.34 MIU/ML (ref 0.36–3.74)
VIT B12 SERPL-MCNC: 377 PG/ML (ref 193–986)
WBC # BLD AUTO: 10.1 K/UL (ref 3.8–11.8)
WS STN SPEC: 6.1 G/DL (ref 6.4–8.2)

## 2023-08-15 RX ADMIN — Medication SCH EA: at 12:40

## 2023-08-15 RX ADMIN — DILTIAZEM HYDROCHLORIDE SCH MG: 240 CAPSULE, EXTENDED RELEASE ORAL at 08:46

## 2023-08-15 RX ADMIN — THERA TABS SCH UDTAB: TAB at 08:45

## 2023-08-15 RX ADMIN — NYSTATIN SCH APPLIC: 100000 CREAM TOPICAL at 08:48

## 2023-08-15 RX ADMIN — CLONIDINE HYDROCHLORIDE SCH MG: 0.1 TABLET ORAL at 08:45

## 2023-08-15 RX ADMIN — ALLOPURINOL SCH MG: 100 TABLET ORAL at 08:48

## 2023-08-15 RX ADMIN — GABAPENTIN SCH MG: 300 CAPSULE ORAL at 12:29

## 2023-08-15 RX ADMIN — LEVOTHYROXINE SODIUM SCH MCG: 25 TABLET ORAL at 06:02

## 2023-08-15 RX ADMIN — FERROUS SULFATE TAB 325 MG (65 MG ELEMENTAL FE) SCH MG: 325 (65 FE) TAB at 08:52

## 2023-08-15 RX ADMIN — Medication SCH MG: at 08:46

## 2023-08-15 RX ADMIN — VENLAFAXINE HYDROCHLORIDE SCH MG: 150 CAPSULE, EXTENDED RELEASE ORAL at 08:49

## 2023-08-15 RX ADMIN — APIXABAN SCH MG: 5 TABLET, FILM COATED ORAL at 08:47

## 2023-08-15 RX ADMIN — GABAPENTIN SCH MG: 300 CAPSULE ORAL at 08:46

## 2023-08-15 RX ADMIN — Medication SCH EA: at 08:48

## 2023-08-16 ENCOUNTER — HOSPITAL ENCOUNTER (EMERGENCY)
Dept: HOSPITAL 12 - ER | Age: 78
Discharge: HOME | End: 2023-08-16
Payer: MEDICARE

## 2023-08-16 VITALS — HEIGHT: 65 IN | WEIGHT: 250 LBS | BODY MASS INDEX: 41.65 KG/M2

## 2023-08-16 VITALS — OXYGEN SATURATION: 92 %

## 2023-08-16 DIAGNOSIS — Z79.899: ICD-10-CM

## 2023-08-16 DIAGNOSIS — I12.9: ICD-10-CM

## 2023-08-16 DIAGNOSIS — R60.0: ICD-10-CM
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
## 2023-08-16 DIAGNOSIS — J44.9: ICD-10-CM
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
## 2023-08-16 DIAGNOSIS — D72.829: ICD-10-CM

## 2023-08-16 DIAGNOSIS — E78.5: ICD-10-CM

## 2023-08-16 DIAGNOSIS — N18.9: ICD-10-CM

## 2023-08-16 DIAGNOSIS — E11.22: ICD-10-CM

## 2023-08-16 DIAGNOSIS — E03.9: ICD-10-CM
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
## 2023-08-16 DIAGNOSIS — N39.0: ICD-10-CM

## 2023-08-16 DIAGNOSIS — B37.9: ICD-10-CM

## 2023-08-16 DIAGNOSIS — E88.09: ICD-10-CM

## 2023-08-16 DIAGNOSIS — R07.89: ICD-10-CM

## 2023-08-16 DIAGNOSIS — S81.811A: Primary | ICD-10-CM

## 2023-08-16 DIAGNOSIS — I48.91: ICD-10-CM

## 2023-08-16 LAB
ALBUMIN SERPL BCG-MCNC: 2.9 G/DL (ref 3.4–5)
ALP SERPL-CCNC: 76 U/L (ref 50–136)
ALT SERPL W/O P-5'-P-CCNC: 26 U/L (ref 14–59)
APPEARANCE UR: CLEAR
AST SERPL-CCNC: 11 U/L (ref 15–37)
BASOPHILS # BLD AUTO: 0 K/UL (ref 0–0.2)
BASOPHILS NFR BLD AUTO: 0.3 % (ref 0–2)
BILIRUB DIRECT SERPL-MCNC: 0.1 MG/DL (ref 0–0.2)
BILIRUB SERPL-MCNC: 0.3 MG/DL (ref 0.2–1)
BILIRUB UR QL STRIP: NEGATIVE
BUN SERPL-MCNC: 35 MG/DL (ref 7–18)
CALCIUM SERPL-MCNC: 8.7 MG/DL (ref 8.5–10.1)
CHLORIDE SERPL-SCNC: 108 MMOL/L (ref 98–107)
CO2 SERPL-SCNC: 29 MMOL/L (ref 21–32)
COLOR UR: YELLOW
CREAT SERPL-MCNC: 1.6 MG/DL (ref 0.6–1.3)
DEPRECATED SQUAMOUS URNS QL MICRO: (no result) /HPF
EOSINOPHIL # BLD AUTO: 0.2 K/UL (ref 0–0.7)
EOSINOPHIL NFR BLD AUTO: 1.5 % (ref 0–7)
ERYTHROCYTE [DISTWIDTH] IN BLOOD BY AUTOMATED COUNT: 19.5 % (ref 12.3–17.7)
GLUCOSE SERPL-MCNC: 115 MG/DL (ref 74–106)
GLUCOSE UR STRIP-MCNC: (no result) MG/DL
HCT VFR BLD AUTO: 35.8 % (ref 31.2–41.9)
HGB BLD-MCNC: 10.8 G/DL (ref 10.9–14.3)
HGB UR QL STRIP: NEGATIVE
KETONES UR STRIP-MCNC: NEGATIVE MG/DL
LEUKOCYTE ESTERASE UR QL STRIP: NEGATIVE
LYMPHOCYTES # BLD AUTO: 1.6 K/UL (ref 0.8–4.8)
LYMPHOCYTES NFR BLD AUTO: 11.4 % (ref 20.5–51.5)
MANUAL DIF COMMENT BLD-IMP: 1
MCH RBC QN AUTO: 28.4 UUG (ref 24.7–32.8)
MCHC RBC AUTO-ENTMCNC: 30 G/DL (ref 32.3–35.6)
MCV RBC AUTO: 94.1 FL (ref 75.5–95.3)
MONOCYTES # BLD AUTO: 0.9 K/UL (ref 0.1–1.3)
MONOCYTES NFR BLD AUTO: 6.2 % (ref 0–11)
NEUTROPHILS # BLD AUTO: 11.2 K/UL (ref 1.8–8.9)
NEUTROPHILS NFR BLD AUTO: 80.6 % (ref 38.5–71.5)
NITRITE UR QL STRIP: NEGATIVE
PH UR STRIP: 5.5 [PH] (ref 5–8)
PLATELET # BLD AUTO: 319 K/UL (ref 179–408)
POTASSIUM SERPL-SCNC: 3.8 MMOL/L (ref 3.5–5.1)
PROT UR QL STRIP: (no result)
RBC # BLD AUTO: 3.81 MIL/UL (ref 3.63–4.92)
RBC #/AREA URNS HPF: (no result) /HPF (ref 0–3)
SODIUM SERPL-SCNC: 145 MMOL/L (ref 136–145)
SP GR UR STRIP: 1.02 (ref 1–1.03)
UROBILINOGEN UR STRIP-MCNC: 0.2 E.U./DL
WBC # BLD AUTO: 13.9 K/UL (ref 3.8–11.8)
WBC #/AREA URNS HPF: (no result) /HPF
WBC #/AREA URNS HPF: (no result) /HPF (ref 0–3)
WS STN SPEC: 6.4 G/DL (ref 6.4–8.2)
YEAST URNS QL MICRO: (no result) /HPF

## 2023-08-16 PROCEDURE — A4663 DIALYSIS BLOOD PRESSURE CUFF: HCPCS

## 2023-08-17 ENCOUNTER — HOSPITAL ENCOUNTER (INPATIENT)
Dept: HOSPITAL 12 - ER | Age: 78
LOS: 5 days | Discharge: NURSING, REHAB OR LONG-TERM CARE | DRG: 871 | End: 2023-08-22
Admitting: INTERNAL MEDICINE
Payer: MEDICARE

## 2023-08-17 VITALS — DIASTOLIC BLOOD PRESSURE: 41 MMHG | OXYGEN SATURATION: 93 % | SYSTOLIC BLOOD PRESSURE: 95 MMHG | TEMPERATURE: 98.7 F

## 2023-08-17 VITALS — SYSTOLIC BLOOD PRESSURE: 125 MMHG | TEMPERATURE: 98.4 F | OXYGEN SATURATION: 95 % | DIASTOLIC BLOOD PRESSURE: 70 MMHG

## 2023-08-17 VITALS — BODY MASS INDEX: 41.32 KG/M2 | HEIGHT: 65 IN | WEIGHT: 248 LBS

## 2023-08-17 DIAGNOSIS — D68.69: ICD-10-CM

## 2023-08-17 DIAGNOSIS — L97.828: ICD-10-CM

## 2023-08-17 DIAGNOSIS — F39: ICD-10-CM

## 2023-08-17 DIAGNOSIS — G92.8: ICD-10-CM

## 2023-08-17 DIAGNOSIS — M89.8X9: ICD-10-CM

## 2023-08-17 DIAGNOSIS — Z74.09: ICD-10-CM

## 2023-08-17 DIAGNOSIS — I25.10: ICD-10-CM

## 2023-08-17 DIAGNOSIS — L03.116: ICD-10-CM

## 2023-08-17 DIAGNOSIS — Z98.1: ICD-10-CM

## 2023-08-17 DIAGNOSIS — E03.9: ICD-10-CM

## 2023-08-17 DIAGNOSIS — I13.0: ICD-10-CM

## 2023-08-17 DIAGNOSIS — Z79.84: ICD-10-CM

## 2023-08-17 DIAGNOSIS — I48.20: ICD-10-CM

## 2023-08-17 DIAGNOSIS — I27.20: ICD-10-CM

## 2023-08-17 DIAGNOSIS — F33.3: ICD-10-CM

## 2023-08-17 DIAGNOSIS — R53.1: ICD-10-CM

## 2023-08-17 DIAGNOSIS — G89.29: ICD-10-CM

## 2023-08-17 DIAGNOSIS — J44.9: ICD-10-CM

## 2023-08-17 DIAGNOSIS — E43: ICD-10-CM

## 2023-08-17 DIAGNOSIS — L97.818: ICD-10-CM

## 2023-08-17 DIAGNOSIS — I50.31: ICD-10-CM

## 2023-08-17 DIAGNOSIS — Z79.01: ICD-10-CM

## 2023-08-17 DIAGNOSIS — E78.5: ICD-10-CM

## 2023-08-17 DIAGNOSIS — N18.2: ICD-10-CM

## 2023-08-17 DIAGNOSIS — E87.20: ICD-10-CM

## 2023-08-17 DIAGNOSIS — E66.01: ICD-10-CM

## 2023-08-17 DIAGNOSIS — F19.10: ICD-10-CM

## 2023-08-17 DIAGNOSIS — Z79.899: ICD-10-CM

## 2023-08-17 DIAGNOSIS — E11.22: ICD-10-CM

## 2023-08-17 DIAGNOSIS — B37.49: ICD-10-CM

## 2023-08-17 DIAGNOSIS — F41.9: ICD-10-CM

## 2023-08-17 DIAGNOSIS — N17.0: ICD-10-CM

## 2023-08-17 DIAGNOSIS — D64.9: ICD-10-CM

## 2023-08-17 DIAGNOSIS — L03.115: ICD-10-CM

## 2023-08-17 DIAGNOSIS — I87.313: ICD-10-CM

## 2023-08-17 DIAGNOSIS — A41.9: Primary | ICD-10-CM

## 2023-08-17 LAB
ALBUMIN SERPL BCG-MCNC: 3.1 G/DL (ref 3.4–5)
ALP SERPL-CCNC: 69 U/L (ref 50–136)
ALT SERPL W/O P-5'-P-CCNC: 28 U/L (ref 14–59)
APPEARANCE UR: CLEAR
AST SERPL-CCNC: 23 U/L (ref 15–37)
BASOPHILS # BLD AUTO: 0.2 K/UL (ref 0–0.2)
BASOPHILS NFR BLD AUTO: 1 % (ref 0–2)
BILIRUB DIRECT SERPL-MCNC: 0.3 MG/DL (ref 0–0.2)
BILIRUB SERPL-MCNC: 1 MG/DL (ref 0.2–1)
BILIRUB UR QL STRIP: NEGATIVE
BUN SERPL-MCNC: 23 MG/DL (ref 7–18)
CALCIUM SERPL-MCNC: 9.1 MG/DL (ref 8.5–10.1)
CHLORIDE SERPL-SCNC: 106 MMOL/L (ref 98–107)
CO2 SERPL-SCNC: 29 MMOL/L (ref 21–32)
COLOR UR: YELLOW
CREAT SERPL-MCNC: 1.5 MG/DL (ref 0.6–1.3)
CREAT UR-MCNC: 69.7 MG/DL (ref 30–125)
DEPRECATED SQUAMOUS URNS QL MICRO: (no result) /HPF
EOSINOPHIL # BLD AUTO: 0.1 K/UL (ref 0–0.7)
EOSINOPHIL NFR BLD AUTO: 0.8 % (ref 0–7)
ERYTHROCYTE [DISTWIDTH] IN BLOOD BY AUTOMATED COUNT: 20 % (ref 12.3–17.7)
GLUCOSE SERPL-MCNC: 129 MG/DL (ref 74–106)
GLUCOSE UR STRIP-MCNC: (no result) MG/DL
HCT VFR BLD AUTO: 34.6 % (ref 31.2–41.9)
HGB BLD-MCNC: 10.6 G/DL (ref 10.9–14.3)
HGB UR QL STRIP: (no result)
KETONES UR STRIP-MCNC: (no result) MG/DL
LACTATE SERPL-SCNC: 2.3 MMOL/L (ref 0.4–2)
LEUKOCYTE ESTERASE UR QL STRIP: NEGATIVE
LYMPHOCYTES # BLD AUTO: 1.1 K/UL (ref 0.8–4.8)
LYMPHOCYTES NFR BLD AUTO: 6.4 % (ref 20.5–51.5)
MANUAL DIF COMMENT BLD-IMP: 1
MCH RBC QN AUTO: 29 UUG (ref 24.7–32.8)
MCHC RBC AUTO-ENTMCNC: 31 G/DL (ref 32.3–35.6)
MCV RBC AUTO: 94.6 FL (ref 75.5–95.3)
MONOCYTES # BLD AUTO: 1 K/UL (ref 0.1–1.3)
MONOCYTES NFR BLD AUTO: 6.1 % (ref 0–11)
NEUTROPHILS # BLD AUTO: 14.7 K/UL (ref 1.8–8.9)
NEUTROPHILS NFR BLD AUTO: 85.7 % (ref 38.5–71.5)
NITRITE UR QL STRIP: NEGATIVE
PH UR STRIP: 6.5 [PH] (ref 5–8)
PLATELET # BLD AUTO: 327 K/UL (ref 179–408)
POTASSIUM SERPL-SCNC: 3.9 MMOL/L (ref 3.5–5.1)
PROT UR QL STRIP: (no result)
PROT UR-MCNC: 62.3 MG/DL
RBC # BLD AUTO: 3.65 MIL/UL (ref 3.63–4.92)
RBC #/AREA URNS HPF: (no result) /HPF (ref 0–3)
SODIUM SERPL-SCNC: 142 MMOL/L (ref 136–145)
SODIUM UR-SCNC: 93 MMOL/L (ref 40–220)
SP GR UR STRIP: 1.02 (ref 1–1.03)
UROBILINOGEN UR STRIP-MCNC: 0.2 E.U./DL
WBC # BLD AUTO: 17.1 K/UL (ref 3.8–11.8)
WBC #/AREA URNS HPF: (no result) /HPF
WBC #/AREA URNS HPF: (no result) /HPF (ref 0–3)
WS STN SPEC: 6.5 G/DL (ref 6.4–8.2)
YEAST URNS QL MICRO: (no result) /HPF

## 2023-08-17 PROCEDURE — A6213 FOAM DRG >16<=48 SQ IN W/BDR: HCPCS

## 2023-08-17 PROCEDURE — G0378 HOSPITAL OBSERVATION PER HR: HCPCS

## 2023-08-17 PROCEDURE — A4663 DIALYSIS BLOOD PRESSURE CUFF: HCPCS

## 2023-08-17 RX ADMIN — CLONIDINE HYDROCHLORIDE SCH MG: 0.1 TABLET ORAL at 12:56

## 2023-08-17 RX ADMIN — DEXTROSE SCH MLS/HR: 50 INJECTION, SOLUTION INTRAVENOUS at 17:48

## 2023-08-17 RX ADMIN — DOCUSATE SODIUM SCH MG: 100 CAPSULE, LIQUID FILLED ORAL at 20:50

## 2023-08-17 RX ADMIN — ATORVASTATIN CALCIUM SCH MG: 10 TABLET, FILM COATED ORAL at 20:51

## 2023-08-17 RX ADMIN — OXYCODONE HYDROCHLORIDE AND ACETAMINOPHEN SCH MG: 500 TABLET ORAL at 12:55

## 2023-08-17 RX ADMIN — ARIPIPRAZOLE SCH MG: 5 TABLET ORAL at 17:13

## 2023-08-17 RX ADMIN — GABAPENTIN SCH MG: 300 CAPSULE ORAL at 17:13

## 2023-08-17 RX ADMIN — PIPERACILLIN SODIUM AND TAZOBACTAM SODIUM SCH MLS/HR: .375; 3 INJECTION, POWDER, LYOPHILIZED, FOR SOLUTION INTRAVENOUS at 14:05

## 2023-08-17 RX ADMIN — CLONIDINE HYDROCHLORIDE SCH MG: 0.1 TABLET ORAL at 20:51

## 2023-08-17 RX ADMIN — PIPERACILLIN SODIUM AND TAZOBACTAM SODIUM SCH MLS/HR: .375; 3 INJECTION, POWDER, LYOPHILIZED, FOR SOLUTION INTRAVENOUS at 21:35

## 2023-08-17 RX ADMIN — DILTIAZEM HYDROCHLORIDE SCH MG: 240 CAPSULE, EXTENDED RELEASE ORAL at 13:11

## 2023-08-17 RX ADMIN — DOXEPIN HYDROCHLORIDE SCH MG: 25 CAPSULE ORAL at 20:51

## 2023-08-17 RX ADMIN — SODIUM CHLORIDE PRN MG: 9 INJECTION, SOLUTION INTRAVENOUS at 13:56

## 2023-08-17 RX ADMIN — ZINC SULFATE CAP 220 MG (50 MG ELEMENTAL ZN) SCH MG: 220 (50 ZN) CAP at 13:16

## 2023-08-17 RX ADMIN — GABAPENTIN SCH MG: 300 CAPSULE ORAL at 12:56

## 2023-08-18 VITALS — TEMPERATURE: 97.9 F | DIASTOLIC BLOOD PRESSURE: 67 MMHG | SYSTOLIC BLOOD PRESSURE: 125 MMHG | OXYGEN SATURATION: 93 %

## 2023-08-18 VITALS — DIASTOLIC BLOOD PRESSURE: 33 MMHG | TEMPERATURE: 98.2 F | OXYGEN SATURATION: 91 % | SYSTOLIC BLOOD PRESSURE: 99 MMHG

## 2023-08-18 VITALS — DIASTOLIC BLOOD PRESSURE: 67 MMHG | TEMPERATURE: 97.6 F | SYSTOLIC BLOOD PRESSURE: 128 MMHG | OXYGEN SATURATION: 94 %

## 2023-08-18 VITALS — SYSTOLIC BLOOD PRESSURE: 106 MMHG | OXYGEN SATURATION: 93 % | DIASTOLIC BLOOD PRESSURE: 63 MMHG | TEMPERATURE: 98.4 F

## 2023-08-18 VITALS — DIASTOLIC BLOOD PRESSURE: 57 MMHG | TEMPERATURE: 98.3 F | OXYGEN SATURATION: 93 % | SYSTOLIC BLOOD PRESSURE: 116 MMHG

## 2023-08-18 VITALS — TEMPERATURE: 98.3 F | SYSTOLIC BLOOD PRESSURE: 109 MMHG | DIASTOLIC BLOOD PRESSURE: 56 MMHG | OXYGEN SATURATION: 93 %

## 2023-08-18 VITALS — TEMPERATURE: 98.1 F | DIASTOLIC BLOOD PRESSURE: 49 MMHG | SYSTOLIC BLOOD PRESSURE: 92 MMHG | OXYGEN SATURATION: 94 %

## 2023-08-18 LAB
ALBUMIN SERPL BCG-MCNC: 2.8 G/DL (ref 3.4–5)
ALP SERPL-CCNC: 63 U/L (ref 50–136)
ALT SERPL W/O P-5'-P-CCNC: 30 U/L (ref 14–59)
AST SERPL-CCNC: 25 U/L (ref 15–37)
BASOPHILS # BLD AUTO: 0.1 K/UL (ref 0–0.2)
BASOPHILS NFR BLD AUTO: 0.9 % (ref 0–2)
BILIRUB SERPL-MCNC: 0.7 MG/DL (ref 0.2–1)
BUN SERPL-MCNC: 22 MG/DL (ref 7–18)
CALCIUM SERPL-MCNC: 8.6 MG/DL (ref 8.5–10.1)
CHLORIDE SERPL-SCNC: 104 MMOL/L (ref 98–107)
CK SERPL-CCNC: 187 U/L (ref 26–192)
CO2 SERPL-SCNC: 30 MMOL/L (ref 21–32)
CREAT SERPL-MCNC: 1.6 MG/DL (ref 0.6–1.3)
EOSINOPHIL # BLD AUTO: 0.4 K/UL (ref 0–0.7)
EOSINOPHIL NFR BLD AUTO: 3 % (ref 0–7)
ERYTHROCYTE [DISTWIDTH] IN BLOOD BY AUTOMATED COUNT: 19.7 % (ref 12.3–17.7)
GLUCOSE SERPL-MCNC: 146 MG/DL (ref 74–106)
HCT VFR BLD AUTO: 30.5 % (ref 31.2–41.9)
HGB BLD-MCNC: 9.6 G/DL (ref 10.9–14.3)
LYMPHOCYTES # BLD AUTO: 2.1 K/UL (ref 0.8–4.8)
LYMPHOCYTES NFR BLD AUTO: 17 % (ref 20.5–51.5)
MAGNESIUM SERPL-MCNC: 2.3 MG/DL (ref 1.8–2.4)
MANUAL DIF COMMENT BLD-IMP: 1
MCH RBC QN AUTO: 29.8 UUG (ref 24.7–32.8)
MCHC RBC AUTO-ENTMCNC: 31 G/DL (ref 32.3–35.6)
MCV RBC AUTO: 94.8 FL (ref 75.5–95.3)
MONOCYTES # BLD AUTO: 0.8 K/UL (ref 0.1–1.3)
MONOCYTES NFR BLD AUTO: 6.8 % (ref 0–11)
NEUTROPHILS # BLD AUTO: 8.8 K/UL (ref 1.8–8.9)
NEUTROPHILS NFR BLD AUTO: 72.3 % (ref 38.5–71.5)
PHOSPHATE SERPL-MCNC: 4 MG/DL (ref 2.5–4.9)
PLATELET # BLD AUTO: 264 K/UL (ref 179–408)
POTASSIUM SERPL-SCNC: 4.4 MMOL/L (ref 3.5–5.1)
RBC # BLD AUTO: 3.21 MIL/UL (ref 3.63–4.92)
SODIUM SERPL-SCNC: 139 MMOL/L (ref 136–145)
WBC # BLD AUTO: 12.1 K/UL (ref 3.8–11.8)
WS STN SPEC: 6.6 G/DL (ref 6.4–8.2)

## 2023-08-18 PROCEDURE — B546ZZA ULTRASONOGRAPHY OF RIGHT SUBCLAVIAN VEIN, GUIDANCE: ICD-10-PCS

## 2023-08-18 PROCEDURE — 05H533Z INSERTION OF INFUSION DEVICE INTO RIGHT SUBCLAVIAN VEIN, PERCUTANEOUS APPROACH: ICD-10-PCS

## 2023-08-18 RX ADMIN — THERA TABS SCH UDTAB: TAB at 09:18

## 2023-08-18 RX ADMIN — PIPERACILLIN SODIUM AND TAZOBACTAM SODIUM SCH MLS/HR: .375; 3 INJECTION, POWDER, LYOPHILIZED, FOR SOLUTION INTRAVENOUS at 22:15

## 2023-08-18 RX ADMIN — DILTIAZEM HYDROCHLORIDE SCH MG: 240 CAPSULE, EXTENDED RELEASE ORAL at 09:41

## 2023-08-18 RX ADMIN — GABAPENTIN SCH MG: 300 CAPSULE ORAL at 17:07

## 2023-08-18 RX ADMIN — DOXEPIN HYDROCHLORIDE SCH MG: 25 CAPSULE ORAL at 21:42

## 2023-08-18 RX ADMIN — GABAPENTIN SCH MG: 300 CAPSULE ORAL at 09:18

## 2023-08-18 RX ADMIN — PANTOPRAZOLE SODIUM SCH MG: 40 TABLET, DELAYED RELEASE ORAL at 06:09

## 2023-08-18 RX ADMIN — PIPERACILLIN SODIUM AND TAZOBACTAM SODIUM SCH MLS/HR: .375; 3 INJECTION, POWDER, LYOPHILIZED, FOR SOLUTION INTRAVENOUS at 14:03

## 2023-08-18 RX ADMIN — ARIPIPRAZOLE SCH MG: 5 TABLET ORAL at 09:18

## 2023-08-18 RX ADMIN — ARIPIPRAZOLE SCH MG: 5 TABLET ORAL at 17:07

## 2023-08-18 RX ADMIN — LEVOTHYROXINE SODIUM SCH MCG: 25 TABLET ORAL at 06:09

## 2023-08-18 RX ADMIN — ATORVASTATIN CALCIUM SCH MG: 10 TABLET, FILM COATED ORAL at 21:42

## 2023-08-18 RX ADMIN — ALLOPURINOL SCH MG: 100 TABLET ORAL at 09:19

## 2023-08-18 RX ADMIN — VENLAFAXINE HYDROCHLORIDE SCH MG: 150 CAPSULE, EXTENDED RELEASE ORAL at 09:41

## 2023-08-18 RX ADMIN — GABAPENTIN SCH MG: 300 CAPSULE ORAL at 13:20

## 2023-08-18 RX ADMIN — OXYCODONE HYDROCHLORIDE AND ACETAMINOPHEN SCH MG: 500 TABLET ORAL at 09:18

## 2023-08-18 RX ADMIN — DEXTROSE SCH MLS/HR: 50 INJECTION, SOLUTION INTRAVENOUS at 21:02

## 2023-08-18 RX ADMIN — ZINC SULFATE CAP 220 MG (50 MG ELEMENTAL ZN) SCH MG: 220 (50 ZN) CAP at 09:17

## 2023-08-18 RX ADMIN — PIPERACILLIN SODIUM AND TAZOBACTAM SODIUM SCH MLS/HR: .375; 3 INJECTION, POWDER, LYOPHILIZED, FOR SOLUTION INTRAVENOUS at 05:36

## 2023-08-18 RX ADMIN — DOCUSATE SODIUM SCH MG: 100 CAPSULE, LIQUID FILLED ORAL at 21:42

## 2023-08-19 VITALS — SYSTOLIC BLOOD PRESSURE: 132 MMHG | TEMPERATURE: 98.4 F | DIASTOLIC BLOOD PRESSURE: 73 MMHG | OXYGEN SATURATION: 99 %

## 2023-08-19 VITALS — TEMPERATURE: 97.6 F

## 2023-08-19 VITALS — OXYGEN SATURATION: 93 % | DIASTOLIC BLOOD PRESSURE: 52 MMHG | TEMPERATURE: 97.9 F | SYSTOLIC BLOOD PRESSURE: 100 MMHG

## 2023-08-19 VITALS — OXYGEN SATURATION: 91 % | DIASTOLIC BLOOD PRESSURE: 55 MMHG | SYSTOLIC BLOOD PRESSURE: 112 MMHG | TEMPERATURE: 98.3 F

## 2023-08-19 VITALS — TEMPERATURE: 97.8 F

## 2023-08-19 LAB
BASOPHILS # BLD AUTO: 0.1 K/UL (ref 0–0.2)
BASOPHILS NFR BLD AUTO: 0.5 % (ref 0–2)
BUN SERPL-MCNC: 22 MG/DL (ref 7–18)
CALCIUM SERPL-MCNC: 8.1 MG/DL (ref 8.5–10.1)
CHLORIDE SERPL-SCNC: 104 MMOL/L (ref 98–107)
CO2 SERPL-SCNC: 29 MMOL/L (ref 21–32)
CREAT SERPL-MCNC: 1.8 MG/DL (ref 0.6–1.3)
EOSINOPHIL # BLD AUTO: 0.3 K/UL (ref 0–0.7)
EOSINOPHIL NFR BLD AUTO: 2.7 % (ref 0–7)
ERYTHROCYTE [DISTWIDTH] IN BLOOD BY AUTOMATED COUNT: 19.9 % (ref 12.3–17.7)
GLUCOSE SERPL-MCNC: 151 MG/DL (ref 74–106)
HCT VFR BLD AUTO: 29.7 % (ref 31.2–41.9)
HGB BLD-MCNC: 9.1 G/DL (ref 10.9–14.3)
LACTATE SERPL-SCNC: 2 MMOL/L (ref 0.4–2)
LYMPHOCYTES # BLD AUTO: 1.8 K/UL (ref 0.8–4.8)
LYMPHOCYTES NFR BLD AUTO: 15.7 % (ref 20.5–51.5)
MAGNESIUM SERPL-MCNC: 2.1 MG/DL (ref 1.8–2.4)
MANUAL DIF COMMENT BLD-IMP: 1
MCH RBC QN AUTO: 29.5 UUG (ref 24.7–32.8)
MCHC RBC AUTO-ENTMCNC: 31 G/DL (ref 32.3–35.6)
MCV RBC AUTO: 95.7 FL (ref 75.5–95.3)
MONOCYTES # BLD AUTO: 0.9 K/UL (ref 0.1–1.3)
MONOCYTES NFR BLD AUTO: 7.7 % (ref 0–11)
NEUTROPHILS # BLD AUTO: 8.3 K/UL (ref 1.8–8.9)
NEUTROPHILS NFR BLD AUTO: 73.4 % (ref 38.5–71.5)
PHOSPHATE SERPL-MCNC: 4 MG/DL (ref 2.5–4.9)
PLATELET # BLD AUTO: 323 K/UL (ref 179–408)
POTASSIUM SERPL-SCNC: 4.6 MMOL/L (ref 3.5–5.1)
PTH-INTACT SERPL-MCNC: 35 PG/ML (ref 15–65)
RBC # BLD AUTO: 3.1 MIL/UL (ref 3.63–4.92)
SODIUM SERPL-SCNC: 140 MMOL/L (ref 136–145)
WBC # BLD AUTO: 11.4 K/UL (ref 3.8–11.8)

## 2023-08-19 RX ADMIN — DOXYCYCLINE HYCLATE SCH MG: 100 TABLET, COATED ORAL at 21:08

## 2023-08-19 RX ADMIN — DOXEPIN HYDROCHLORIDE SCH MG: 25 CAPSULE ORAL at 21:08

## 2023-08-19 RX ADMIN — DOCUSATE SODIUM SCH MG: 100 CAPSULE, LIQUID FILLED ORAL at 21:08

## 2023-08-19 RX ADMIN — PANTOPRAZOLE SODIUM SCH MG: 40 TABLET, DELAYED RELEASE ORAL at 06:11

## 2023-08-19 RX ADMIN — SODIUM CHLORIDE PRN MG: 9 INJECTION, SOLUTION INTRAVENOUS at 14:28

## 2023-08-19 RX ADMIN — PIPERACILLIN SODIUM AND TAZOBACTAM SODIUM SCH MLS/HR: .375; 3 INJECTION, POWDER, LYOPHILIZED, FOR SOLUTION INTRAVENOUS at 05:15

## 2023-08-19 RX ADMIN — GABAPENTIN SCH MG: 300 CAPSULE ORAL at 17:22

## 2023-08-19 RX ADMIN — ARIPIPRAZOLE SCH MG: 5 TABLET ORAL at 17:22

## 2023-08-19 RX ADMIN — OXYCODONE HYDROCHLORIDE AND ACETAMINOPHEN SCH MG: 500 TABLET ORAL at 09:21

## 2023-08-19 RX ADMIN — LEVOTHYROXINE SODIUM SCH MCG: 25 TABLET ORAL at 06:11

## 2023-08-19 RX ADMIN — ZINC SULFATE CAP 220 MG (50 MG ELEMENTAL ZN) SCH MG: 220 (50 ZN) CAP at 09:21

## 2023-08-19 RX ADMIN — THERA TABS SCH UDTAB: TAB at 09:21

## 2023-08-19 RX ADMIN — ALLOPURINOL SCH MG: 100 TABLET ORAL at 09:21

## 2023-08-19 RX ADMIN — Medication SCH ML: at 14:00

## 2023-08-19 RX ADMIN — VENLAFAXINE HYDROCHLORIDE SCH MG: 150 CAPSULE, EXTENDED RELEASE ORAL at 09:21

## 2023-08-19 RX ADMIN — GABAPENTIN SCH MG: 300 CAPSULE ORAL at 12:39

## 2023-08-19 RX ADMIN — ATORVASTATIN CALCIUM SCH MG: 10 TABLET, FILM COATED ORAL at 21:08

## 2023-08-19 RX ADMIN — DILTIAZEM HYDROCHLORIDE SCH MG: 240 CAPSULE, EXTENDED RELEASE ORAL at 09:25

## 2023-08-19 RX ADMIN — APIXABAN SCH MG: 5 TABLET, FILM COATED ORAL at 12:39

## 2023-08-19 RX ADMIN — Medication SCH ML: at 17:23

## 2023-08-19 RX ADMIN — GABAPENTIN SCH MG: 300 CAPSULE ORAL at 09:21

## 2023-08-19 RX ADMIN — APIXABAN SCH MG: 5 TABLET, FILM COATED ORAL at 21:10

## 2023-08-19 RX ADMIN — ARIPIPRAZOLE SCH MG: 5 TABLET ORAL at 09:21

## 2023-08-20 VITALS — OXYGEN SATURATION: 90 % | DIASTOLIC BLOOD PRESSURE: 85 MMHG | TEMPERATURE: 98.3 F | SYSTOLIC BLOOD PRESSURE: 124 MMHG

## 2023-08-20 VITALS — DIASTOLIC BLOOD PRESSURE: 54 MMHG | TEMPERATURE: 98.2 F | SYSTOLIC BLOOD PRESSURE: 121 MMHG | OXYGEN SATURATION: 91 %

## 2023-08-20 VITALS — DIASTOLIC BLOOD PRESSURE: 32 MMHG | SYSTOLIC BLOOD PRESSURE: 121 MMHG | TEMPERATURE: 98.2 F | OXYGEN SATURATION: 94 %

## 2023-08-20 RX ADMIN — DOXEPIN HYDROCHLORIDE SCH MG: 25 CAPSULE ORAL at 20:24

## 2023-08-20 RX ADMIN — ARIPIPRAZOLE SCH MG: 5 TABLET ORAL at 08:54

## 2023-08-20 RX ADMIN — GABAPENTIN SCH MG: 300 CAPSULE ORAL at 17:02

## 2023-08-20 RX ADMIN — PANTOPRAZOLE SODIUM SCH MG: 40 TABLET, DELAYED RELEASE ORAL at 06:31

## 2023-08-20 RX ADMIN — Medication SCH ML: at 11:59

## 2023-08-20 RX ADMIN — DOCUSATE SODIUM SCH MG: 100 CAPSULE, LIQUID FILLED ORAL at 20:24

## 2023-08-20 RX ADMIN — ATORVASTATIN CALCIUM SCH MG: 10 TABLET, FILM COATED ORAL at 20:24

## 2023-08-20 RX ADMIN — OXYCODONE HYDROCHLORIDE AND ACETAMINOPHEN SCH MG: 500 TABLET ORAL at 08:54

## 2023-08-20 RX ADMIN — GABAPENTIN SCH MG: 300 CAPSULE ORAL at 13:18

## 2023-08-20 RX ADMIN — GABAPENTIN SCH MG: 300 CAPSULE ORAL at 08:54

## 2023-08-20 RX ADMIN — DOXYCYCLINE HYCLATE SCH MG: 100 TABLET, COATED ORAL at 08:54

## 2023-08-20 RX ADMIN — APIXABAN SCH MG: 5 TABLET, FILM COATED ORAL at 08:55

## 2023-08-20 RX ADMIN — DOXYCYCLINE HYCLATE SCH MG: 100 TABLET, COATED ORAL at 20:24

## 2023-08-20 RX ADMIN — ARIPIPRAZOLE SCH MG: 5 TABLET ORAL at 17:02

## 2023-08-20 RX ADMIN — VENLAFAXINE HYDROCHLORIDE SCH MG: 150 CAPSULE, EXTENDED RELEASE ORAL at 08:58

## 2023-08-20 RX ADMIN — ZINC SULFATE CAP 220 MG (50 MG ELEMENTAL ZN) SCH MG: 220 (50 ZN) CAP at 08:54

## 2023-08-20 RX ADMIN — Medication SCH ML: at 08:00

## 2023-08-20 RX ADMIN — THERA TABS SCH UDTAB: TAB at 08:55

## 2023-08-20 RX ADMIN — ALLOPURINOL SCH MG: 100 TABLET ORAL at 08:55

## 2023-08-20 RX ADMIN — LEVOTHYROXINE SODIUM SCH MCG: 25 TABLET ORAL at 06:31

## 2023-08-20 RX ADMIN — Medication SCH ML: at 17:03

## 2023-08-20 RX ADMIN — APIXABAN SCH MG: 5 TABLET, FILM COATED ORAL at 20:23

## 2023-08-20 RX ADMIN — CHLORDIAZEPOXIDE HYDROCHLORIDE PRN MG: 25 CAPSULE ORAL at 20:24

## 2023-08-20 RX ADMIN — DILTIAZEM HYDROCHLORIDE SCH MG: 240 CAPSULE, EXTENDED RELEASE ORAL at 09:02

## 2023-08-21 VITALS — OXYGEN SATURATION: 99 % | SYSTOLIC BLOOD PRESSURE: 118 MMHG | DIASTOLIC BLOOD PRESSURE: 64 MMHG | TEMPERATURE: 97.9 F

## 2023-08-21 VITALS — TEMPERATURE: 97.7 F | SYSTOLIC BLOOD PRESSURE: 126 MMHG | OXYGEN SATURATION: 96 % | DIASTOLIC BLOOD PRESSURE: 66 MMHG

## 2023-08-21 VITALS — SYSTOLIC BLOOD PRESSURE: 111 MMHG | DIASTOLIC BLOOD PRESSURE: 64 MMHG | TEMPERATURE: 98.7 F | OXYGEN SATURATION: 90 %

## 2023-08-21 LAB
ALBUMIN SERPL BCG-MCNC: 2.7 G/DL (ref 3.4–5)
ALP SERPL-CCNC: 70 U/L (ref 50–136)
ALT SERPL W/O P-5'-P-CCNC: 23 U/L (ref 14–59)
AST SERPL-CCNC: 8 U/L (ref 15–37)
BASOPHILS # BLD AUTO: 0 K/UL (ref 0–0.2)
BASOPHILS NFR BLD AUTO: 0.4 % (ref 0–2)
BILIRUB SERPL-MCNC: 0.4 MG/DL (ref 0.2–1)
BUN SERPL-MCNC: 20 MG/DL (ref 7–18)
CALCIUM SERPL-MCNC: 8.4 MG/DL (ref 8.5–10.1)
CHLORIDE SERPL-SCNC: 102 MMOL/L (ref 98–107)
CO2 SERPL-SCNC: 32 MMOL/L (ref 21–32)
CREAT SERPL-MCNC: 1.7 MG/DL (ref 0.6–1.3)
EOSINOPHIL # BLD AUTO: 0.2 K/UL (ref 0–0.7)
EOSINOPHIL NFR BLD AUTO: 2.6 % (ref 0–7)
ERYTHROCYTE [DISTWIDTH] IN BLOOD BY AUTOMATED COUNT: 19.7 % (ref 12.3–17.7)
GLUCOSE SERPL-MCNC: 114 MG/DL (ref 74–106)
HCT VFR BLD AUTO: 28.7 % (ref 31.2–41.9)
HGB BLD-MCNC: 9.1 G/DL (ref 10.9–14.3)
LYMPHOCYTES # BLD AUTO: 2.1 K/UL (ref 0.8–4.8)
LYMPHOCYTES NFR BLD AUTO: 26.6 % (ref 20.5–51.5)
MAGNESIUM SERPL-MCNC: 1.9 MG/DL (ref 1.8–2.4)
MANUAL DIF COMMENT BLD-IMP: 1
MCH RBC QN AUTO: 30.4 UUG (ref 24.7–32.8)
MCHC RBC AUTO-ENTMCNC: 32 G/DL (ref 32.3–35.6)
MCV RBC AUTO: 95.7 FL (ref 75.5–95.3)
MONOCYTES # BLD AUTO: 0.9 K/UL (ref 0.1–1.3)
MONOCYTES NFR BLD AUTO: 11.7 % (ref 0–11)
NEUTROPHILS # BLD AUTO: 4.6 K/UL (ref 1.8–8.9)
NEUTROPHILS NFR BLD AUTO: 58.7 % (ref 38.5–71.5)
PHOSPHATE SERPL-MCNC: 2.7 MG/DL (ref 2.5–4.9)
PLATELET # BLD AUTO: 307 K/UL (ref 179–408)
POTASSIUM SERPL-SCNC: 4.1 MMOL/L (ref 3.5–5.1)
RBC # BLD AUTO: 2.99 MIL/UL (ref 3.63–4.92)
SODIUM SERPL-SCNC: 139 MMOL/L (ref 136–145)
WBC # BLD AUTO: 7.9 K/UL (ref 3.8–11.8)
WS STN SPEC: 6.6 G/DL (ref 6.4–8.2)

## 2023-08-21 RX ADMIN — GABAPENTIN SCH MG: 300 CAPSULE ORAL at 12:19

## 2023-08-21 RX ADMIN — CHLORDIAZEPOXIDE HYDROCHLORIDE PRN MG: 25 CAPSULE ORAL at 21:01

## 2023-08-21 RX ADMIN — ALLOPURINOL SCH MG: 100 TABLET ORAL at 09:28

## 2023-08-21 RX ADMIN — Medication SCH ML: at 08:45

## 2023-08-21 RX ADMIN — ATORVASTATIN CALCIUM SCH MG: 10 TABLET, FILM COATED ORAL at 20:02

## 2023-08-21 RX ADMIN — GABAPENTIN SCH MG: 300 CAPSULE ORAL at 09:27

## 2023-08-21 RX ADMIN — ARIPIPRAZOLE SCH MG: 5 TABLET ORAL at 09:28

## 2023-08-21 RX ADMIN — APIXABAN SCH MG: 5 TABLET, FILM COATED ORAL at 09:45

## 2023-08-21 RX ADMIN — Medication SCH ML: at 17:00

## 2023-08-21 RX ADMIN — DOXEPIN HYDROCHLORIDE SCH MG: 25 CAPSULE ORAL at 20:16

## 2023-08-21 RX ADMIN — DOXYCYCLINE HYCLATE SCH MG: 100 TABLET, COATED ORAL at 09:27

## 2023-08-21 RX ADMIN — VENLAFAXINE HYDROCHLORIDE SCH MG: 150 CAPSULE, EXTENDED RELEASE ORAL at 09:39

## 2023-08-21 RX ADMIN — DILTIAZEM HYDROCHLORIDE SCH MG: 240 CAPSULE, EXTENDED RELEASE ORAL at 09:00

## 2023-08-21 RX ADMIN — OXYCODONE HYDROCHLORIDE AND ACETAMINOPHEN SCH MG: 500 TABLET ORAL at 09:27

## 2023-08-21 RX ADMIN — ZINC SULFATE CAP 220 MG (50 MG ELEMENTAL ZN) SCH MG: 220 (50 ZN) CAP at 09:27

## 2023-08-21 RX ADMIN — THERA TABS SCH UDTAB: TAB at 09:27

## 2023-08-21 RX ADMIN — DOCUSATE SODIUM SCH MG: 100 CAPSULE, LIQUID FILLED ORAL at 20:16

## 2023-08-21 RX ADMIN — LEVOTHYROXINE SODIUM SCH MCG: 25 TABLET ORAL at 06:29

## 2023-08-21 RX ADMIN — PANTOPRAZOLE SODIUM SCH MG: 40 TABLET, DELAYED RELEASE ORAL at 06:29

## 2023-08-21 RX ADMIN — Medication SCH ML: at 12:00

## 2023-08-21 RX ADMIN — APIXABAN SCH MG: 5 TABLET, FILM COATED ORAL at 20:03

## 2023-08-21 RX ADMIN — GABAPENTIN SCH MG: 300 CAPSULE ORAL at 16:59

## 2023-08-21 RX ADMIN — DOXYCYCLINE HYCLATE SCH MG: 100 TABLET, COATED ORAL at 20:16

## 2023-08-21 RX ADMIN — ARIPIPRAZOLE SCH MG: 5 TABLET ORAL at 16:59

## 2023-08-22 VITALS — OXYGEN SATURATION: 94 %

## 2023-08-22 VITALS — SYSTOLIC BLOOD PRESSURE: 124 MMHG | OXYGEN SATURATION: 94 % | TEMPERATURE: 97.6 F | DIASTOLIC BLOOD PRESSURE: 66 MMHG

## 2023-08-22 VITALS — OXYGEN SATURATION: 90 % | SYSTOLIC BLOOD PRESSURE: 123 MMHG | DIASTOLIC BLOOD PRESSURE: 71 MMHG | TEMPERATURE: 98.4 F

## 2023-08-22 LAB
ALBUMIN SERPL BCG-MCNC: 2.6 G/DL (ref 3.4–5)
ALBUMIN SERPL ELPH-MCNC: 2.5 G/DL (ref 2.9–4.4)
ALBUMIN/GLOB SERPL: 0.9 {RATIO} (ref 0.7–1.7)
ALP SERPL-CCNC: 77 U/L (ref 50–136)
ALPHA1 GLOB SERPL ELPH-MCNC: 0.3 G/DL (ref 0–0.4)
ALPHA2 GLOB SERPL ELPH-MCNC: 0.8 G/DL (ref 0.4–1)
ALT SERPL W/O P-5'-P-CCNC: 23 U/L (ref 14–59)
AST SERPL-CCNC: 25 U/L (ref 15–37)
B-GLOBULIN SERPL ELPH-MCNC: 1 G/DL (ref 0.7–1.3)
BASOPHILS # BLD AUTO: 0 K/UL (ref 0–0.2)
BASOPHILS NFR BLD AUTO: 0.5 % (ref 0–2)
BILIRUB SERPL-MCNC: 0.2 MG/DL (ref 0.2–1)
BUN SERPL-MCNC: 19 MG/DL (ref 7–18)
CALCIUM SERPL-MCNC: 8.4 MG/DL (ref 8.5–10.1)
CHLORIDE SERPL-SCNC: 105 MMOL/L (ref 98–107)
CO2 SERPL-SCNC: 25 MMOL/L (ref 21–32)
CREAT SERPL-MCNC: 1.6 MG/DL (ref 0.6–1.3)
EOSINOPHIL # BLD AUTO: 0.3 K/UL (ref 0–0.7)
EOSINOPHIL NFR BLD AUTO: 2.9 % (ref 0–7)
ERYTHROCYTE [DISTWIDTH] IN BLOOD BY AUTOMATED COUNT: 20.4 % (ref 12.3–17.7)
GAMMA GLOB SERPL ELPH-MCNC: 0.9 G/DL (ref 0.4–1.8)
GLOBULIN SER CALC-MCNC: 2.9 G/DL (ref 2.2–3.9)
GLUCOSE SERPL-MCNC: 127 MG/DL (ref 74–106)
HCT VFR BLD AUTO: 32.4 % (ref 31.2–41.9)
HGB BLD-MCNC: 9.4 G/DL (ref 10.9–14.3)
LYMPHOCYTES # BLD AUTO: 1.8 K/UL (ref 0.8–4.8)
LYMPHOCYTES NFR BLD AUTO: 20.6 % (ref 20.5–51.5)
MAGNESIUM SERPL-MCNC: 1.9 MG/DL (ref 1.8–2.4)
MANUAL DIF COMMENT BLD-IMP: 0
MCH RBC QN AUTO: 29.7 UUG (ref 24.7–32.8)
MCHC RBC AUTO-ENTMCNC: 29 G/DL (ref 32.3–35.6)
MCV RBC AUTO: 101.8 FL (ref 75.5–95.3)
MONOCYTES # BLD AUTO: 0.8 K/UL (ref 0.1–1.3)
MONOCYTES NFR BLD AUTO: 9.2 % (ref 0–11)
NEUTROPHILS # BLD AUTO: 5.8 K/UL (ref 1.8–8.9)
NEUTROPHILS NFR BLD AUTO: 66.8 % (ref 38.5–71.5)
PHOSPHATE SERPL-MCNC: 2.8 MG/DL (ref 2.5–4.9)
PLATELET # BLD AUTO: 271 K/UL (ref 179–408)
POTASSIUM SERPL-SCNC: 4.4 MMOL/L (ref 3.5–5.1)
RBC # BLD AUTO: 3.18 MIL/UL (ref 3.63–4.92)
SODIUM SERPL-SCNC: 139 MMOL/L (ref 136–145)
WBC # BLD AUTO: 8.7 K/UL (ref 3.8–11.8)
WS STN SPEC: 6.8 G/DL (ref 6.4–8.2)

## 2023-08-22 RX ADMIN — PANTOPRAZOLE SODIUM SCH MG: 40 TABLET, DELAYED RELEASE ORAL at 06:26

## 2023-08-22 RX ADMIN — Medication SCH ML: at 08:10

## 2023-08-22 RX ADMIN — ALLOPURINOL SCH MG: 100 TABLET ORAL at 08:08

## 2023-08-22 RX ADMIN — Medication SCH ML: at 12:00

## 2023-08-22 RX ADMIN — OXYCODONE HYDROCHLORIDE AND ACETAMINOPHEN SCH MG: 500 TABLET ORAL at 08:08

## 2023-08-22 RX ADMIN — LEVOTHYROXINE SODIUM SCH MCG: 25 TABLET ORAL at 06:26

## 2023-08-22 RX ADMIN — GABAPENTIN SCH MG: 300 CAPSULE ORAL at 13:09

## 2023-08-22 RX ADMIN — ARIPIPRAZOLE SCH MG: 5 TABLET ORAL at 08:08

## 2023-08-22 RX ADMIN — DILTIAZEM HYDROCHLORIDE SCH MG: 240 CAPSULE, EXTENDED RELEASE ORAL at 08:08

## 2023-08-22 RX ADMIN — VENLAFAXINE HYDROCHLORIDE SCH MG: 150 CAPSULE, EXTENDED RELEASE ORAL at 08:09

## 2023-08-22 RX ADMIN — GABAPENTIN SCH MG: 300 CAPSULE ORAL at 08:08

## 2023-08-22 RX ADMIN — APIXABAN SCH MG: 5 TABLET, FILM COATED ORAL at 08:09

## 2023-08-22 RX ADMIN — ZINC SULFATE CAP 220 MG (50 MG ELEMENTAL ZN) SCH MG: 220 (50 ZN) CAP at 08:08

## 2023-08-22 RX ADMIN — DOXYCYCLINE HYCLATE SCH MG: 100 TABLET, COATED ORAL at 08:08

## 2023-08-22 RX ADMIN — THERA TABS SCH UDTAB: TAB at 08:08

## 2023-08-22 RX ADMIN — SODIUM CHLORIDE PRN MG: 9 INJECTION, SOLUTION INTRAVENOUS at 10:14

## 2025-05-30 NOTE — NUR
AFTER YOUR COLONOSCOPY    Date of Procedure:  5/30/2025    You had the following procedure(s) performed today:  Colonoscopy    Exam Results:  The physician removed 1 polyp(s), which will be sent to the lab for testing. Results can take up to 14 business days to be communicated to you.  Your physician will contact you with the result of your biopsy and when your follow-up colonoscopy is due and The physician found diverticulosis.  See the attachment for more information.    Diet Instructions:  You may resume your regular diet today. It is recommended to avoid heavy, greasy, and spicy foods today. and Your doctor also advises that you increase your fiber intake (fruits, vegetables, whole grains, etc.) If necessary, you may also add a fiber supplement, such as Metamucil or Citrucel.    Medications:  You may resume your medications as prescribed. and Do not use aspirin or ibuprofen (Advil, Motrin, etc.) or other NSAIDS (anti-inflammatory drugs like Aleve or Naprosyn) for 3 days.  You may use Tylenol (acetaminophen) for relief is necessary.    Activity for Today:    DO NOT DRIVE.  Do not operate any other heavy machinery or equipment.  Avoid activities that require coordination (climbing ladders, using a knife/cooking equipment, etc.)  Do not make important financial or legal decisions  Do not return to work.  Do not drink any alcohol.  Rest the remainder of the day. You may resume your activity tomorrow.    It is normal to have.....    Colonoscopy / Sigmoidoscopy   Mild abdominal distention and/or cramping are normal after these procedures, but should pass within an hour or two with the passage of air.  A scant amount of rectal bleeding is normal following a biopsy, polypectomy or if you have hemorrhoids.   Mild nausea and/or vomiting     When to call Dr. Fry at [phone]    For Colonoscopy / Sigmoidoscopy   If you have unusual belly pain that is NOT relieved with passing air  If you have rectal bleeding more  RN CLOSING NOTES



PATIENT ASLEEP, RESTING COMFORTABLY IN BED, EASILY AWAKENED. NO SIGNS OF RESPIRATORY 
DISTRESS. NO SHORTNESS OF BREATH NOTED. PATIENT DENIES ANY PAIN. IV SITE INTACT, NO SIGNS OF 
INFECTION/INFILTRATION. TELE MONITOR. PATIENT REFUSED AM INSULIN ADMINISTRATION, EXPLAINED 
THE RISKS AND BENEFITS, PATIENT INSISTS ON REFUSING. ALL NEEDS ATTENDED. SAFETY PRECAUTIONS 
IMPLEMENTED; CALL LIGHT WITHIN REACH, BED LOW, BED LOCKED, BILATERAL UPPER SIDE RAILS UP. NO 
SIGNIFICANT CHANGES SINCE ADMISSION. WILL ENDORSE TO DAY SHIFT NURSE FOR CONTINUITY OF CARE. than blood streaking on the toilet tissue  If you have moderate nausea and/or vomiting         Go to the Emergency Room if you experience any of the following:  Severe pain  Difficulty breathing or swallowing  Persistent vomiting  Vomiting blood, either brown (coffee ground in consistency) or red  Significant bright red, rectal bleeding  Black colored or bloody stool  Chills or fever over 101 degrees F.     Additional Instructions:  None